# Patient Record
Sex: MALE | Race: BLACK OR AFRICAN AMERICAN | NOT HISPANIC OR LATINO | Employment: OTHER | URBAN - METROPOLITAN AREA
[De-identification: names, ages, dates, MRNs, and addresses within clinical notes are randomized per-mention and may not be internally consistent; named-entity substitution may affect disease eponyms.]

---

## 2017-05-30 ENCOUNTER — OFFICE VISIT (OUTPATIENT)
Dept: FAMILY MEDICINE | Facility: CLINIC | Age: 67
End: 2017-05-30
Payer: MEDICARE

## 2017-05-30 ENCOUNTER — HOSPITAL ENCOUNTER (OUTPATIENT)
Dept: RADIOLOGY | Facility: HOSPITAL | Age: 67
Discharge: HOME | End: 2017-05-30
Attending: INTERNAL MEDICINE
Payer: MEDICARE

## 2017-05-30 ENCOUNTER — TELEPHONE (OUTPATIENT)
Dept: FAMILY MEDICINE | Facility: CLINIC | Age: 67
End: 2017-05-30

## 2017-05-30 VITALS
HEART RATE: 79 BPM | DIASTOLIC BLOOD PRESSURE: 90 MMHG | HEIGHT: 69 IN | OXYGEN SATURATION: 97 % | WEIGHT: 201.25 LBS | SYSTOLIC BLOOD PRESSURE: 154 MMHG | RESPIRATION RATE: 18 BRPM | TEMPERATURE: 98 F | BODY MASS INDEX: 29.81 KG/M2

## 2017-05-30 DIAGNOSIS — G89.29 CHRONIC LOW BACK PAIN WITHOUT SCIATICA, UNSPECIFIED BACK PAIN LATERALITY: ICD-10-CM

## 2017-05-30 DIAGNOSIS — Z12.5 ENCOUNTER FOR PROSTATE CANCER SCREENING: ICD-10-CM

## 2017-05-30 DIAGNOSIS — R29.898 WEAKNESS OF BOTH LOWER EXTREMITIES: ICD-10-CM

## 2017-05-30 DIAGNOSIS — E55.9 VITAMIN D DEFICIENCY: ICD-10-CM

## 2017-05-30 DIAGNOSIS — N40.0 BENIGN NON-NODULAR PROSTATIC HYPERPLASIA WITHOUT LOWER URINARY TRACT SYMPTOMS: ICD-10-CM

## 2017-05-30 DIAGNOSIS — E78.5 HYPERLIPIDEMIA, UNSPECIFIED HYPERLIPIDEMIA TYPE: ICD-10-CM

## 2017-05-30 DIAGNOSIS — H91.93 BILATERAL HEARING LOSS, UNSPECIFIED HEARING LOSS TYPE: ICD-10-CM

## 2017-05-30 DIAGNOSIS — M54.2 NECK PAIN: ICD-10-CM

## 2017-05-30 DIAGNOSIS — I10 ESSENTIAL HYPERTENSION: ICD-10-CM

## 2017-05-30 DIAGNOSIS — I10 ESSENTIAL HYPERTENSION: Primary | ICD-10-CM

## 2017-05-30 DIAGNOSIS — J18.9 PNEUMONIA DUE TO INFECTIOUS ORGANISM, UNSPECIFIED LATERALITY, UNSPECIFIED PART OF LUNG: Primary | ICD-10-CM

## 2017-05-30 DIAGNOSIS — M54.50 CHRONIC LOW BACK PAIN WITHOUT SCIATICA, UNSPECIFIED BACK PAIN LATERALITY: ICD-10-CM

## 2017-05-30 PROCEDURE — 71020 XR CHEST PA AND LATERAL: CPT | Mod: TC,PO

## 2017-05-30 PROCEDURE — 71020 XR CHEST PA AND LATERAL: CPT | Mod: 26,,, | Performed by: RADIOLOGY

## 2017-05-30 PROCEDURE — 99215 OFFICE O/P EST HI 40 MIN: CPT | Mod: S$GLB,,, | Performed by: INTERNAL MEDICINE

## 2017-05-30 PROCEDURE — 99499 UNLISTED E&M SERVICE: CPT | Mod: S$GLB,,, | Performed by: INTERNAL MEDICINE

## 2017-05-30 PROCEDURE — 99999 PR PBB SHADOW E&M-EST. PATIENT-LVL V: CPT | Mod: PBBFAC,,, | Performed by: INTERNAL MEDICINE

## 2017-05-30 RX ORDER — TAMSULOSIN HYDROCHLORIDE 0.4 MG/1
1 CAPSULE ORAL DAILY
Qty: 30 CAPSULE | Refills: 5 | Status: SHIPPED | OUTPATIENT
Start: 2017-05-30 | End: 2018-01-11 | Stop reason: SDUPTHER

## 2017-05-30 RX ORDER — AMLODIPINE AND BENAZEPRIL HYDROCHLORIDE 5; 10 MG/1; MG/1
1 CAPSULE ORAL DAILY
Qty: 30 CAPSULE | Refills: 3 | Status: SHIPPED | OUTPATIENT
Start: 2017-05-30 | End: 2017-10-05 | Stop reason: SDUPTHER

## 2017-05-30 RX ORDER — LEVOFLOXACIN 750 MG/1
750 TABLET ORAL DAILY
Qty: 10 TABLET | Refills: 0 | Status: SHIPPED | OUTPATIENT
Start: 2017-05-30 | End: 2017-05-31

## 2017-05-30 NOTE — PROGRESS NOTES
Subjective:       Patient ID: Jesse Chow Sr. is a 66 y.o. male.    Chief Complaint: Extremity Weakness; Neck Pain; Hypertension; Hyperlipidemia; and Benign Prostatic Hypertrophy  -3-4 months of leg weakness---bilat--feel heavy with walking---------and neck pain and head feels heavy with movementExtremity Weakness    This is a recurrent problem. Pertinent negatives include no fever or numbness.   Neck Pain    This is a recurrent problem. Associated symptoms include weakness. Pertinent negatives include no chest pain, fever, headaches, numbness, photophobia or trouble swallowing.   Hypertension   Associated symptoms include neck pain. Pertinent negatives include no chest pain, headaches, palpitations or shortness of breath. Compliance problems: --------has not taken his lotrel for about 1 year----------------------------------    Hyperlipidemia   Pertinent negatives include no chest pain, myalgias or shortness of breath. Current antihyperlipidemic treatment includes diet change and exercise.   Benign Prostatic Hypertrophy   Irritative symptoms do not include frequency or urgency. Pertinent negatives include no chills, dysuria, hematuria, nausea or vomiting. Past treatments include tamsulosin.     Review of Systems   Constitutional: Positive for activity change and fatigue. Negative for appetite change, chills, diaphoresis, fever and unexpected weight change.   HENT: Negative for drooling, ear discharge, ear pain, facial swelling, hearing loss, mouth sores, nosebleeds, postnasal drip, rhinorrhea, sinus pressure, sneezing, sore throat, tinnitus, trouble swallowing and voice change.    Eyes: Negative for photophobia, redness and visual disturbance.   Respiratory: Negative for apnea, cough, choking, chest tightness, shortness of breath and wheezing.    Cardiovascular: Negative for chest pain, palpitations and leg swelling.   Gastrointestinal: Negative for abdominal distention, abdominal pain, anal bleeding, blood in  stool, constipation, diarrhea, nausea and vomiting.   Endocrine: Negative for cold intolerance, heat intolerance, polydipsia, polyphagia and polyuria.   Genitourinary: Negative for difficulty urinating, dysuria, enuresis, flank pain, frequency, genital sores, hematuria and urgency.   Musculoskeletal: Positive for extremity weakness and neck pain. Negative for arthralgias, back pain, joint swelling, myalgias and neck stiffness.   Skin: Negative for color change, pallor, rash and wound.   Allergic/Immunologic: Negative for food allergies and immunocompromised state.   Neurological: Positive for weakness. Negative for dizziness, tremors, seizures, syncope, facial asymmetry, speech difficulty, light-headedness, numbness and headaches.   Hematological: Negative for adenopathy. Does not bruise/bleed easily.   Psychiatric/Behavioral: Negative for agitation, behavioral problems, confusion, decreased concentration, dysphoric mood, hallucinations, self-injury, sleep disturbance and suicidal ideas. The patient is not nervous/anxious and is not hyperactive.        Objective:      Physical Exam   Constitutional: He is oriented to person, place, and time. He appears well-developed and well-nourished. No distress.   HENT:   Head: Normocephalic and atraumatic.   Eyes: Pupils are equal, round, and reactive to light.   Neck: Normal range of motion. Neck supple. No JVD present. Carotid bruit is not present. No tracheal deviation present. No thyromegaly present.   Cardiovascular: Normal rate, regular rhythm, normal heart sounds and intact distal pulses.    Pulmonary/Chest: Effort normal and breath sounds normal. No respiratory distress. He has no wheezes. He has no rales. He exhibits no tenderness.   Abdominal: Soft. Bowel sounds are normal. He exhibits no distension. There is no tenderness. There is no rebound and no guarding.   Musculoskeletal: Normal range of motion. He exhibits no edema or tenderness.   Lymphadenopathy:     He has  no cervical adenopathy.   Neurological: He is alert and oriented to person, place, and time.   Skin: Skin is warm and dry. No rash noted. He is not diaphoretic. No erythema. No pallor.   Psychiatric: He has a normal mood and affect. His behavior is normal. Judgment and thought content normal.       Assessment:       1. Essential hypertension    2. Hyperlipidemia, unspecified hyperlipidemia type    3. Chronic low back pain without sciatica, unspecified back pain laterality    4. Benign non-nodular prostatic hyperplasia without lower urinary tract symptoms    5. Bilateral hearing loss, unspecified hearing loss type    6. Neck pain    7. Weakness of both lower extremities    8. Encounter for prostate cancer screening    9. Vitamin D deficiency        Plan:         resume lotrel 5/10 mg a day for bp, flomax q day for bph,    Watch diet,exercise.              Notes/labs reviewed.             Check cbc,cmp,esr,lipids,vitd,tsh,psa,cxr.           --------neuro consult for leg weakness-----------         F/u 2 weeks.-colon.

## 2017-05-30 NOTE — TELEPHONE ENCOUNTER
cxr shows ? Pneumonia------take levaquin 750 mg a day x 10 days and do a f/u cxr in 2 weeks-to ensure clearing----------

## 2017-05-31 ENCOUNTER — OFFICE VISIT (OUTPATIENT)
Dept: NEUROLOGY | Facility: CLINIC | Age: 67
End: 2017-05-31
Payer: MEDICARE

## 2017-05-31 VITALS
WEIGHT: 202.38 LBS | BODY MASS INDEX: 29.98 KG/M2 | HEIGHT: 69 IN | DIASTOLIC BLOOD PRESSURE: 80 MMHG | HEART RATE: 72 BPM | SYSTOLIC BLOOD PRESSURE: 144 MMHG

## 2017-05-31 DIAGNOSIS — R29.818 NEUROGENIC CLAUDICATION: ICD-10-CM

## 2017-05-31 DIAGNOSIS — R29.898 WEAKNESS OF BOTH LOWER EXTREMITIES: Primary | ICD-10-CM

## 2017-05-31 PROCEDURE — 99205 OFFICE O/P NEW HI 60 MIN: CPT | Mod: S$GLB,,, | Performed by: PSYCHIATRY & NEUROLOGY

## 2017-05-31 PROCEDURE — 99999 PR PBB SHADOW E&M-EST. PATIENT-LVL III: CPT | Mod: PBBFAC,,, | Performed by: PSYCHIATRY & NEUROLOGY

## 2017-05-31 NOTE — LETTER
May 31, 2017      Sunday Zhu MD  [address] [place] [address]           [facility]  [address] [place] [address]  Phone: [phone]          Patient: Jesse Chow Sr.   MR Number: [id number]   YOB: 1950   Date of Visit: 5/31/2017       Dear Dr. Sunday Zhu:    Thank you for referring Jesse Chow to me for evaluation. Attached you will find relevant portions of my assessment and plan of care.    If you have questions, please do not hesitate to call me. I look forward to following Jesse Chow along with you.    Sincerely,    Rodo Ramirez MD    Enclosure  CC:  No Recipients    If you would like to receive this communication electronically, please contact [email or web address] or [phone] to request more information on [facility] [employer] access.    For providers and/or their staff who would like to refer a patient to [facility], please contact us through our one-stop-shop provider referral line, [facility], at [phone].    If you feel you have received this communication in error or would no longer like to receive these types of communications, please e-mail [email or web address]

## 2017-05-31 NOTE — PROGRESS NOTES
Consult Requested By: No att. providers found  Reason for Consult:   Leg weakness     SUBJECTIVE:       HPI:   This is a 66-year-old gentleman, presented today in the clinic for evaluation of   leg pains.  He is right-handed.  He said that he had a back injury in 1983 or   1984 from falling from the stairs.  He was told that he had some injury in the   back, but did not have any operations.  For the last two to three months, he has   noticed that his legs are heavy.  On walking, it gets heavier.  He did not   fall, but he could not walk longer.  No numbness, tingling except at night.  In   the morning, sometimes he feels that his left hand is numb.  He does not do any   drugs.  Occasional alcohol use, but he smokes.  He said that he might have   stroke six years ago, which affected his memory.  He bowel function is the same,   no change.  Bladder incontinence, frequency and he has been diagnosed with   prostatic hypertrophy.    Past Medical History:   Diagnosis Date    Borderline high blood pressure     Chronic low back pain     H/O: Bell's palsy     H/O: Bell's palsy     Hyperlipidemia     Hypertension     Major depression     Sciatica     Tobacco abuse     Trouble in sleeping      Past Surgical History:   Procedure Laterality Date    HEMORRHOID SURGERY       Family History   Problem Relation Age of Onset    Hypertension Mother     Heart disease Mother     Stroke Mother     Diabetes Sister     Diabetes Brother     Diabetes Brother     Heart disease Brother      Social History   Substance Use Topics    Smoking status: Current Every Day Smoker     Packs/day: 1.00     Years: 42.00     Types: Cigarettes    Smokeless tobacco: Not on file      Comment: half a pack in 3 days      Alcohol use 0.6 oz/week     1 Glasses of wine per week      Comment: 1 glass per week     Review of patient's allergies indicates:   Allergen Reactions    Prostate [animal organ concentrates]        Review of Systems    Constitutional: Negative for fever and weight loss.   HENT: Positive for hearing loss. Negative for ear pain and tinnitus.    Eyes: Negative for blurred vision, double vision, photophobia, pain, discharge and redness.   Respiratory: Negative for cough and shortness of breath.    Cardiovascular: Negative for chest pain, palpitations, claudication and leg swelling.   Gastrointestinal: Negative for abdominal pain, heartburn, nausea and vomiting.   Genitourinary: Negative for dysuria, flank pain, frequency and urgency.   Musculoskeletal: Negative for back pain, falls, joint pain, myalgias and neck pain.   Skin: Negative for itching and rash.   Neurological: Positive for tingling and focal weakness. Negative for dizziness, tremors, sensory change, speech change, seizures, loss of consciousness, weakness and headaches.   Endo/Heme/Allergies: Does not bruise/bleed easily.   Psychiatric/Behavioral: Negative for depression, hallucinations, memory loss and suicidal ideas. The patient is not nervous/anxious and does not have insomnia.        OBJECTIVE:     Vital Signs (Most Recent)  Pulse: 72 (05/31/17 1409)  BP: (!) 144/80 (05/31/17 1409)    Physical Exam   Constitutional: He is oriented to person, place, and time. He appears well-developed and well-nourished. No distress.   HENT:   Head: Normocephalic.   Right Ear: External ear normal.   Left Ear: External ear normal.   Mouth/Throat: Oropharynx is clear and moist.   Eyes: Conjunctivae and EOM are normal. Pupils are equal, round, and reactive to light.   Neck: Normal range of motion. Neck supple. No tracheal deviation present. No thyromegaly present.   Cardiovascular: Regular rhythm, normal heart sounds and intact distal pulses.    Pulmonary/Chest: Effort normal and breath sounds normal. No respiratory distress.   Abdominal: Soft. Bowel sounds are normal. There is no tenderness.   Musculoskeletal: He exhibits no edema or tenderness.   Lymphadenopathy:     He has no cervical  adenopathy.   Neurological: He is alert and oriented to person, place, and time. He displays no tremor and normal reflexes. A sensory deficit is present. No cranial nerve deficit. He exhibits normal muscle tone. Coordination and gait abnormal. He displays no Babinski's sign on the right side. He displays no Babinski's sign on the left side.   Reflex Scores:       Tricep reflexes are 3+ on the right side and 3+ on the left side.       Bicep reflexes are 3+ on the right side and 3+ on the left side.       Brachioradialis reflexes are 3+ on the right side and 3+ on the left side.       Patellar reflexes are 3+ on the right side and 3+ on the left side.       Achilles reflexes are 4+ on the right side and 4+ on the left side.  Decreased sensation in the right leg compared to the left.    Skin: Skin is warm and dry. No rash noted. He is not diaphoretic. No erythema. No pallor.   Psychiatric: He has a normal mood and affect. His behavior is normal. Judgment and thought content normal.         Strength  Deltoids Triceps Biceps Wrist Extension Wrist Flexion Hand    Upper: R 5/5 5/5 5/5 5/5 5/5 5/5    L 5/5 5/5 5/5 5/5 5/5 5/5     Iliopsoas Quadriceps Knee  Flexion Tibialis  anterior Gastro- cnemius EHL   Lower: R 4/5 5/5 5/5 5/5 5/5 5/5    L 4/5 5/5 5/5 5/5 5/5 5/5     Laboratory:  Lab Results   Component Value Date    WBC 9.96 05/30/2017    HGB 13.2 (L) 05/30/2017    HCT 40.9 05/30/2017     05/30/2017    CHOL 196 05/30/2017    TRIG 83 05/30/2017    HDL 35 (L) 05/30/2017    ALT 14 05/30/2017    AST 17 05/30/2017     05/30/2017    K 4.6 05/30/2017     05/30/2017    CREATININE 0.8 05/30/2017    BUN 8 05/30/2017    CO2 27 05/30/2017    TSH 1.347 05/30/2017    PSA 2.2 05/30/2017             ASSESSMENT/PLAN:     Primary Diagnoses:  1. Lower extremity weakness over 2-3 months rule out lumbar radiculopathy or neurogenic claudication and cervical or lumbar levels.     Patient Active Problem List   Diagnosis     Hyperlipidemia    Chronic low back pain    Sciatica    Major depression    Tobacco abuse    Essential hypertension    Decreased vision    BPH (benign prostatic hyperplasia)    Hearing impaired    Vitamin D deficiency    Neck pain    Weakness of both lower extremities        Plan:  MRI of the l-spine and c-spine.  Time spent: 50 minutes in face to face discussion concerning diagnosis, prognosis, review of lab and test results, benefits of treatment as well as management of disease, counseling of patient and coordination of care between various health care providers . Greater than half the time spent was used for coordination of care and counseling of patient.

## 2017-06-02 RX ORDER — LEVOFLOXACIN 750 MG/1
750 TABLET ORAL DAILY
Qty: 10 TABLET | Refills: 0 | Status: SHIPPED | OUTPATIENT
Start: 2017-06-02 | End: 2017-06-13

## 2017-06-02 RX ORDER — LEVOFLOXACIN 750 MG/1
750 TABLET ORAL DAILY
Qty: 10 TABLET | Refills: 0 | Status: SHIPPED | OUTPATIENT
Start: 2017-06-02 | End: 2017-06-02 | Stop reason: SDUPTHER

## 2017-06-02 NOTE — TELEPHONE ENCOUNTER
----- Message from Francisco Javier Wooten sent at 6/2/2017 11:20 AM CDT -----  Contact: Elsie Chow (Spouse)  Elsie Chow (Spouse) is requesting a call from nurse to f/u on pt medication for his pneumonia. Elsie Chow (Spouse) states she went to the pharmacy and the medication wasn't there.        Please call Elsie Chow (Spouse) back at [phone]

## 2017-06-02 NOTE — TELEPHONE ENCOUNTER
Have attempted to contact pt with every number available, multiple attempts, with no success. [phone], someone answered and hung up and never answered again.

## 2017-06-02 NOTE — TELEPHONE ENCOUNTER
----- Message from Ana Sanchez sent at 6/2/2017 10:34 AM CDT -----  Contact: pt  States he's returning a call. Please call pt at [phone]. Thank you

## 2017-06-05 ENCOUNTER — TELEPHONE (OUTPATIENT)
Dept: RADIOLOGY | Facility: HOSPITAL | Age: 67
End: 2017-06-05

## 2017-06-05 NOTE — PROGRESS NOTES
Subjective:       Patient ID: Jesse Chow Sr. is a 66 y.o. male.    Chief Complaint: No chief complaint on file.    HPI  Review of Systems    Objective:      Physical Exam    Assessment:       No diagnosis found.    Plan:

## 2017-06-06 ENCOUNTER — HOSPITAL ENCOUNTER (OUTPATIENT)
Dept: RADIOLOGY | Facility: HOSPITAL | Age: 67
Discharge: HOME | End: 2017-06-06
Attending: PSYCHIATRY & NEUROLOGY
Payer: MEDICARE

## 2017-06-06 DIAGNOSIS — R29.818 NEUROGENIC CLAUDICATION: ICD-10-CM

## 2017-06-06 DIAGNOSIS — R29.898 WEAKNESS OF BOTH LOWER EXTREMITIES: ICD-10-CM

## 2017-06-06 PROCEDURE — 72141 MRI NECK SPINE W/O DYE: CPT | Mod: TC,PO

## 2017-06-06 PROCEDURE — 72141 MRI NECK SPINE W/O DYE: CPT | Mod: 26,,, | Performed by: RADIOLOGY

## 2017-06-06 PROCEDURE — 72148 MRI LUMBAR SPINE W/O DYE: CPT | Mod: TC,PO

## 2017-06-06 PROCEDURE — 72148 MRI LUMBAR SPINE W/O DYE: CPT | Mod: 26,,, | Performed by: RADIOLOGY

## 2017-06-08 ENCOUNTER — TELEPHONE (OUTPATIENT)
Dept: NEUROLOGY | Facility: CLINIC | Age: 67
End: 2017-06-08

## 2017-06-08 NOTE — TELEPHONE ENCOUNTER
----- Message from Rodo Ramirez MD sent at 6/7/2017  7:51 AM CDT -----  Your test result is abnormal but not serious. Will discuss on your return visit.

## 2017-06-09 ENCOUNTER — TELEPHONE (OUTPATIENT)
Dept: NEUROLOGY | Facility: CLINIC | Age: 67
End: 2017-06-09

## 2017-06-13 ENCOUNTER — TELEPHONE (OUTPATIENT)
Dept: FAMILY MEDICINE | Facility: CLINIC | Age: 67
End: 2017-06-13

## 2017-06-13 ENCOUNTER — OFFICE VISIT (OUTPATIENT)
Dept: FAMILY MEDICINE | Facility: CLINIC | Age: 67
End: 2017-06-13
Payer: MEDICARE

## 2017-06-13 ENCOUNTER — HOSPITAL ENCOUNTER (OUTPATIENT)
Dept: RADIOLOGY | Facility: HOSPITAL | Age: 67
Discharge: HOME | End: 2017-06-13
Attending: INTERNAL MEDICINE
Payer: MEDICARE

## 2017-06-13 VITALS
RESPIRATION RATE: 18 BRPM | BODY MASS INDEX: 29.59 KG/M2 | HEART RATE: 82 BPM | SYSTOLIC BLOOD PRESSURE: 120 MMHG | OXYGEN SATURATION: 99 % | HEIGHT: 69 IN | DIASTOLIC BLOOD PRESSURE: 70 MMHG | WEIGHT: 199.75 LBS | TEMPERATURE: 97 F

## 2017-06-13 DIAGNOSIS — M51.36 DDD (DEGENERATIVE DISC DISEASE), LUMBAR: ICD-10-CM

## 2017-06-13 DIAGNOSIS — R93.89 ABNORMAL CXR: ICD-10-CM

## 2017-06-13 DIAGNOSIS — M50.30 DDD (DEGENERATIVE DISC DISEASE), CERVICAL: ICD-10-CM

## 2017-06-13 DIAGNOSIS — G89.29 CHRONIC LOW BACK PAIN WITHOUT SCIATICA, UNSPECIFIED BACK PAIN LATERALITY: ICD-10-CM

## 2017-06-13 DIAGNOSIS — E78.5 HYPERLIPIDEMIA, UNSPECIFIED HYPERLIPIDEMIA TYPE: ICD-10-CM

## 2017-06-13 DIAGNOSIS — E53.8 B12 DEFICIENCY: ICD-10-CM

## 2017-06-13 DIAGNOSIS — M79.605 PAIN IN BOTH LOWER EXTREMITIES: ICD-10-CM

## 2017-06-13 DIAGNOSIS — M54.50 CHRONIC LOW BACK PAIN WITHOUT SCIATICA, UNSPECIFIED BACK PAIN LATERALITY: ICD-10-CM

## 2017-06-13 DIAGNOSIS — E55.9 VITAMIN D DEFICIENCY: ICD-10-CM

## 2017-06-13 DIAGNOSIS — I10 ESSENTIAL HYPERTENSION: ICD-10-CM

## 2017-06-13 DIAGNOSIS — N40.0 BENIGN NON-NODULAR PROSTATIC HYPERPLASIA WITHOUT LOWER URINARY TRACT SYMPTOMS: ICD-10-CM

## 2017-06-13 DIAGNOSIS — R29.818 NEUROGENIC CLAUDICATION: ICD-10-CM

## 2017-06-13 DIAGNOSIS — R29.898 WEAKNESS OF BOTH LOWER EXTREMITIES: Primary | ICD-10-CM

## 2017-06-13 DIAGNOSIS — M79.604 PAIN IN BOTH LOWER EXTREMITIES: ICD-10-CM

## 2017-06-13 DIAGNOSIS — J18.9 PNEUMONIA DUE TO INFECTIOUS ORGANISM, UNSPECIFIED LATERALITY, UNSPECIFIED PART OF LUNG: ICD-10-CM

## 2017-06-13 DIAGNOSIS — R91.1 LUNG NODULE: Primary | ICD-10-CM

## 2017-06-13 PROCEDURE — 99499 UNLISTED E&M SERVICE: CPT | Mod: S$GLB,,, | Performed by: INTERNAL MEDICINE

## 2017-06-13 PROCEDURE — 71020 XR CHEST PA AND LATERAL: CPT | Mod: 26,,, | Performed by: RADIOLOGY

## 2017-06-13 PROCEDURE — 99215 OFFICE O/P EST HI 40 MIN: CPT | Mod: S$GLB,,, | Performed by: INTERNAL MEDICINE

## 2017-06-13 PROCEDURE — 99999 PR PBB SHADOW E&M-EST. PATIENT-LVL V: CPT | Mod: PBBFAC,,, | Performed by: INTERNAL MEDICINE

## 2017-06-13 PROCEDURE — 71020 XR CHEST PA AND LATERAL: CPT | Mod: TC,PO

## 2017-06-13 NOTE — PROGRESS NOTES
Subjective:       Patient ID: Jesse Chow Sr. is a 66 y.o. male.    Chief Complaint: Follow-up; Results; Hypertension; Hyperlipidemia; Benign Prostatic Hypertrophy; Leg Pain; and Back Pain    Hypertension   Pertinent negatives include no chest pain, headaches, neck pain, palpitations or shortness of breath. Past treatments include calcium channel blockers and ACE inhibitors. The current treatment provides significant improvement.   Hyperlipidemia   Associated symptoms include leg pain. Pertinent negatives include no chest pain, myalgias or shortness of breath.   Benign Prostatic Hypertrophy   Irritative symptoms do not include frequency or urgency. Pertinent negatives include no chills, dysuria, hematuria, nausea or vomiting. Past treatments include tamsulosin.   Leg Pain    Pertinent negatives include no numbness.   Back Pain   This is a chronic problem. Associated symptoms include leg pain. Pertinent negatives include no abdominal pain, chest pain, dysuria, fever, headaches, numbness or weakness.     Review of Systems   Constitutional: Negative for activity change, appetite change, chills, diaphoresis, fatigue, fever and unexpected weight change.   HENT: Negative for drooling, ear discharge, ear pain, facial swelling, hearing loss, mouth sores, nosebleeds, postnasal drip, rhinorrhea, sinus pressure, sneezing, sore throat, tinnitus, trouble swallowing and voice change.    Eyes: Negative for photophobia, redness and visual disturbance.   Respiratory: Positive for cough. Negative for apnea, choking, chest tightness, shortness of breath and wheezing.    Cardiovascular: Negative for chest pain, palpitations and leg swelling.   Gastrointestinal: Negative for abdominal distention, abdominal pain, anal bleeding, blood in stool, constipation, diarrhea, nausea, rectal pain and vomiting.   Endocrine: Negative for cold intolerance, heat intolerance, polydipsia, polyphagia and polyuria.   Genitourinary: Negative for  difficulty urinating, dysuria, enuresis, flank pain, frequency, genital sores, hematuria and urgency.   Musculoskeletal: Positive for back pain and gait problem. Negative for arthralgias, joint swelling, myalgias, neck pain and neck stiffness.   Skin: Negative for color change, pallor, rash and wound.   Allergic/Immunologic: Negative for food allergies and immunocompromised state.   Neurological: Negative for dizziness, tremors, seizures, syncope, facial asymmetry, speech difficulty, weakness, light-headedness, numbness and headaches.   Hematological: Negative for adenopathy. Does not bruise/bleed easily.   Psychiatric/Behavioral: Negative for agitation, behavioral problems, confusion, decreased concentration, dysphoric mood, hallucinations, self-injury, sleep disturbance and suicidal ideas. The patient is not nervous/anxious and is not hyperactive.        Objective:      Physical Exam   Constitutional: He is oriented to person, place, and time. He appears well-developed and well-nourished. No distress.   HENT:   Head: Normocephalic and atraumatic.   Eyes: Pupils are equal, round, and reactive to light.   Neck: Normal range of motion. Neck supple. No JVD present. Carotid bruit is not present. No tracheal deviation present. No thyromegaly present.   Cardiovascular: Normal rate, regular rhythm, normal heart sounds and intact distal pulses.    Pulmonary/Chest: Effort normal and breath sounds normal. No respiratory distress. He has no wheezes. He has no rales. He exhibits no tenderness.   Abdominal: Soft. Bowel sounds are normal. He exhibits no distension. There is no tenderness. There is no rebound and no guarding.   Musculoskeletal: Normal range of motion. He exhibits no edema or tenderness.   Lymphadenopathy:     He has no cervical adenopathy.   Neurological: He is alert and oriented to person, place, and time.   Skin: Skin is warm and dry. No rash noted. He is not diaphoretic. No erythema. No pallor.   Psychiatric:  He has a normal mood and affect. His behavior is normal. Judgment and thought content normal.   Nursing note and vitals reviewed.      Assessment:       1. Weakness of both lower extremities    2. Hyperlipidemia, unspecified hyperlipidemia type    3. Essential hypertension    4. Chronic low back pain without sciatica, unspecified back pain laterality    5. Benign non-nodular prostatic hyperplasia without lower urinary tract symptoms    6. Vitamin D deficiency    7. Neurogenic claudication    8. Pain in both lower extremities    9. Abnormal CXR    10. B12 deficiency    11. DDD (degenerative disc disease), lumbar    12. DDD (degenerative disc disease), cervical        Plan:         continue meds.                   Notes/labs reviewed.                  Check bmp,cbc,b12,cxr,.marva's                         F/u neuro.                  ---------physiatry consult for neurogenic claudication------                       F/u 1 month/.

## 2017-06-26 ENCOUNTER — PATIENT OUTREACH (OUTPATIENT)
Dept: ADMINISTRATIVE | Facility: HOSPITAL | Age: 67
End: 2017-06-26

## 2017-06-26 NOTE — LETTER
June 26, 2017    Jesse Chow Sr.  [address] [facility] [address]             [facility]  [address]  [facility] [address]  Phone: [phone] Dear Mr. Chow:      [facility] is committed to your overall health.  To help you get the most out of each of your visits, we will review your information to make sure you are up to date on all of your recommended tests and/or procedures.      Sunday Zhu MD has found that you may be due for   Health Maintenance Due   Topic    TETANUS VACCINE     Colonoscopy     Zoster Vaccine     Pneumococcal Vaccine     Abdominal Aortic Aneurysm Screening       If you have had any of the above done at another facility, please bring the records or information with you so that your record at [facility] will be complete.    If you are currently taking medication, please bring it with you to your appointment for review.    We will be happy to assist you with scheduling any necessary appointments or you may contact the [facility] appointment desk at [phone] to schedule at your convenience.     Thank you for choosing [facility] for your healthcare needs,      Sheela PITT LPN Care Coordinator  Care Coordination Department  [facility]  [phone]

## 2017-07-05 ENCOUNTER — TELEPHONE (OUTPATIENT)
Dept: CARDIOLOGY | Facility: CLINIC | Age: 67
End: 2017-07-05

## 2017-07-11 ENCOUNTER — TELEPHONE (OUTPATIENT)
Dept: CARDIOLOGY | Facility: CLINIC | Age: 67
End: 2017-07-11

## 2017-07-11 ENCOUNTER — OFFICE VISIT (OUTPATIENT)
Dept: FAMILY MEDICINE | Facility: CLINIC | Age: 67
End: 2017-07-11
Payer: MEDICARE

## 2017-07-11 VITALS
OXYGEN SATURATION: 98 % | RESPIRATION RATE: 18 BRPM | BODY MASS INDEX: 29.29 KG/M2 | DIASTOLIC BLOOD PRESSURE: 74 MMHG | SYSTOLIC BLOOD PRESSURE: 126 MMHG | WEIGHT: 197.75 LBS | HEART RATE: 86 BPM | TEMPERATURE: 98 F | HEIGHT: 69 IN

## 2017-07-11 DIAGNOSIS — M79.605 PAIN IN BOTH LOWER EXTREMITIES: ICD-10-CM

## 2017-07-11 DIAGNOSIS — E55.9 VITAMIN D DEFICIENCY: ICD-10-CM

## 2017-07-11 DIAGNOSIS — H54.7 DECREASED VISION: ICD-10-CM

## 2017-07-11 DIAGNOSIS — R29.898 WEAKNESS OF BOTH LOWER EXTREMITIES: Primary | ICD-10-CM

## 2017-07-11 DIAGNOSIS — M79.604 PAIN IN BOTH LOWER EXTREMITIES: ICD-10-CM

## 2017-07-11 DIAGNOSIS — G89.29 CHRONIC LOW BACK PAIN WITHOUT SCIATICA, UNSPECIFIED BACK PAIN LATERALITY: ICD-10-CM

## 2017-07-11 DIAGNOSIS — E78.5 HYPERLIPIDEMIA, UNSPECIFIED HYPERLIPIDEMIA TYPE: ICD-10-CM

## 2017-07-11 DIAGNOSIS — N40.0 BENIGN PROSTATIC HYPERPLASIA WITHOUT LOWER URINARY TRACT SYMPTOMS: ICD-10-CM

## 2017-07-11 DIAGNOSIS — R29.818 NEUROGENIC CLAUDICATION: ICD-10-CM

## 2017-07-11 DIAGNOSIS — M54.50 CHRONIC LOW BACK PAIN WITHOUT SCIATICA, UNSPECIFIED BACK PAIN LATERALITY: ICD-10-CM

## 2017-07-11 DIAGNOSIS — R91.1 LUNG NODULE: ICD-10-CM

## 2017-07-11 DIAGNOSIS — I10 ESSENTIAL HYPERTENSION: ICD-10-CM

## 2017-07-11 DIAGNOSIS — M54.2 NECK PAIN: ICD-10-CM

## 2017-07-11 PROCEDURE — 99999 PR PBB SHADOW E&M-EST. PATIENT-LVL IV: CPT | Mod: PBBFAC,,, | Performed by: INTERNAL MEDICINE

## 2017-07-11 PROCEDURE — 99214 OFFICE O/P EST MOD 30 MIN: CPT | Mod: S$GLB,,, | Performed by: INTERNAL MEDICINE

## 2017-07-11 PROCEDURE — 99499 UNLISTED E&M SERVICE: CPT | Mod: S$GLB,,, | Performed by: INTERNAL MEDICINE

## 2017-07-11 NOTE — PROGRESS NOTES
Subjective:       Patient ID: Jesse Chow Sr. is a 66 y.o. male.    Chief Complaint: Follow-up; Hypertension; Hyperlipidemia; Benign Prostatic Hypertrophy; and Back Pain  ------------missed several appts-------2nd transportation/cost-------including ct chest.Hypertension   The problem is controlled. Associated symptoms include neck pain. Pertinent negatives include no chest pain, headaches, palpitations or shortness of breath.   Hyperlipidemia   Pertinent negatives include no chest pain, myalgias or shortness of breath. Current antihyperlipidemic treatment includes diet change and exercise.   Benign Prostatic Hypertrophy   Irritative symptoms do not include frequency or urgency. Pertinent negatives include no chills, dysuria, hematuria, nausea or vomiting. Past treatments include tamsulosin.   Back Pain   This is a chronic problem. Pertinent negatives include no abdominal pain, chest pain, dysuria, fever, headaches, numbness or weakness.     Review of Systems   Constitutional: Negative for activity change, appetite change, chills, diaphoresis, fatigue, fever and unexpected weight change.   HENT: Negative for drooling, ear discharge, ear pain, facial swelling, hearing loss, mouth sores, nosebleeds, postnasal drip, rhinorrhea, sinus pressure, sneezing, sore throat, tinnitus, trouble swallowing and voice change.    Eyes: Negative for photophobia, redness and visual disturbance.   Respiratory: Negative for apnea, cough, choking, chest tightness, shortness of breath and wheezing.    Cardiovascular: Negative for chest pain, palpitations and leg swelling.   Gastrointestinal: Negative for abdominal distention, abdominal pain, anal bleeding, blood in stool, constipation, diarrhea, nausea and vomiting.   Endocrine: Negative for cold intolerance, heat intolerance, polydipsia, polyphagia and polyuria.   Genitourinary: Negative for difficulty urinating, dysuria, enuresis, flank pain, frequency, genital sores, hematuria and  urgency.   Musculoskeletal: Positive for back pain and neck pain. Negative for arthralgias, gait problem, joint swelling, myalgias and neck stiffness.   Skin: Negative for color change, pallor, rash and wound.   Allergic/Immunologic: Negative for food allergies and immunocompromised state.   Neurological: Negative for dizziness, tremors, seizures, syncope, facial asymmetry, speech difficulty, weakness, light-headedness, numbness and headaches.   Hematological: Negative for adenopathy. Does not bruise/bleed easily.   Psychiatric/Behavioral: Negative for agitation, behavioral problems, confusion, decreased concentration, dysphoric mood, hallucinations, self-injury, sleep disturbance and suicidal ideas. The patient is not nervous/anxious and is not hyperactive.        Objective:      Physical Exam   Constitutional: He is oriented to person, place, and time. He appears well-developed and well-nourished. No distress.   HENT:   Head: Normocephalic and atraumatic.   Eyes: Pupils are equal, round, and reactive to light.   Neck: Normal range of motion. Neck supple. No JVD present. Carotid bruit is not present. No tracheal deviation present. No thyromegaly present.   Cardiovascular: Normal rate, regular rhythm, normal heart sounds and intact distal pulses.    Pulmonary/Chest: Effort normal and breath sounds normal. No respiratory distress. He has no wheezes. He has no rales. He exhibits no tenderness.   Abdominal: Soft. Bowel sounds are normal. He exhibits no distension. There is no tenderness. There is no rebound and no guarding.   Musculoskeletal: Normal range of motion. He exhibits no edema or tenderness.   Lymphadenopathy:     He has no cervical adenopathy.   Neurological: He is alert and oriented to person, place, and time.   Skin: Skin is warm and dry. No rash noted. He is not diaphoretic. No erythema. No pallor.   Psychiatric: He has a normal mood and affect. His behavior is normal. Judgment and thought content normal.    Nursing note and vitals reviewed.      Assessment:       1. Weakness of both lower extremities    2. Vitamin D deficiency    3. Pain in both lower extremities    4. Neurogenic claudication    5. Neck pain    6. Hyperlipidemia, unspecified hyperlipidemia type    7. Essential hypertension    8. Decreased vision    9. Chronic low back pain without sciatica, unspecified back pain laterality    10. Benign prostatic hyperplasia without lower urinary tract symptoms    11. Lung nodule        Plan:        stable---------continue meds.                Notes/labs reviewed.                  Reschedule ct chest-------                   F/u 1 month-------readress physiatry/colon.

## 2017-07-11 NOTE — TELEPHONE ENCOUNTER
Attempted to contact pt regarding missed appointment.  No voicemail.  Mailed slip with new date and time.

## 2017-07-14 ENCOUNTER — TELEPHONE (OUTPATIENT)
Dept: FAMILY MEDICINE | Facility: CLINIC | Age: 67
End: 2017-07-14

## 2017-07-14 ENCOUNTER — TELEPHONE (OUTPATIENT)
Dept: RADIOLOGY | Facility: HOSPITAL | Age: 67
End: 2017-07-14

## 2017-07-14 DIAGNOSIS — I10 ESSENTIAL HYPERTENSION: Primary | ICD-10-CM

## 2017-07-14 NOTE — TELEPHONE ENCOUNTER
----- Message from Myranda Bowser-Overs sent at 7/14/2017  7:38 AM CDT -----  Contact: Radiology  Hello,    This patient is scheduled for a CT on Monday. We will need a recent creatinine for this patient (within the last 30 days). Please place stat order and schedule patient 1 hr prior to radiology appointment. If you have any questions please contact Radiology at [phone].    Thank you,     Myranda  Radiology Dept

## 2017-07-17 ENCOUNTER — TELEPHONE (OUTPATIENT)
Dept: FAMILY MEDICINE | Facility: CLINIC | Age: 67
End: 2017-07-17

## 2017-07-17 DIAGNOSIS — I10 ESSENTIAL HYPERTENSION: Primary | ICD-10-CM

## 2017-07-17 NOTE — TELEPHONE ENCOUNTER
----- Message from RT Arley sent at 7/17/2017  9:30 AM CDT -----  Regarding: pt needs STAT creatinine and lab appointment booked  Mr. Chow needs STAT creatinine ordered before his CT tomorrow at 9:50pm.  Please book lab appointment and STAT creatinine at least 1 1/2 hours before CT appointment or if patient wants to get them done today. Please make sure they are STAT     Please contact patient to make them aware of the changes. We can not do patient until lab results are received.     Patients 60 years and older must have labs within a 30 day window.         Thanks Cathy Schmitz   Please call with any question [phone]

## 2017-07-18 ENCOUNTER — HOSPITAL ENCOUNTER (OUTPATIENT)
Dept: RADIOLOGY | Facility: HOSPITAL | Age: 67
Discharge: HOME | End: 2017-07-18
Attending: INTERNAL MEDICINE
Payer: MEDICARE

## 2017-07-18 DIAGNOSIS — R91.1 LUNG NODULE: ICD-10-CM

## 2017-07-18 PROCEDURE — 71270 CT THORAX DX C-/C+: CPT | Mod: 26,,, | Performed by: RADIOLOGY

## 2017-07-18 PROCEDURE — 71270 CT THORAX DX C-/C+: CPT | Mod: TC,PO

## 2017-07-18 PROCEDURE — 25500020 PHARM REV CODE 255: Mod: PO | Performed by: INTERNAL MEDICINE

## 2017-07-18 RX ADMIN — IOHEXOL 75 ML: 350 INJECTION, SOLUTION INTRAVENOUS at 11:07

## 2017-07-19 ENCOUNTER — TELEPHONE (OUTPATIENT)
Dept: FAMILY MEDICINE | Facility: CLINIC | Age: 67
End: 2017-07-19

## 2017-07-19 DIAGNOSIS — R93.89 ABNORMAL CT OF THE CHEST: Primary | ICD-10-CM

## 2017-07-31 ENCOUNTER — TELEPHONE (OUTPATIENT)
Dept: FAMILY MEDICINE | Facility: CLINIC | Age: 67
End: 2017-07-31

## 2017-07-31 NOTE — TELEPHONE ENCOUNTER
----- Message from Stephanie Staley sent at 7/31/2017  3:01 PM CDT -----  Contact: evan/wife  Would like to speak to nurse about pt. Please call back at [phone]. thanks

## 2017-08-01 NOTE — TELEPHONE ENCOUNTER
----- Message from Pamela Harris sent at 8/1/2017 10:18 AM CDT -----  Contact: Elsie/wife  Elsie returned call. She can be contacted at [phone] (she is in a meeting; please leave a detailed message).     Thanks,  Pamela

## 2017-08-02 ENCOUNTER — TELEPHONE (OUTPATIENT)
Dept: FAMILY MEDICINE | Facility: CLINIC | Age: 67
End: 2017-08-02

## 2017-08-02 NOTE — TELEPHONE ENCOUNTER
----- Message from Ana Sanchez sent at 8/1/2017  4:11 PM CDT -----  Contact: wife  States she's returning a call regarding his test with the mask for his lungs. Please call Mrs Chow at [phone]. Thank you

## 2017-08-03 ENCOUNTER — TELEPHONE (OUTPATIENT)
Dept: FAMILY MEDICINE | Facility: CLINIC | Age: 67
End: 2017-08-03

## 2017-08-03 NOTE — TELEPHONE ENCOUNTER
Spoke with pt wife, advised her she have a 4pm appt tomorrow and that it's imperative that she take him to see what's going on with him. She advised she will definitely have him there tomorrow and will follow up with us. In addition she states she made need your help speaking to him if surgery is required. She thinks that he is more afraid of the outcome and surgery if required. Advised to just let us know.

## 2017-08-04 ENCOUNTER — OFFICE VISIT (OUTPATIENT)
Dept: PULMONOLOGY | Facility: CLINIC | Age: 67
End: 2017-08-04
Payer: MEDICARE

## 2017-08-04 VITALS
RESPIRATION RATE: 18 BRPM | SYSTOLIC BLOOD PRESSURE: 142 MMHG | OXYGEN SATURATION: 99 % | HEART RATE: 79 BPM | BODY MASS INDEX: 27.32 KG/M2 | WEIGHT: 195.13 LBS | DIASTOLIC BLOOD PRESSURE: 68 MMHG | HEIGHT: 71 IN

## 2017-08-04 DIAGNOSIS — R91.1 LUNG NODULE: Primary | ICD-10-CM

## 2017-08-04 DIAGNOSIS — R91.8 LUNG MASS: ICD-10-CM

## 2017-08-04 DIAGNOSIS — Z72.0 TOBACCO ABUSE: ICD-10-CM

## 2017-08-04 PROCEDURE — G0009 ADMIN PNEUMOCOCCAL VACCINE: HCPCS | Mod: S$GLB,,, | Performed by: INTERNAL MEDICINE

## 2017-08-04 PROCEDURE — 99205 OFFICE O/P NEW HI 60 MIN: CPT | Mod: 25,S$GLB,, | Performed by: INTERNAL MEDICINE

## 2017-08-04 PROCEDURE — 3008F BODY MASS INDEX DOCD: CPT | Mod: S$GLB,,, | Performed by: INTERNAL MEDICINE

## 2017-08-04 PROCEDURE — 90732 PPSV23 VACC 2 YRS+ SUBQ/IM: CPT | Mod: S$GLB,,, | Performed by: INTERNAL MEDICINE

## 2017-08-04 PROCEDURE — 99999 PR PBB SHADOW E&M-EST. PATIENT-LVL IV: CPT | Mod: PBBFAC,,, | Performed by: INTERNAL MEDICINE

## 2017-08-04 RX ORDER — LIDOCAINE HYDROCHLORIDE 20 MG/ML
2 JELLY TOPICAL ONCE
Status: CANCELLED | OUTPATIENT
Start: 2017-08-04 | End: 2017-08-04

## 2017-08-04 RX ORDER — LIDOCAINE HYDROCHLORIDE 10 MG/ML
20 INJECTION INFILTRATION; PERINEURAL ONCE
Status: CANCELLED | OUTPATIENT
Start: 2017-08-04 | End: 2017-08-04

## 2017-08-04 RX ORDER — SODIUM CHLORIDE, SODIUM LACTATE, POTASSIUM CHLORIDE, CALCIUM CHLORIDE 600; 310; 30; 20 MG/100ML; MG/100ML; MG/100ML; MG/100ML
INJECTION, SOLUTION INTRAVENOUS CONTINUOUS
Status: CANCELLED | OUTPATIENT
Start: 2017-08-04

## 2017-08-04 NOTE — PROGRESS NOTES
History & Physical    SUBJECTIVE:     History of Present Illness:  Patient is a 66 y.o. male presents with  Referred to me by  Dr Cornell Zhu:  Is accompanied by his spouse  Patient does me he has lost about 40 pounds over the last 6 months  I have reviewed all his imaging December 2014, February 2015, May 2017 and June 2017  Patient has had a chronic cough non resolving treated for recurrent right middle lobe pneumonia  He has had a half pack year smoking history for the last 50 years  Retired  Patient doesn't have any cardiac issues not on any blood thinners  I reviewed his CAT scan which shows a large mass in the right hilum.  This mass is very suspicious for neoplasm   I have explained this to the patient  Endobronchial biopsies will be scheduled for 16 August  Consent will be obtained  Sampling of lymph nodes stations 7 station 4R station for 4L and station 10 R  Will get pneumoconcal Vac  Also discussed smoking cessation    Chief Complaint   Patient presents with    Shortness of Breath     abn ct        Review of patient's allergies indicates:  No Known Allergies    Current Outpatient Prescriptions   Medication Sig Dispense Refill    albuterol (PROAIR HFA) 90 mcg/actuation inhaler INHALE 2 PUFFS INTO THE LUNGS EVERY 6 (SIX) HOURS AS NEEDED FOR WHEEZING. 16 g 3    amlodipine-benazepril 5-10 mg (LOTREL) 5-10 mg per capsule Take 1 capsule by mouth once daily. 30 capsule 3    tamsulosin (FLOMAX) 0.4 mg Cp24 Take 1 capsule (0.4 mg total) by mouth once daily. 30 capsule 5     No current facility-administered medications for this visit.        Past Medical History:   Diagnosis Date    Borderline high blood pressure     Chronic low back pain     DDD (degenerative disc disease), lumbar 6/13/2017    H/O: Bell's palsy     H/O: Bell's palsy     Hyperlipidemia     Hypertension     Major depression     Sciatica     Tobacco abuse     Trouble in sleeping      Past Surgical History:   Procedure Laterality  "Date    HEMORRHOID SURGERY       Family History   Problem Relation Age of Onset    Hypertension Mother     Heart disease Mother     Stroke Mother     Diabetes Sister     Diabetes Brother     Diabetes Brother     Heart disease Brother      Social History   Substance Use Topics    Smoking status: Current Every Day Smoker     Packs/day: 1.00     Years: 42.00     Types: Cigarettes    Smokeless tobacco: Never Used      Comment: half a pack in 3 days      Alcohol use 0.6 oz/week     1 Glasses of wine per week      Comment: 1 glass per week        Review of Systems:  Review of Systems   Constitutional: Positive for unexpected weight change.   HENT: Negative.    Eyes: Negative.    Respiratory: Positive for cough.    Cardiovascular: Negative.    Gastrointestinal: Negative.    Endocrine: Negative.    Genitourinary: Negative.    Musculoskeletal: Positive for back pain.   Skin: Negative.    Allergic/Immunologic: Negative.    Neurological: Negative.    Hematological: Negative.    Psychiatric/Behavioral: Positive for sleep disturbance.       OBJECTIVE:     Vital Signs (Most Recent)  Pulse: 79 (08/04/17 1630)  Resp: 18 (08/04/17 1630)  BP: (!) 142/68 (08/04/17 1630)  SpO2: 99 % (08/04/17 1630)  5' 10.5" (1.791 m)  88.5 kg (195 lb 1.7 oz)     Physical Exam:  Physical Exam   Constitutional: He is oriented to person, place, and time. He appears well-developed and well-nourished.   HENT:   Head: Normocephalic and atraumatic.   Nose: Nose normal.   Mouth/Throat: Oropharynx is clear and moist.   Eyes: EOM are normal. Pupils are equal, round, and reactive to light.   Neck: Normal range of motion. Neck supple. No JVD present. No tracheal deviation present.   Cardiovascular: Normal rate, regular rhythm, normal heart sounds and intact distal pulses.    No murmur heard.  Pulmonary/Chest: Effort normal and breath sounds normal. No respiratory distress. He has no wheezes. He has no rales. He exhibits no tenderness.   Abdominal: " Soft. Bowel sounds are normal.   Musculoskeletal: Normal range of motion.   Lymphadenopathy:     He has no cervical adenopathy.   Neurological: He is alert and oriented to person, place, and time. He has normal reflexes.   Skin: Skin is warm and dry.   Psychiatric: He has a normal mood and affect.   Nursing note and vitals reviewed.      Laboratory  CBC: Reviewed  BMP: Reviewed    Diagnostic Results:  X-Ray: Reviewed  CT: Reviewed     1.  RIGHT hilar mass with narrowing of the RIGHT upper lobe bronchus and extension of soft tissue density to the david noted.   See discussion above.    2.  Bilateral subcentimeter scattered pulmonary nodules raising possibility metastatic disease.    3.  Linear infiltrates with reticular nodular type appearance extending to parenchyma adjacent to the mass within the RIGHT lower lobe.  ASSESSMENT/PLAN:     Problem List Items Addressed This Visit     Tobacco abuse     Assistance with smoking cessation was offered, including:  []  Medications  [x]  Counseling  []  Printed Information on Smoking Cessation  []  Referral to a Smoking Cessation Program    Patient was counseled regarding smoking for 3-10 minutes.             Relevant Orders    Ambulatory referral to Smoking Cessation Program    Ambulatory Referral to Cardiology    Lung nodule - Primary    Relevant Orders    Complete PFT without bronchodilator - Clinic    NM PET CT Routine Skull to Mid Thigh    Ambulatory Referral to Cardiology    Case request GI: BRONCHOSCOPY (Completed)    Pneumococcal polysaccharide vaccine 23-valent greater than or equal to 1yo subcutaneous/IM (Completed)    Lung mass    Relevant Orders    Complete PFT without bronchodilator - Clinic    NM PET CT Routine Skull to Mid Thigh    Ambulatory Referral to Cardiology    Case request GI: BRONCHOSCOPY (Completed)    Pneumococcal polysaccharide vaccine 23-valent greater than or equal to 1yo subcutaneous/IM (Completed)      Other Visit Diagnoses    None.          PLAN:Plan      Explained to patient and spouse my concern that this mass represent likely neoplasm but need biopsy to confirm  Consent for EBUS give: Station 4L, 4R, 7, 10R  Cardiology preop clearance    Return in about 2 weeks (around 8/18/2017) for pneumo 23, PFT, PETC T, EBUS consent, referal to Cardiology.    This note was prepared using voice recognition system and is likely to have sound alike errors that may have been overlooked even after proof reading.  Please call me with any questions    Discussed diagnosis, its evaluation, treatment and usual course. All questions answered.    Thank you for the courtesy of participating in the care of this patient: Dr Cornell Peña MD

## 2017-08-04 NOTE — PATIENT INSTRUCTIONS
How to Quit Smoking  Smoking is one of the hardest habits to break. About half of all people who have ever smoked have been able to quit. Most people who still smoke want to quit. Here are some of the best ways to stop smoking.    Keep trying  It takes most smokers about eight tries before they can quit entirely. Its important not to give up.  Go cold turkey  Most former smokers quit cold turkey (all at once). Trying to cut back gradually doesn't seem to work as well, perhaps because it continues the smoking habit. Also, it is possible to inhale more while smoking fewer cigarettes. This results in the same amount of nicotine in your body!  Get support  Support programs can be a big help, especially for heavy smokers. These groups offer lectures, ways to change behavior, and peer support. Here are some ways to find a support program:  · Free national quitline: 800-QUIT-NOW (706-735-8270).  · Hospital quit-smoking programs.  · American Lung Association: (372.671.3876).  · American Cancer Society (060-929-9044).  Support at home is important too. Nonsmokers can offer praise and encouragement. If the smoker in your life finds it hard to quit, encourage them to keep trying!  Over-the-counter medicines  Nicotine replacement therapy may make quitting easier. Certain aids, such as the nicotine patch, gum, and lozenges, are available without a prescription. It is best to use these under a doctors care, though. The skin patch provides a steady supply of nicotine. Nicotine gum and lozenges give temporary bursts of low levels of nicotine. Both methods reduce the craving for cigarettes. Warning: If you have nausea, vomiting, dizziness, weakness, or a fast heartbeat, stop using these products and see your doctor.  Prescription medicines  After reviewing your smoking patterns and prior attempts to quit, your doctor may offer a prescription medicine such as bupropion, varenicline, a nicotine inhaler, or nasal spray. Each has  "advantages and side effects. Your doctor can review these with you.  Health benefits of quitting  The benefits of quitting start right away and keep improving the longer you go without smoking. These benefits occur at any age.  So whether you are 17 or 70, quitting is a good decision. Some of the benefits include:  · 20 minutes: Blood pressure and pulse return to normal.  · 8 hours: Oxygen levels return to normal.  · 2 days: Ability to smell and taste begin to improve as damaged nerves regrow.  · 2 to 3 weeks: Circulation and lung function improve.  · 1 to 9 months: Coughing, congestion, and shortness of breath decrease; tiredness decreases.  · 1 year: Risk of heart attack decreases by half.  · 5 years: Risk of lung cancer decreases by half; risk of stroke becomes the same as a nonsmokers.  For more on how to quit smoking, try these online resources:   · [employer]  · "Clearing the Air" booklet from the National Cancer [place]: [employer].gov/sites/default/files/pdf/clearing-the-air-accessible.pdf  Date Last Reviewed: 4/28/2015 © 2000-2016 [employer]. [address] [place] [address]. All rights reserved. This information is not intended as a substitute for professional medical care. Always follow your healthcare professional's instructions.        Coping with Smoking Withdrawal  For the first few days after you quit smoking, you may feel cranky, restless, depressed, or low on energy. These are symptoms of withdrawal. Your body needs time to recover from smoking. Your symptoms should lessen within a few days.    Coping with the urge to smoke  · Deep-breathe. Inhale through your nose. Count to five. Slowly exhale through your mouth.  · Drink water. Try to drink eight or more 8-ounce glasses of water a day.  · Keep your hands busy. Wash your car. Draw. Do a puzzle. Build a birdhouse.  · Delay. The urge to smoke lasts only 3 to 5 minutes.  Get support  Individual, group, and telephone " counseling can help keep you on track. Ask your healthcare provider for more information about resources available to you.  Control stress  After you quit, you may feel irritable and stressed. Try taking a warm bath or shower. Listen to music. Try yoga or meditate. Call friends or talk with a professional.  Exercise  Exercise helps your body and mind feel better. There are many ways to be more active. Find something you enjoy doing. See if a friend will join you for a walk or a bike ride.  Sleep better  You may feel tired but have trouble falling asleep. Try to relax before bed. Do a few stretching exercises. Read for a while. Also, avoid caffeine for at least a few hours before bedtime.  Get fit, not fat  You may notice an increased appetite. Many people who quit smoking gain a few pounds. To limit weight gain, try to watch what you eat. Cut back on fat in your diet. Snack on low-calorie foods, like fresh fruits and vegetables. Drink low-calorie liquids, especially water. Regular exercise can also help you stay fit. And remember: Your main goal is to be a nonsmoker. Stay focused on that goal.  Quit-smoking products  There are a number of products that can help you quit smoking including medications and nicotine replacement products. They are available over the counter or by prescription. Ask your healthcare provider if any of these could help you quit smoking.        For more information  · smokefree.gov/wdar-cf-dc-expert  · National Cancer [place] Smoking Quitline: 877-44U-QUIT (428-685-2746)      Date Last Reviewed: 11/17/2014 © 2000-2016 [employer]. [address] [place] [address], [place] [address]. All rights reserved. This information is not intended as a substitute for professional medical care. Always follow your healthcare professional's instructions.        Flexible Bronchoscopy  A flexible bronchoscopy is an exam of the airways of your lungs. A thin, flexible instrument called a bronchoscope is  used. It has a light and small camera that allow the healthcare provider to view your airways.  Call your doctor if you have shortness of breath, a temperature above 101.0°F (38.3°C) for more than 24 hours, or bleeding from your nose or throat. If you have chest pain or severe shortness of breath, call right away.   Before your test    · Follow your healthcare provider's instructions carefully. If you dont, the exam may be canceled or need to be repeated.  · If you are taking blood-thinning medicine, ask your health care provider whether you should stop taking the medicine before this test.  · Have no food or drink for 6 to 12 hours before the test. Also, avoid smoking for 24 hours before the test.  · You will need to remove any dentures or bridgework.  · Right before the test, you will be given sedating medications to help you relax. The medication may be given intravenously (IV) into one of your veins. In addition, your nose and throat may be numbed with a special spray to help prevent gagging and coughing.  · If you are having this test as an outpatient, make sure you have an adult friend or family member to drive you home.  During your test  Bronchoscopy takes 45 to 60 minutes and includes the following steps:  · You may receive anesthesia so that you are unconscious or asleep during the procedure.  · The healthcare provider inserts the tube into your nose or mouth.  · If you have not received anesthesia, you might feel a gagging sensation. To help relieve this feeling, you will be told to swallow or take deep breaths. Your airway will remain open even with the tube in place. But you wont be able to talk.  · The provider examines your breathing passages. He or she may also remove tiny tissue samples for biopsy.  After your test  · You may have a mild sore throat. Your voice may also be hoarse. And, you may have a cough.  · Do not eat or drink until the anesthesia wears off.  · If you had a biopsy, avoid  coughing hard and clearing your throat.  · Call your healthcare provider if you have:  ¨ Shortness of breath  ¨ Chest pain  ¨ Bleeding from your nose or throat  ¨ A fever  Date Last Reviewed: 7/24/2014  © 8629-7596 [employer]. [address] [place] [address], [place] [address]. All rights reserved. This information is not intended as a substitute for professional medical care. Always follow your healthcare professional's instructions.

## 2017-08-04 NOTE — LETTER
August 4, 2017      Sunday Zhu MD  [address] [place] [id number]           [facility]  [address] [place] [address]  Phone: [phone]  Fax: [phone]          Patient: Jesse Chow Sr.   MR Number: [id number]   YOB: 1950   Date of Visit: 8/4/2017       Dear Dr. Sunday Zhu:    Thank you for referring Jesse Chow to me for evaluation. Attached you will find relevant portions of my assessment and plan of care.    If you have questions, please do not hesitate to call me. I look forward to following Jesse Chow along with you.    Sincerely,    Zackery Peña MD    Enclosure  CC:  No Recipients    If you would like to receive this communication electronically, please contact [email or web address] or [phone] to request more information on [employer] Link access.    For providers and/or their staff who would like to refer a patient to [facility], please contact us through our one-stop-shop provider referral line, [facility], at [phone].    If you feel you have received this communication in error or would no longer like to receive these types of communications, please e-mail [email or web address]

## 2017-08-04 NOTE — ASSESSMENT & PLAN NOTE
Assistance with smoking cessation was offered, including:  []  Medications  [x]  Counseling  []  Printed Information on Smoking Cessation  []  Referral to a Smoking Cessation Program    Patient was counseled regarding smoking for 3-10 minutes.

## 2017-08-07 ENCOUNTER — TELEPHONE (OUTPATIENT)
Dept: FAMILY MEDICINE | Facility: CLINIC | Age: 67
End: 2017-08-07

## 2017-08-07 NOTE — TELEPHONE ENCOUNTER
----- Message from Jean Marei sent at 8/7/2017  8:54 AM CDT -----  Contact: Pt  Pt wife was returning the nurse call. Please give pt wife a call at ..[phone] (home) [phone] (work)

## 2017-08-07 NOTE — TELEPHONE ENCOUNTER
Wife called to inform you that, he's to see a Cardiologist the day before his Biopsy on the 16th to rule out whether it's cancer. Also she rescheduled his appt with you until they have completed everything necessary for the biopsy.

## 2017-08-08 ENCOUNTER — TELEPHONE (OUTPATIENT)
Dept: RADIOLOGY | Facility: HOSPITAL | Age: 67
End: 2017-08-08

## 2017-08-09 ENCOUNTER — CLINICAL SUPPORT (OUTPATIENT)
Dept: CARDIOLOGY | Facility: CLINIC | Age: 67
End: 2017-08-09
Payer: MEDICARE

## 2017-08-09 ENCOUNTER — OFFICE VISIT (OUTPATIENT)
Dept: CARDIOLOGY | Facility: CLINIC | Age: 67
End: 2017-08-09
Payer: MEDICARE

## 2017-08-09 ENCOUNTER — HOSPITAL ENCOUNTER (OUTPATIENT)
Dept: RADIOLOGY | Facility: HOSPITAL | Age: 67
Discharge: HOME | End: 2017-08-09
Attending: INTERNAL MEDICINE
Payer: MEDICARE

## 2017-08-09 VITALS
HEART RATE: 72 BPM | BODY MASS INDEX: 27.02 KG/M2 | SYSTOLIC BLOOD PRESSURE: 132 MMHG | WEIGHT: 193 LBS | HEIGHT: 71 IN | DIASTOLIC BLOOD PRESSURE: 68 MMHG

## 2017-08-09 DIAGNOSIS — R29.818 NEUROGENIC CLAUDICATION: ICD-10-CM

## 2017-08-09 DIAGNOSIS — I10 ESSENTIAL HYPERTENSION: Primary | ICD-10-CM

## 2017-08-09 DIAGNOSIS — R94.31 ABNORMAL EKG: ICD-10-CM

## 2017-08-09 DIAGNOSIS — R06.02 SHORTNESS OF BREATH: ICD-10-CM

## 2017-08-09 DIAGNOSIS — E78.5 HYPERLIPIDEMIA, UNSPECIFIED HYPERLIPIDEMIA TYPE: ICD-10-CM

## 2017-08-09 DIAGNOSIS — G89.29 CHRONIC LOW BACK PAIN WITHOUT SCIATICA, UNSPECIFIED BACK PAIN LATERALITY: ICD-10-CM

## 2017-08-09 DIAGNOSIS — Z01.810 PREOP CARDIOVASCULAR EXAM: Primary | ICD-10-CM

## 2017-08-09 DIAGNOSIS — M79.605 PAIN IN BOTH LOWER EXTREMITIES: ICD-10-CM

## 2017-08-09 DIAGNOSIS — R29.898 WEAKNESS OF BOTH LOWER EXTREMITIES: ICD-10-CM

## 2017-08-09 DIAGNOSIS — M54.50 CHRONIC LOW BACK PAIN WITHOUT SCIATICA, UNSPECIFIED BACK PAIN LATERALITY: ICD-10-CM

## 2017-08-09 DIAGNOSIS — M79.604 PAIN IN BOTH LOWER EXTREMITIES: ICD-10-CM

## 2017-08-09 DIAGNOSIS — R91.1 LUNG NODULE: ICD-10-CM

## 2017-08-09 DIAGNOSIS — I10 ESSENTIAL HYPERTENSION: ICD-10-CM

## 2017-08-09 DIAGNOSIS — R91.8 LUNG MASS: ICD-10-CM

## 2017-08-09 DIAGNOSIS — M51.36 DDD (DEGENERATIVE DISC DISEASE), LUMBAR: ICD-10-CM

## 2017-08-09 PROCEDURE — 3078F DIAST BP <80 MM HG: CPT | Mod: S$GLB,,, | Performed by: INTERNAL MEDICINE

## 2017-08-09 PROCEDURE — 3075F SYST BP GE 130 - 139MM HG: CPT | Mod: S$GLB,,, | Performed by: INTERNAL MEDICINE

## 2017-08-09 PROCEDURE — 3008F BODY MASS INDEX DOCD: CPT | Mod: S$GLB,,, | Performed by: INTERNAL MEDICINE

## 2017-08-09 PROCEDURE — 99204 OFFICE O/P NEW MOD 45 MIN: CPT | Mod: S$GLB,,, | Performed by: INTERNAL MEDICINE

## 2017-08-09 PROCEDURE — 99999 PR PBB SHADOW E&M-EST. PATIENT-LVL III: CPT | Mod: PBBFAC,,, | Performed by: INTERNAL MEDICINE

## 2017-08-09 PROCEDURE — 93000 ELECTROCARDIOGRAM COMPLETE: CPT | Mod: S$GLB,,, | Performed by: INTERNAL MEDICINE

## 2017-08-09 PROCEDURE — 78815 PET IMAGE W/CT SKULL-THIGH: CPT | Mod: 26,PI,, | Performed by: RADIOLOGY

## 2017-08-09 PROCEDURE — 99499 UNLISTED E&M SERVICE: CPT | Mod: S$GLB,,, | Performed by: INTERNAL MEDICINE

## 2017-08-09 NOTE — LETTER
August 9, 2017      Zackery Peña MD  [address]  [place] [address]           [facility] Cardiology  [address] [place] [address]  Phone: [phone]  Fax: [phone]          Patient: Jesse Chow Sr.   MR Number: [id number]   YOB: 1950   Date of Visit: 8/9/2017       Dear Dr. Zackery Peña:    Thank you for referring Jesse Chow to me for evaluation. Attached you will find relevant portions of my assessment and plan of care.    If you have questions, please do not hesitate to call me. I look forward to following Jesse Chow along with you.    Sincerely,    Filiberto Nair MD    Enclosure  CC:  No Recipients    If you would like to receive this communication electronically, please contact [email or web address] or [phone] to request more information on [employer] Link access.    For providers and/or their staff who would like to refer a patient to [facility], please contact us through our one-stop-shop provider referral line, [facility], at [phone].    If you feel you have received this communication in error or would no longer like to receive these types of communications, please e-mail [email or web address]

## 2017-08-09 NOTE — PROGRESS NOTES
Subjective:   Patient ID:  Jesse Chow Sr. is a 66 y.o. male who presents for evaluation of No chief complaint on file.      HPI  A 65 yo male with  H/o htn hlp smoker is referred from DR JAVIER FOR PREOP EVAL PRIOR TO ebus  FOR RT LUNG MASS SUGGESTIVE OF MALIGNANCY.NHE HAS DECREASE IN EXERCISE TOLERANCE HE FEELS WEAK HAS LOW ENERGY IN ADDITION TO SHORTNESS OF BREATH THAT is EXERTIONAL AS WELL AS A COUGH THAT IS NOT RESOLVING AND INTERMITETNT WHEEZING HAS NO CHEST PAIN HOWEVER HAS SYMPTOMS OF NEUROGENIC AND REAL ATHEROSCLEROTIC CLAUDICATION SYMPTOMS. THE PATIENT STARTS HAVING HEAVY LEGS THAT ARE GETTING TIRED AT WALKING 2 BLOCKS.  HE  HAS TO STOPP HE ALSO HAS SOME SYMPTOMS WITH STANDING AND LOMGTIME. HE CONTINUES TO SMOKE NO CHEST PAIN. HE AHS AN ABNORMAL EKG SHOWING BIFASCICULAR BLOCK UNCHANGED.  Past Medical History:   Diagnosis Date    Borderline high blood pressure     Chronic low back pain     DDD (degenerative disc disease), lumbar 6/13/2017    H/O: Bell's palsy     H/O: Bell's palsy     Hyperlipidemia     Hypertension     Major depression     Sciatica     Tobacco abuse     Trouble in sleeping        Past Surgical History:   Procedure Laterality Date    HEMORRHOID SURGERY         Social History   Substance Use Topics    Smoking status: Current Every Day Smoker     Packs/day: 1.00     Years: 42.00     Types: Cigarettes    Smokeless tobacco: Never Used      Comment: half a pack in 3 days      Alcohol use 0.6 oz/week     1 Glasses of wine per week      Comment: 1 glass per week       Family History   Problem Relation Age of Onset    Hypertension Mother     Heart disease Mother     Stroke Mother     Heart attack Father 87    Diabetes Sister     Diabetes Brother     Diabetes Brother     Heart disease Brother        Current Outpatient Prescriptions   Medication Sig    amlodipine-benazepril 5-10 mg (LOTREL) 5-10 mg per capsule Take 1 capsule by mouth once daily.    tamsulosin (FLOMAX)  0.4 mg Cp24 Take 1 capsule (0.4 mg total) by mouth once daily.    albuterol (PROAIR HFA) 90 mcg/actuation inhaler INHALE 2 PUFFS INTO THE LUNGS EVERY 6 (SIX) HOURS AS NEEDED FOR WHEEZING.     No current facility-administered medications for this visit.      Current Outpatient Prescriptions on File Prior to Visit   Medication Sig    amlodipine-benazepril 5-10 mg (LOTREL) 5-10 mg per capsule Take 1 capsule by mouth once daily.    tamsulosin (FLOMAX) 0.4 mg Cp24 Take 1 capsule (0.4 mg total) by mouth once daily.    albuterol (PROAIR HFA) 90 mcg/actuation inhaler INHALE 2 PUFFS INTO THE LUNGS EVERY 6 (SIX) HOURS AS NEEDED FOR WHEEZING.     No current facility-administered medications on file prior to visit.        Review of patient's allergies indicates:  No Known Allergies    Review of Systems   Constitution: Positive for weakness, malaise/fatigue and weight loss.   HENT: Negative for headaches.    Eyes: Negative for blurred vision.   Cardiovascular: Positive for claudication and dyspnea on exertion. Negative for chest pain, cyanosis, irregular heartbeat, leg swelling, near-syncope, orthopnea, palpitations and paroxysmal nocturnal dyspnea.   Respiratory: Positive for shortness of breath, snoring and wheezing. Negative for cough and hemoptysis.    Hematologic/Lymphatic: Negative for bleeding problem. Does not bruise/bleed easily.   Skin: Negative for dry skin and itching.   Musculoskeletal: Negative for falls, muscle weakness and myalgias.   Gastrointestinal: Negative for abdominal pain, diarrhea, heartburn, hematemesis, hematochezia and melena.   Genitourinary: Negative for flank pain and hematuria.   Neurological: Negative for dizziness, focal weakness, light-headedness, numbness, paresthesias and seizures.   Psychiatric/Behavioral: Negative for altered mental status and memory loss. The patient is not nervous/anxious.    Allergic/Immunologic: Negative for hives.       Objective:   Physical Exam   Constitutional:  "He is oriented to person, place, and time. He appears well-developed and well-nourished. No distress.   HENT:   Head: Normocephalic and atraumatic.   Eyes: EOM are normal. Pupils are equal, round, and reactive to light. Right eye exhibits no discharge. Left eye exhibits no discharge.   Neck: Neck supple. No JVD present. No thyromegaly present.   Cardiovascular: Normal rate, regular rhythm, normal heart sounds, intact distal pulses and normal pulses.  Exam reveals no gallop and no friction rub.    No murmur heard.  Pulses:       Carotid pulses are 2+ on the right side, and 2+ on the left side.       Radial pulses are 2+ on the right side, and 2+ on the left side.        Femoral pulses are 2+ on the right side with bruit, and 2+ on the left side with bruit.       Popliteal pulses are 2+ on the right side, and 2+ on the left side.        Dorsalis pedis pulses are 2+ on the right side, and 2+ on the left side.        Posterior tibial pulses are 2+ on the right side, and 2+ on the left side.   Pulmonary/Chest: Effort normal. No respiratory distress. He has wheezes. He has no rales. He exhibits no tenderness.   Decrease breath sounds on the right   Abdominal: Soft. Bowel sounds are normal. He exhibits no distension. There is no tenderness.   Musculoskeletal: Normal range of motion. He exhibits no edema.   Neurological: He is alert and oriented to person, place, and time. No cranial nerve deficit.   Skin: Skin is warm and dry. No rash noted. He is not diaphoretic. No erythema.   Psychiatric: He has a normal mood and affect. His behavior is normal.   Nursing note and vitals reviewed.    Vitals:    08/09/17 1659   BP: 132/68   Pulse: 72   Weight: 87.5 kg (193 lb)   Height: 5' 10.5" (1.791 m)     Lab Results   Component Value Date    CHOL 196 05/30/2017    CHOL 198 03/30/2016    CHOL 177 12/18/2009     Lab Results   Component Value Date    HDL 35 (L) 05/30/2017    HDL 34 (L) 03/30/2016    HDL 38 (L) 12/18/2009     Lab Results "   Component Value Date    LDLCALC 144.4 05/30/2017    LDLCALC 146.2 03/30/2016    LDLCALC 119.0 12/18/2009     Lab Results   Component Value Date    TRIG 83 05/30/2017    TRIG 89 03/30/2016    TRIG 100 12/18/2009     Lab Results   Component Value Date    CHOLHDL 17.9 (L) 05/30/2017    CHOLHDL 17.2 (L) 03/30/2016    CHOLHDL 21.5 12/18/2009       Chemistry        Component Value Date/Time     06/13/2017 1607    K 5.0 06/13/2017 1607     06/13/2017 1607    CO2 29 06/13/2017 1607    BUN 9 06/13/2017 1607    CREATININE 0.8 07/18/2017 1010    GLU 83 06/13/2017 1607        Component Value Date/Time    CALCIUM 9.1 06/13/2017 1607    ALKPHOS 80 05/30/2017 1030    AST 17 05/30/2017 1030    ALT 14 05/30/2017 1030    BILITOT 0.5 05/30/2017 1030    ESTGFRAFRICA >60 07/18/2017 1010    EGFRNONAA >60 07/18/2017 1010          Lab Results   Component Value Date    TSH 1.347 05/30/2017     No results found for: INR, PROTIME  Lab Results   Component Value Date    WBC 10.28 06/13/2017    HGB 13.0 (L) 06/13/2017    HCT 40.1 06/13/2017    MCV 79 (L) 06/13/2017     06/13/2017     BNP  @LABRCNTIP(BNP,BNPTRIAGEBLO)@  CrCl cannot be calculated (Patient's most recent sCr result is older than the maximum 7 days allowed.).  Assessment:     1. Preop cardiovascular exam    2. Hyperlipidemia, unspecified hyperlipidemia type    3. Chronic low back pain without sciatica, unspecified back pain laterality    4. Essential hypertension    5. Weakness of both lower extremities    6. Neurogenic claudication    7. Pain in both lower extremities    8. DDD (degenerative disc disease), lumbar    9. Lung nodule    10. Lung mass    11. Shortness of breath    12. Abnormal EKG      Has evidence of pvd on exam and clinically with multiple risk factors for cad and symptoms of decrease exercise tolerance that could be partly ffrom malignancy need to r/o any cad prior to his EBUS  Plan:   ECHO   LEXISCAN  MIYA WITH EXERCISE  SMOKING CESSATION  COUNSELING DONE  PHONE REVIEW

## 2017-08-10 ENCOUNTER — CLINICAL SUPPORT (OUTPATIENT)
Dept: CARDIOLOGY | Facility: CLINIC | Age: 67
End: 2017-08-10
Payer: MEDICARE

## 2017-08-10 ENCOUNTER — HOSPITAL ENCOUNTER (OUTPATIENT)
Dept: RADIOLOGY | Facility: HOSPITAL | Age: 67
Discharge: HOME | End: 2017-08-10
Attending: INTERNAL MEDICINE
Payer: MEDICARE

## 2017-08-10 DIAGNOSIS — R29.818 NEUROGENIC CLAUDICATION: ICD-10-CM

## 2017-08-10 DIAGNOSIS — R94.31 ABNORMAL EKG: ICD-10-CM

## 2017-08-10 DIAGNOSIS — Z01.810 PREOP CARDIOVASCULAR EXAM: ICD-10-CM

## 2017-08-10 DIAGNOSIS — R06.02 SHORTNESS OF BREATH: ICD-10-CM

## 2017-08-10 LAB
DIASTOLIC DYSFUNCTION: NO
VASCULAR ANKLE BRACHIAL INDEX (ABI) LEFT: 1.14 (ref 0.9–1.2)

## 2017-08-10 PROCEDURE — 78452 HT MUSCLE IMAGE SPECT MULT: CPT | Mod: 26,,, | Performed by: INTERNAL MEDICINE

## 2017-08-10 PROCEDURE — 93924 LWR XTR VASC STDY BILAT: CPT | Mod: S$GLB,,, | Performed by: INTERNAL MEDICINE

## 2017-08-10 PROCEDURE — 93015 CV STRESS TEST SUPVJ I&R: CPT | Mod: S$GLB,,, | Performed by: INTERNAL MEDICINE

## 2017-08-10 PROCEDURE — 93306 TTE W/DOPPLER COMPLETE: CPT | Mod: S$GLB,,, | Performed by: INTERNAL MEDICINE

## 2017-08-11 ENCOUNTER — PROCEDURE VISIT (OUTPATIENT)
Dept: PULMONOLOGY | Facility: CLINIC | Age: 67
End: 2017-08-11
Payer: MEDICARE

## 2017-08-11 DIAGNOSIS — R91.1 LUNG NODULE: ICD-10-CM

## 2017-08-11 DIAGNOSIS — R91.8 LUNG MASS: ICD-10-CM

## 2017-08-11 LAB
DIASTOLIC DYSFUNCTION: NO
ESTIMATED PA SYSTOLIC PRESSURE: 13.76
RETIRED EF AND QEF - SEE NOTES: 60 (ref 55–65)

## 2017-08-11 PROCEDURE — 94060 EVALUATION OF WHEEZING: CPT | Mod: S$GLB,,, | Performed by: INTERNAL MEDICINE

## 2017-08-11 PROCEDURE — 94726 PLETHYSMOGRAPHY LUNG VOLUMES: CPT | Mod: S$GLB,,, | Performed by: INTERNAL MEDICINE

## 2017-08-11 PROCEDURE — 94729 DIFFUSING CAPACITY: CPT | Mod: S$GLB,,, | Performed by: INTERNAL MEDICINE

## 2017-08-15 ENCOUNTER — TELEPHONE (OUTPATIENT)
Dept: PULMONOLOGY | Facility: CLINIC | Age: 67
End: 2017-08-15

## 2017-08-15 NOTE — TELEPHONE ENCOUNTER
----- Message from Aida Salter sent at 8/15/2017 11:25 AM CDT -----  Contact: pt's wife  She's calling with questions about pt's surgery tomorrow, please advise [phone] (home)

## 2017-08-16 ENCOUNTER — ANESTHESIA EVENT (OUTPATIENT)
Dept: ENDOSCOPY | Facility: HOSPITAL | Age: 67
End: 2017-08-16
Payer: MEDICARE

## 2017-08-16 ENCOUNTER — SURGERY (OUTPATIENT)
Age: 67
End: 2017-08-16

## 2017-08-16 ENCOUNTER — HOSPITAL ENCOUNTER (OUTPATIENT)
Facility: HOSPITAL | Age: 67
Discharge: HOME | End: 2017-08-16
Attending: INTERNAL MEDICINE | Admitting: INTERNAL MEDICINE
Payer: MEDICARE

## 2017-08-16 ENCOUNTER — ANESTHESIA (OUTPATIENT)
Dept: ENDOSCOPY | Facility: HOSPITAL | Age: 67
End: 2017-08-16
Payer: MEDICARE

## 2017-08-16 VITALS
OXYGEN SATURATION: 96 % | HEART RATE: 90 BPM | WEIGHT: 191 LBS | TEMPERATURE: 99 F | BODY MASS INDEX: 26.74 KG/M2 | DIASTOLIC BLOOD PRESSURE: 64 MMHG | RESPIRATION RATE: 21 BRPM | SYSTOLIC BLOOD PRESSURE: 121 MMHG | HEIGHT: 71 IN

## 2017-08-16 DIAGNOSIS — R91.8 LUNG MASS: ICD-10-CM

## 2017-08-16 PROCEDURE — 31654 BRONCH EBUS IVNTJ PERPH LES: CPT | Performed by: INTERNAL MEDICINE

## 2017-08-16 PROCEDURE — 31625 BRONCHOSCOPY W/BIOPSY(S): CPT | Performed by: INTERNAL MEDICINE

## 2017-08-16 PROCEDURE — 88172 CYTP DX EVAL FNA 1ST EA SITE: CPT | Mod: 26,59,, | Performed by: PATHOLOGY

## 2017-08-16 PROCEDURE — 25000003 PHARM REV CODE 250: Performed by: INTERNAL MEDICINE

## 2017-08-16 PROCEDURE — 31623 DX BRONCHOSCOPE/BRUSH: CPT | Mod: 51,RT | Performed by: INTERNAL MEDICINE

## 2017-08-16 PROCEDURE — 88104 CYTOPATH FL NONGYN SMEARS: CPT | Mod: 26,,, | Performed by: PATHOLOGY

## 2017-08-16 PROCEDURE — 31623 DX BRONCHOSCOPE/BRUSH: CPT | Mod: 51,RT,, | Performed by: INTERNAL MEDICINE

## 2017-08-16 PROCEDURE — 25000003 PHARM REV CODE 250: Performed by: NURSE ANESTHETIST, CERTIFIED REGISTERED

## 2017-08-16 PROCEDURE — 37000008 HC ANESTHESIA 1ST 15 MINUTES: Performed by: INTERNAL MEDICINE

## 2017-08-16 PROCEDURE — 88305 TISSUE EXAM BY PATHOLOGIST: CPT | Mod: 26,59,, | Performed by: PATHOLOGY

## 2017-08-16 PROCEDURE — 31653 BRONCH EBUS SAMPLNG 3/> NODE: CPT | Performed by: INTERNAL MEDICINE

## 2017-08-16 PROCEDURE — 88104 CYTOPATH FL NONGYN SMEARS: CPT | Performed by: PATHOLOGY

## 2017-08-16 PROCEDURE — 88305 TISSUE EXAM BY PATHOLOGIST: CPT | Performed by: PATHOLOGY

## 2017-08-16 PROCEDURE — 27200944 HC BRONCH FORCEPS DISPOSABLE: Performed by: INTERNAL MEDICINE

## 2017-08-16 PROCEDURE — 88305 TISSUE EXAM BY PATHOLOGIST: CPT | Mod: 26,,, | Performed by: PATHOLOGY

## 2017-08-16 PROCEDURE — 31625 BRONCHOSCOPY W/BIOPSY(S): CPT | Mod: 59,RT,, | Performed by: INTERNAL MEDICINE

## 2017-08-16 PROCEDURE — 63600175 PHARM REV CODE 636 W HCPCS: Performed by: NURSE ANESTHETIST, CERTIFIED REGISTERED

## 2017-08-16 PROCEDURE — 37000009 HC ANESTHESIA EA ADD 15 MINS: Performed by: INTERNAL MEDICINE

## 2017-08-16 PROCEDURE — 88173 CYTOPATH EVAL FNA REPORT: CPT | Mod: 26,59,, | Performed by: PATHOLOGY

## 2017-08-16 PROCEDURE — 88172 CYTP DX EVAL FNA 1ST EA SITE: CPT | Performed by: PATHOLOGY

## 2017-08-16 PROCEDURE — 31653 BRONCH EBUS SAMPLNG 3/> NODE: CPT | Mod: ,,, | Performed by: INTERNAL MEDICINE

## 2017-08-16 RX ORDER — MIDAZOLAM HYDROCHLORIDE 1 MG/ML
INJECTION, SOLUTION INTRAMUSCULAR; INTRAVENOUS
Status: DISCONTINUED | OUTPATIENT
Start: 2017-08-16 | End: 2017-08-16

## 2017-08-16 RX ORDER — PROPOFOL 10 MG/ML
VIAL (ML) INTRAVENOUS
Status: DISCONTINUED | OUTPATIENT
Start: 2017-08-16 | End: 2017-08-16

## 2017-08-16 RX ORDER — ROCURONIUM BROMIDE 10 MG/ML
INJECTION, SOLUTION INTRAVENOUS
Status: DISCONTINUED | OUTPATIENT
Start: 2017-08-16 | End: 2017-08-16

## 2017-08-16 RX ORDER — SUCCINYLCHOLINE CHLORIDE 20 MG/ML
INJECTION INTRAMUSCULAR; INTRAVENOUS
Status: DISCONTINUED | OUTPATIENT
Start: 2017-08-16 | End: 2017-08-16

## 2017-08-16 RX ORDER — SODIUM CHLORIDE, SODIUM LACTATE, POTASSIUM CHLORIDE, CALCIUM CHLORIDE 600; 310; 30; 20 MG/100ML; MG/100ML; MG/100ML; MG/100ML
INJECTION, SOLUTION INTRAVENOUS CONTINUOUS
Status: DISCONTINUED | OUTPATIENT
Start: 2017-08-16 | End: 2017-08-16 | Stop reason: HOSPADM

## 2017-08-16 RX ORDER — LIDOCAINE HCL/PF 100 MG/5ML
SYRINGE (ML) INTRAVENOUS
Status: DISCONTINUED | OUTPATIENT
Start: 2017-08-16 | End: 2017-08-16

## 2017-08-16 RX ADMIN — MIDAZOLAM HYDROCHLORIDE 2 MG: 1 INJECTION, SOLUTION INTRAMUSCULAR; INTRAVENOUS at 09:08

## 2017-08-16 RX ADMIN — LIDOCAINE HYDROCHLORIDE 80 MG: 20 INJECTION, SOLUTION INTRAVENOUS at 09:08

## 2017-08-16 RX ADMIN — SODIUM CHLORIDE, SODIUM LACTATE, POTASSIUM CHLORIDE, AND CALCIUM CHLORIDE: .6; .31; .03; .02 INJECTION, SOLUTION INTRAVENOUS at 08:08

## 2017-08-16 RX ADMIN — ROCURONIUM BROMIDE 5 MG: 10 INJECTION, SOLUTION INTRAVENOUS at 09:08

## 2017-08-16 RX ADMIN — SUCCINYLCHOLINE CHLORIDE 140 MG: 20 INJECTION, SOLUTION INTRAMUSCULAR; INTRAVENOUS at 09:08

## 2017-08-16 RX ADMIN — PROPOFOL 150 MG: 10 INJECTION, EMULSION INTRAVENOUS at 09:08

## 2017-08-16 NOTE — PLAN OF CARE
Pt sitting up in bed coughing frequently. Family updated upon pt arrival to recovery room. Pt reports no pain at this time.

## 2017-08-16 NOTE — TRANSFER OF CARE
"Anesthesia Transfer of Care Note    Patient: Jesse Chow    Procedure(s) Performed: Procedure(s) (LRB):  BRONCHOSCOPY (Bilateral)    Patient location: PACU    Anesthesia Type: general    Transport from OR: Transported from OR on room air with adequate spontaneous ventilation    Post pain: adequate analgesia    Post assessment: no apparent anesthetic complications and tolerated procedure well    Post vital signs: stable    Level of consciousness: awake    Nausea/Vomiting: no nausea/vomiting    Complications: none    Transfer of care protocol was followed      Last vitals:   Visit Vitals  BP (!) 169/77   Pulse 109   Temp 36.7 °C (98.1 °F) (Temporal)   Resp 18   Ht 5' 10.5" (1.791 m)   Wt 86.6 kg (191 lb)   SpO2 97%   BMI 27.02 kg/m²     "

## 2017-08-16 NOTE — BRIEF OP NOTE
Bronchoscopy performed  Right main stem mass visualized  EBUS performed  4L: 11.4 mm, 3 passes: prelim non diagnostic  7: 25.1 mm: Diagnostic: 5 passes's  4R : 7.7 mm: 3 passes's    Wash Right main stem for cytology  Cytology brush Right main stem  X 2  Endobronchial biopsy right main stem x 4 passess

## 2017-08-16 NOTE — DISCHARGE INSTRUCTIONS
General Information:  1.  Do not drink alcoholic beverages including beer for 24 hours or as long as you are on pain medication..  2.  Do not drive a motor vehicle, operate machinery or power tools, or signs legal papers for 24 hours or as long as you are on pain medication.   3.  You may experience light-headedness, dizziness, and sleepiness following surgery. Please do not stay alone. A responsible adult should be with you for this 24 hour period.  4.  Go home and rest.  5. Progress slowly to a normal diet unless instructed.  Otherwise, begin with liquids such as soft drinks, then soup and crackers working up to solid foods. Drink plenty of nonalcoholic fluids.  6.  Certain anesthetics and pain medications produce nausea and vomiting in certain       individuals. If nausea becomes a problem at home, call you doctor.  7. A nurse will be calling you sometime after surgery. Do not be alarmed. This is our way of finding out how you are doing.  8. Several times every hour while you are awake, take 2-3 deep breaths and cough. If you had stomach surgery hold a pillow or rolled towel firmly against your stomach before you cough. This will help with any pain the cough might cause.  9. Several times every hour while you are awake, pump and flex your feet 5-6 times and do foot circles. This will help prevent blood clots.  10.Call your doctor for severe pain, bleeding, fever, or signs or symptoms of infection (pain, swelling, redness, foul odor, drainage).  11.You can contact your doctor anytime by [phone] for the [facility] (at [facility]) or [phone] for the [facility] on [facility].   [email or web address] is another way to contact your doctor if you are an active participant online with [facility].

## 2017-08-16 NOTE — ANESTHESIA POSTPROCEDURE EVALUATION
"Anesthesia Post Evaluation    Patient: Jesse Chow    Procedure(s) Performed: Procedure(s) (LRB):  BRONCHOSCOPY (Bilateral)    Final Anesthesia Type: MAC  Patient location during evaluation: GI PACU  Patient participation: Yes- Able to Participate  Level of consciousness: awake and alert  Post-procedure vital signs: reviewed and stable  Pain management: adequate  Airway patency: patent  PONV status at discharge: No PONV  Anesthetic complications: no      Cardiovascular status: blood pressure returned to baseline  Respiratory status: unassisted  Hydration status: euvolemic  Follow-up not needed.        Visit Vitals  /64   Pulse 90   Temp 37 °C (98.6 °F) (Temporal)   Resp (!) 21   Ht 5' 10.5" (1.791 m)   Wt 86.6 kg (191 lb)   SpO2 96%   BMI 27.02 kg/m²       Pain/Rahul Score: Pain Assessment Performed: Yes (8/16/2017 11:00 AM)  Presence of Pain: denies (8/16/2017 12:00 PM)  Rahul Score: 9 (8/16/2017 12:00 PM)      "

## 2017-08-16 NOTE — DISCHARGE SUMMARY
"[facility]  Discharge Summary     Patient ID:  Jesse Chow  [id number]  66 y.o.  1950    Admit date: 8/16/2017    Discharge Date and Time:  08/16/2017 10:51 AM    Admitting Physician: Zackery Peña MD     Discharge Provider: Zackery Peña    Reason for Admission: Lung mass [R91.8]  Lung nodule [R91.1]    Admission Condition: good    Procedures Performed: Procedure(s) (LRB):  BRONCHOSCOPY (Bilateral)    Hospital Course   Bronchoscopy performed  Right main stem mass visualized  EBUS performed  4L: 11.4 mm, 3 passes: prelim non diagnostic  7: 25.1 mm: Diagnostic: 5 passes's  4R : 7.7 mm: 3 passes's    Wash Right main stem for cytology  Cytology brush Right main stem  X 2  Endobronchial biopsy right main stem x 4 passess       Consults: None    Significant Diagnostic Studies: Chest CT and PET reviewed    Final Diagnoses:    Principal Problem: Lung mass   Secondary Diagnoses:   Active Hospital Problems    Diagnosis  POA    *Lung mass [R91.8]  Yes    Endobronchial mass [R22.2]  Unknown      Resolved Hospital Problems    Diagnosis Date Resolved POA   No resolved problems to display.       Discharged Condition: good    Discharge Exam:  /67 (BP Location: Left arm, Patient Position: Lying)   Pulse (!) 59   Temp 98.6 °F (37 °C) (Oral)   Resp 20   Ht 5' 10.5" (1.791 m)   Wt 86.6 kg (191 lb)   SpO2 99%   BMI 27.02 kg/m²   General appearance: alert, appears stated age and cooperative    Disposition: Home or Self Care    Follow Up/Patient Instructions:     Medications:  Reconciled Home Medications:   Current Discharge Medication List      CONTINUE these medications which have NOT CHANGED    Details   amlodipine-benazepril 5-10 mg (LOTREL) 5-10 mg per capsule Take 1 capsule by mouth once daily.  Qty: 30 capsule, Refills: 3    Associated Diagnoses: Essential hypertension      tamsulosin (FLOMAX) 0.4 mg Cp24 Take 1 capsule (0.4 mg total) by mouth once daily.  Qty: 30 capsule, Refills: " 5    Associated Diagnoses: Benign non-nodular prostatic hyperplasia without lower urinary tract symptoms      albuterol (PROAIR HFA) 90 mcg/actuation inhaler INHALE 2 PUFFS INTO THE LUNGS EVERY 6 (SIX) HOURS AS NEEDED FOR WHEEZING.  Qty: 16 g, Refills: 3           No discharge procedures on file.    Activity: activity as tolerated  Diet: regular diet  Wound Care: as directed and none needed    Follow-up with office in 2 weeks.    Signed:  Zackery Peña  8/16/2017  10:50 AM

## 2017-08-16 NOTE — ANESTHESIA PREPROCEDURE EVALUATION
08/16/2017  Jesse Chow is a 66 y.o., male.    Anesthesia Evaluation    I have reviewed the Patient Summary Reports.    I have reviewed the Nursing Notes.   I have reviewed the Medications.     Review of Systems  Anesthesia Hx:  No problems with previous Anesthesia  Denies Family Hx of Anesthesia complications.   Denies Personal Hx of Anesthesia complications.   Social:  Former Smoker, Social Alcohol Use    Hematology/Oncology:     Oncology Normal    -- Anemia:   Cardiovascular:   Hypertension Denies MI.   Denies CABG/stent.      hyperlipidemia ECG has been reviewed. ekg 8/2017:  Normal sinus rhythm 72  Right bundle branch block  Left anterior fascicular block   Bifascicular block   Minimal voltage criteria for LVH, may be normal variant  Abnormal ECG  No previous ECGs available    Echo 8/2017:   1 - Concentric hypertrophy.     2 - No wall motion abnormalities.     3 - Normal left ventricular systolic function (EF 60-65%).     4 - Normal left ventricular diastolic function.     5 - Normal right ventricular systolic function .     6 - The estimated PA systolic pressure is 14 mmHg   Pulmonary:   Denies COPD.  Denies Asthma.  Denies Sleep Apnea. Lung mass   Renal/:   BPH    Hepatic/GI:   Denies GERD. Denies Liver Disease.  Denies Hepatitis.    Musculoskeletal:   DDD   Neurological:   Denies CVA. Denies Seizures. Chronic low back pain  Sciatica  H/O: Bell's palsy  Weakness of both lower extremities   Endocrine:  Endocrine Normal    Psych:   depression          Physical Exam  General:  Well nourished    Airway/Jaw/Neck:  Airway Findings: Mouth Opening: Normal Tongue: Normal  General Airway Assessment: Adult  Mallampati: II      Dental:  Dental Findings: Upper Dentures   Chest/Lungs:  Chest/Lungs Findings: Expiratory Wheezes, Mild, Normal Respiratory Rate     Heart/Vascular:  Heart Findings: Rate: Normal   Rhythm: Regular Rhythm  Sounds: Normal             Anesthesia Plan  Type of Anesthesia, risks & benefits discussed:  Anesthesia Type:  general  Patient's Preference:   Intra-op Monitoring Plan: standard ASA monitors  Intra-op Monitoring Plan Comments:   Post Op Pain Control Plan:   Post Op Pain Control Plan Comments:   Induction:   IV  Beta Blocker:  Patient is not currently on a Beta-Blocker (No further documentation required).       Informed Consent: Patient understands risks and agrees with Anesthesia plan.  Questions answered. Anesthesia consent signed with patient.  ASA Score: 3     Day of Surgery Review of History & Physical: I have interviewed and examined the patient. I have reviewed the patient's H&P dated:  There are no significant changes.  H&P update referred to the surgeon.         Ready For Surgery From Anesthesia Perspective.

## 2017-08-18 ENCOUNTER — TELEPHONE (OUTPATIENT)
Dept: CARDIOLOGY | Facility: CLINIC | Age: 67
End: 2017-08-18

## 2017-08-22 ENCOUNTER — OFFICE VISIT (OUTPATIENT)
Dept: PULMONOLOGY | Facility: CLINIC | Age: 67
End: 2017-08-22
Payer: MEDICARE

## 2017-08-22 VITALS
BODY MASS INDEX: 27.51 KG/M2 | SYSTOLIC BLOOD PRESSURE: 120 MMHG | RESPIRATION RATE: 18 BRPM | DIASTOLIC BLOOD PRESSURE: 62 MMHG | HEIGHT: 70 IN | OXYGEN SATURATION: 97 % | WEIGHT: 192.13 LBS | HEART RATE: 63 BPM

## 2017-08-22 DIAGNOSIS — R91.8 ENDOBRONCHIAL MASS: ICD-10-CM

## 2017-08-22 DIAGNOSIS — Z72.0 TOBACCO ABUSE: ICD-10-CM

## 2017-08-22 DIAGNOSIS — C34.91 SQUAMOUS CELL CARCINOMA OF RIGHT LUNG: Primary | Chronic | ICD-10-CM

## 2017-08-22 PROBLEM — Z01.810 PREOP CARDIOVASCULAR EXAM: Status: RESOLVED | Noted: 2017-08-09 | Resolved: 2017-08-22

## 2017-08-22 PROBLEM — R91.1 LUNG NODULE: Status: RESOLVED | Noted: 2017-07-11 | Resolved: 2017-08-22

## 2017-08-22 PROCEDURE — 99999 PR PBB SHADOW E&M-EST. PATIENT-LVL III: CPT | Mod: PBBFAC,,, | Performed by: INTERNAL MEDICINE

## 2017-08-22 PROCEDURE — 3008F BODY MASS INDEX DOCD: CPT | Mod: S$GLB,,, | Performed by: INTERNAL MEDICINE

## 2017-08-22 PROCEDURE — 3078F DIAST BP <80 MM HG: CPT | Mod: S$GLB,,, | Performed by: INTERNAL MEDICINE

## 2017-08-22 PROCEDURE — 3074F SYST BP LT 130 MM HG: CPT | Mod: S$GLB,,, | Performed by: INTERNAL MEDICINE

## 2017-08-22 PROCEDURE — 99214 OFFICE O/P EST MOD 30 MIN: CPT | Mod: S$GLB,,, | Performed by: INTERNAL MEDICINE

## 2017-08-22 PROCEDURE — 99499 UNLISTED E&M SERVICE: CPT | Mod: S$GLB,,, | Performed by: INTERNAL MEDICINE

## 2017-08-22 NOTE — PATIENT INSTRUCTIONS
What Is Lung Cancer?    Lung cancer causes some cells in the lungs to grow out of control. These cells are called cancer cells. Cancer cells grow at a different rate than normal cells. Their size and shape are also not normal. Cancer cells can spread to other areas in the lungs. Or, they can travel to other parts of the body.  Non-small cell lung cancer  Most cases of lung cancer are called non-small cell. There are a few different types of non-small cell lung cancer. These include squamous cell carcinoma, adenocarcinoma, and large cell carcinoma. When cancer cells grow together in one area, they form a tumor.  Small cell lung cancer  Small cell lung cancer is less common than non-small cell lung cancer. Small cell cancer is sometimes called oat cell. (The cells are shaped like oats when viewed under a microscope.) This type of lung cancer may grow and spread faster than non-small cell cancer.  How cancer spreads  Cancer cells dont always stay in one place. They may enter the lymph system (a network of nodes and vessels that help your body fight disease). Cancer can also enter the bloodstream and spread to other parts of the body. When cancer spreads, the process is called metastasis. Lung cancer that spreads (metastasizes) often goes to the other lung or the liver, brain, or bones.   Date Last Reviewed: 1/3/2016  © 5904-2651 [employer]. [address] [place] [address], [place] [address]. All rights reserved. This information is not intended as a substitute for professional medical care. Always follow your healthcare professional's instructions.        Diagnosing and Staging Lung Cancer     A bronchoscope provides a direct view of the windpipe and bronchial tubes.     If your healthcare provider thinks you may have lung cancer, he or she will most likely order a number of tests. These tests can diagnose lung cancer and reveal the type of cancer, where its located, and if, or how much, it has spread. Test  results may also help your healthcare provider plan treatment.  What do the tests show?  Each test result for lung cancer offers a new piece of information. Taken as a whole, the results reveal precise details about your cancer and how advanced it is. For instance, do you have non-small-cell or small-cell lung cancer? How large is the tumor? Where is the tumor located? Are the lymph nodes involved? Has the cancer spread? With these details, you and your healthcare provider can start to plan treatment.  Biopsy  In a biopsy, a small sample of tissue is removed. It can be taken from a tumor in the lung or from other parts of your body. That sample is then studied under a microscope to learn more about your cancer. The following tests can be done to obtain a biopsy.  · During bronchoscopy, a thin, lighted, flexible tube (bronchoscope) goes into the nose and down the windpipe. The healthcare provider then has a direct view of the windpipe and bronchial tubes. Tissue samples may be taken. The walls of the windpipe and bronchial tubes may also be brushed and rinsed. This loosens cells. The cells can then be removed and studied in a lab.  · For an endobronchial ultrasound (EBUS), a bronchoscope is used with ultrasound (image made from sound waves) to look at the lymph nodes and other structures between the lungs. In a similar test known as endoscopic esophageal ultrasound, a scope is passed down the esophagus to look at these structures.   · In a fine-needle aspiration (FNA), a very thin needle is used to remove cells from a tumor. This test is done under local anesthesia to help prevent pain. During the FNA, you may have a CT scan. This helps the healthcare provider position the needle exactly where it needs to be.  Imaging tests  Pictures from different imaging tests can provide details about your lungs and any tumors they may have. These pictures can also show a tumors location and size. A chest X-ray is one of the most  common imaging tests.  Other imaging tests will likely be done. A CT scan is a computer-enhanced X-ray image. A PET scan (positron emission tomography) uses a slightly radioactive liquid (tracer) to find areas where cancer cells are in the body. A PET scan is often done along with a CT scan. It can detect a tumor that may not appear on a chest X-ray. Less often, an MRI may be done. This test uses strong magnets and computers to help form a highly detailed image. You may also have a bone scan or other imaging tests to learn whether the cancer has spread. Your healthcare provider will tell you how to prepare for any tests.  Staging of lung cancer  Staging is a process to measure how advanced the cancer is. Stage 1 is the least advanced. Stage 4 is the most advanced. The following three factors are considered:  · The tumor. How large is it? Has it reached other nearby structures?  · The nearby lymph nodes. Have they been affected? If so, which lymph nodes--the lymph nodes near the tumor, or the lymph nodes in the center of the chest (mediastinal lymph nodes)? If the mediastinal lymph nodes are affected, are both sides affected, or are they only affected on the same side as the tumor?   · Metastasis. Has the cancer spread to other parts of the body?  When planning treatment for small cell lung cancer, healthcare providers are usually more concerned about whether radiation therapy can be used to treat the cancer. These cancers are typically just divided into limited stage disease (which can be treated with radiation) and extensive stage disease (which has spread too far to be treated with radiation).    Date Last Reviewed: 1/3/2016  © 0084-5591 [employer]. [address] [place] [address], [place] [address]. All rights reserved. This information is not intended as a substitute for professional medical care. Always follow your healthcare professional's instructions.        Chronic Lung Disease: Tips for Quitting  Smoking  Cigarette smoke damages lung tissue and irritates airways, which makes breathing harder. Smoking also damages cilia (tiny hairs) in the airways, so the cilia cant do their job of clearing mucus, dirt, and germs from the lungs. Its never too late to quit smoking. Your health will start to improve on the same day you stub out your last cigarette.    You dont have to quit alone  You may be more likely to quit for good if you seek support from others.  · Talk with your healthcare provider about your plans to quit. Ask about medicines that can help. Some contain nicotine and some do not. Some are available by prescription. You can buy others over-the-counter. These medicines help control the desire to use tobacco and the uncomfortable symptoms people have when they try to quit. Others gradually lessen the level of nicotine in the body. Your healthcare provider can tell you about all the choices available including:  ¨ Oral medicines such as bupropion or varenicline  ¨ Nicotine replacement therapy such as gum, lozenge, a patch, inhaler, or nasal spray  · Join a support group or get advice from an ex-smoker.  · Ask other smokers in your household to quit with you.   Tips for quitting smoking  There isnt one right way to stop smoking. Everyone quits in his or her own way. Some of these tips may help:  · Make a list of reasons you want to quit. Keep this list and read it often.  · Pick a date to quit smoking. Then stick to it.  · List the things that make you want to smoke. Think of ways to avoid these triggers.  · Set goals for yourself, such as going for a week without smoking. Reward yourself when you meet your goals.  · If you dont quit the first time, keep trying! Many people have to try more than once before they stop smoking for good.     For more information  · smokefree.gov/jwpb-nd-no-expert  · National [employer] [place] Smoking Quitline: 877-44U-QUIT (787-835-1833)   Date Last Reviewed: 5/1/2016  ©  7548-6762 [employer]. [address] [place] [address], [place] [address]. All rights reserved. This information is not intended as a substitute for professional medical care. Always follow your healthcare professional's instructions.

## 2017-08-22 NOTE — PROGRESS NOTES
Subjective:       Jesse Chow is a 66 y.o. male self-referred for follow-up of lung cancer.   Current symptoms include  constant cough.   He denies hemoptysis  .   Associated symptoms include NONE.  Weight has been stable.    Appetite has been unaffected.   SP EBUS  Level 7 +ve  Biopsy from endobronchial mass Right main stem pending    Previous Lung Cancer History:  He was first diagnosed with lung cancer today   ago. Cancer is of SQUAMOUS type.   Symptoms at diagnosis included constant cough. Work up thus far has included Chest X-ray, Chest CT, Bronchoscopy, PET scan.   Treatment thus far has included NONE.      The following portions of the patient's history were reviewed and updated as appropriate:   He  has a past medical history of Borderline high blood pressure; Chronic low back pain; DDD (degenerative disc disease), lumbar (6/13/2017); H/O: Bell's palsy; H/O: Bell's palsy; Hyperlipidemia; Hypertension; Major depression; Sciatica; Tobacco abuse; and Trouble in sleeping.  He  does not have any pertinent problems on file.  He  has a past surgical history that includes Hemorrhoid surgery and Neck mass excision (Right).  His family history includes Diabetes in his brother, brother, and sister; Heart attack (age of onset: 87) in his father; Heart disease in his brother and mother; Hypertension in his mother; Stroke in his mother.  He  reports that he quit smoking 9 days ago. His smoking use included Cigarettes. He has a 42.00 pack-year smoking history. He has never used smokeless tobacco. He reports that he drinks about 0.6 oz of alcohol per week . He reports that he does not use drugs.  He has a current medication list which includes the following prescription(s): albuterol, amlodipine-benazepril 5-10 mg, and tamsulosin.  Current Outpatient Prescriptions on File Prior to Visit   Medication Sig Dispense Refill    albuterol (PROAIR HFA) 90 mcg/actuation inhaler INHALE 2 PUFFS INTO THE LUNGS EVERY 6 (SIX) HOURS  "AS NEEDED FOR WHEEZING. 16 g 3    amlodipine-benazepril 5-10 mg (LOTREL) 5-10 mg per capsule Take 1 capsule by mouth once daily. 30 capsule 3    tamsulosin (FLOMAX) 0.4 mg Cp24 Take 1 capsule (0.4 mg total) by mouth once daily. 30 capsule 5     No current facility-administered medications on file prior to visit.      He has No Known Allergies..    Review of Systems  A comprehensive review of systems was negative.      Objective:     Vitals:    08/22/17 1603   BP: 120/62   Pulse: 63   Resp: 18   SpO2: 97%   Weight: 87.1 kg (192 lb 2.1 oz)   Height: 5' 10" (1.778 m)     Physical Exam   Constitutional: He appears well-developed and well-nourished.   HENT:   Head: Normocephalic and atraumatic.   Eyes: EOM are normal. Pupils are equal, round, and reactive to light.   Pulmonary/Chest: Effort normal.   Musculoskeletal: Normal range of motion.   Neurological: He is alert.   Skin: Skin is warm.   Nursing note and vitals reviewed.        Diagnostic Review  PET CT     Head/neck: There is physiologic FDG uptake within the visualized brain parenchyma.    Bilateral hypermetabolic intraparotid nodules or lymph nodes are identified measuring 1.5 cm with SUV max of 8.9 on the right and 1.1 cm with SUV max of 2.9 on the left.    Chest: There is an intensely hypermetabolic right perihilar lung mass extending into the superior segment of the right upper lobe.    Mass measures 5.6 cm with SUV max of 21.7.  There is contiguous tumor or lymphadenopathy in the right hilum with central bronchovascular encasement.  SUV max is 16.6.    FDG avid subcarinal lymphadenopathy is noted with SUV max of 5.2.    Additional small mildly FDG avid nodes present in the right paratracheal space with SUV max of 2.3.    There are normal size and borderline enlarged right axillary and subpectoral lymph nodes with SUV max of 3.6.    An ill-defined 1.4 cm nodular opacity is identified in mid right lung abutting the minor fissure which exhibits no " hypermetabolism.    Similar non-FDG avid 1.3 cm nodular opacity in the posterior left upper lobe abutting the major fissure.  No pleural effusion.    Abdomen/pelvis: The there is physiologic FDG uptake within the bowel and urinary tract.    No hypermetabolic lesions identified in the liver, spleen, pancreas, or adrenal glands.    There is no hypermetabolic retroperitoneal or mesenteric lymphadenopathy       Path   2. FNA STA 7 Lymph Node 25.1mm (EUS):  Positive for malignant cells suggestive of squamous cell carcinoma; see note.  Note: This sampling contains hyperchromatic, pleomorphic squamous epithelial cells in a sparse background of  lymphocytes consistent with squamous cell carcinoma.  3. FNA 4R Lymph Node 7.7mm (EUS):  Blood and lymphocytes;  Negative for malignant cells.  Assessment:      Problem List Items Addressed This Visit     Squamous cell carcinoma of right lung - Primary (Chronic)     +ve station 7 LN         Tobacco abuse    Endobronchial mass      Other Visit Diagnoses    None.        Plan:       Return if symptoms worsen or fail to improve.    This note was prepared using voice recognition system and is likely to have sound alike errors that may have been overlooked even after proof reading.  Please call me with any questions     TIME SPENT WITH PATIENT:     Time spent: 20 minutes in face to face  discussion concerning diagnosis, prognosis, review of lab and test results, benefits of treatment as well as management of disease, counseling of patient and coordination of care between various health  care providers . Greater than half the time spent was used for coordination of care and counseling of patient.     Zackery Peña    Pulmonary/Critical care/Sleepmedicine

## 2017-08-23 ENCOUNTER — INITIAL CONSULT (OUTPATIENT)
Dept: RADIATION ONCOLOGY | Facility: CLINIC | Age: 67
End: 2017-08-23
Payer: MEDICARE

## 2017-08-23 ENCOUNTER — HOSPITAL ENCOUNTER (OUTPATIENT)
Dept: RADIATION THERAPY | Facility: HOSPITAL | Age: 67
Discharge: HOME | End: 2017-08-23
Attending: INTERNAL MEDICINE
Payer: MEDICARE

## 2017-08-23 ENCOUNTER — HOSPITAL ENCOUNTER (OUTPATIENT)
Dept: RADIOLOGY | Facility: HOSPITAL | Age: 67
Discharge: HOME | End: 2017-08-23
Attending: INTERNAL MEDICINE
Payer: MEDICARE

## 2017-08-23 ENCOUNTER — INITIAL CONSULT (OUTPATIENT)
Dept: HEMATOLOGY/ONCOLOGY | Facility: CLINIC | Age: 67
End: 2017-08-23
Payer: MEDICARE

## 2017-08-23 VITALS
WEIGHT: 193.31 LBS | TEMPERATURE: 97 F | DIASTOLIC BLOOD PRESSURE: 62 MMHG | HEART RATE: 63 BPM | RESPIRATION RATE: 18 BRPM | HEIGHT: 70 IN | DIASTOLIC BLOOD PRESSURE: 62 MMHG | HEART RATE: 63 BPM | SYSTOLIC BLOOD PRESSURE: 135 MMHG | BODY MASS INDEX: 27.67 KG/M2 | SYSTOLIC BLOOD PRESSURE: 135 MMHG | OXYGEN SATURATION: 99 % | HEIGHT: 70 IN | BODY MASS INDEX: 27.67 KG/M2 | WEIGHT: 193.31 LBS | TEMPERATURE: 97 F

## 2017-08-23 DIAGNOSIS — C34.91 SQUAMOUS CELL CARCINOMA OF RIGHT LUNG: Primary | Chronic | ICD-10-CM

## 2017-08-23 DIAGNOSIS — R53.0 NEOPLASTIC MALIGNANT RELATED FATIGUE: ICD-10-CM

## 2017-08-23 DIAGNOSIS — Z11.4 ENCOUNTER FOR SCREENING FOR HIV: ICD-10-CM

## 2017-08-23 DIAGNOSIS — C77.1 CANCER OF INTRATHORACIC LYMPH NODES, SECONDARY: ICD-10-CM

## 2017-08-23 PROCEDURE — 3075F SYST BP GE 130 - 139MM HG: CPT | Mod: S$GLB,,, | Performed by: RADIOLOGY

## 2017-08-23 PROCEDURE — 99999 PR PBB SHADOW E&M-EST. PATIENT-LVL III: CPT | Mod: PBBFAC,,, | Performed by: RADIOLOGY

## 2017-08-23 PROCEDURE — 77290 THER RAD SIMULAJ FIELD CPLX: CPT | Mod: 26,,, | Performed by: RADIOLOGY

## 2017-08-23 PROCEDURE — 77263 THER RADIOLOGY TX PLNG CPLX: CPT | Mod: ,,, | Performed by: RADIOLOGY

## 2017-08-23 PROCEDURE — 99499 UNLISTED E&M SERVICE: CPT | Mod: S$GLB,,, | Performed by: INTERNAL MEDICINE

## 2017-08-23 PROCEDURE — 3075F SYST BP GE 130 - 139MM HG: CPT | Mod: S$GLB,,, | Performed by: INTERNAL MEDICINE

## 2017-08-23 PROCEDURE — 3008F BODY MASS INDEX DOCD: CPT | Mod: S$GLB,,, | Performed by: INTERNAL MEDICINE

## 2017-08-23 PROCEDURE — 99499 UNLISTED E&M SERVICE: CPT | Mod: S$GLB,,, | Performed by: RADIOLOGY

## 2017-08-23 PROCEDURE — 3008F BODY MASS INDEX DOCD: CPT | Mod: S$GLB,,, | Performed by: RADIOLOGY

## 2017-08-23 PROCEDURE — 3078F DIAST BP <80 MM HG: CPT | Mod: S$GLB,,, | Performed by: RADIOLOGY

## 2017-08-23 PROCEDURE — 77334 RADIATION TREATMENT AID(S): CPT | Mod: TC | Performed by: RADIOLOGY

## 2017-08-23 PROCEDURE — 3078F DIAST BP <80 MM HG: CPT | Mod: S$GLB,,, | Performed by: INTERNAL MEDICINE

## 2017-08-23 PROCEDURE — 99204 OFFICE O/P NEW MOD 45 MIN: CPT | Mod: S$GLB,,, | Performed by: INTERNAL MEDICINE

## 2017-08-23 PROCEDURE — 77014 HC CT GUIDANCE RADIATION THERAPY FLDS PLACEMENT: CPT | Mod: TC | Performed by: RADIOLOGY

## 2017-08-23 PROCEDURE — 77290 THER RAD SIMULAJ FIELD CPLX: CPT | Mod: TC | Performed by: RADIOLOGY

## 2017-08-23 PROCEDURE — 77334 RADIATION TREATMENT AID(S): CPT | Mod: 26,,, | Performed by: RADIOLOGY

## 2017-08-23 PROCEDURE — 99999 PR PBB SHADOW E&M-EST. PATIENT-LVL III: CPT | Mod: PBBFAC,,, | Performed by: INTERNAL MEDICINE

## 2017-08-23 PROCEDURE — 99204 OFFICE O/P NEW MOD 45 MIN: CPT | Mod: S$GLB,,, | Performed by: RADIOLOGY

## 2017-08-23 NOTE — LETTER
August 23, 2017      Zackery Peña MD  [address]  [facility] [address]           [place] - Hematology Oncology  [address] [place] [id number]  Phone: [phone]  Fax: [phone]          Patient: Jesse Chow   MR Number: [id number]   YOB: 1950   Date of Visit: 8/23/2017       Dear Dr. Zackery Peña:    Thank you for referring Jesse Chow to me for evaluation. Attached you will find relevant portions of my assessment and plan of care.    If you have questions, please do not hesitate to call me. I look forward to following Jesse Chow along with you.    Sincerely,    Burt De Luna MD    Enclosure  CC:  No Recipients    If you would like to receive this communication electronically, please contact [email or web address] or [phone] to request more information on [employer] Link access.    For providers and/or their staff who would like to refer a patient to [facility], please contact us through our one-stop-shop provider referral line, [facility] , at [phone].    If you feel you have received this communication in error or would no longer like to receive these types of communications, please e-mail [email or web address]

## 2017-08-23 NOTE — PROGRESS NOTES
Provider requesting consultation: Dr. Zackery Peña with pulmonary medicine  Reason for Consult: Squamous cell carcinoma of the right lung  Date of Diagnosis: August 22, 2017    HPI:   The patient is a 66-year-old -American male who presents to the hematology oncology clinic today in consultation to discuss further evaluation and management recommendations for squamous cell carcinoma of the right lung.  The patient has been referred to me by Dr. Zackery Peña with pulmonary medicine.  I have reviewed all of the patient's clinical history available in the medical record and have utilized this in my evaluation and management recommendations today.  Today the patient reports that he has a chronic cough with clear sputum.  He has had this for about 3-4 months.  He denies any fevers, chills or night sweats.  He denies any loss of appetite or unintentional weight loss.  He denies any melena, hemoptysis, hematemesis, hematuria or hematochezia.  He denies any bowel or urinary complaints.  He denies any chest pain.  He does report dyspnea with exertion.  He reports occasional headaches.    PAST MEDICAL HISTORY:   1.  Hypertension  2.  Dyslipidemia  3.  Bell's palsy  4.  Osteoarthritis/degenerative disc disease  5.  BPH  6.  Hearing impaired  7.  Vitamin D deficiency  8.  Depression    SURGICAL HISTORY:   1.  Hemorrhoidectomy  2.  Right neck mass excision which was benign    FAMILY HISTORY: He denies any immediate family members with cancer or bleeding/clotting disorders.    SOCIAL HISTORY: He reports of 40-pack-year smoking history and quit in August 2017.  He reports taking alcohol socially.  He has never used any recreational drugs.  He is medically disabled.  He is .    ALLERGIES: [NKDA]    MEDICATIONS: [Medcard has been reviewed and/or reconciled.]    REVIEW OF SYSTEMS:   GENERAL: [No fevers, chills or sweats. No fatigue, weight loss or loss of appetite.]  HEENT: [No blurred vision, tinnitus,  nasal discharge, sorethroat or dysphagia.]  HEART: [No chest pain, palpitations or shortness of breath.]    LUNGS: [Reports cough. Denies hemoptysis or breathing problems.]  ABDOMEN: [No abdominal pain, nausea, vomiting, diarrhea, constipation or melena.]  GENITOURINARY: [No dysuria, bleeding or malodorous discharge.]  NEURO: [No headache, dizziness or vertigo.]  HEMATOLOGY: [No easy bruising, spontaneous bleeding or blood clots in the past].  MUSCULOSKELETAL: [No arthralgias, myalgias or bone pains.]  SKIN: [No rashes or skin lesions.]  PSYCHIATRY: [No depression or anxiety.]    PHYSICAL EXAMINATION:   VS: Reviewed on nurse's notes.  APPEARANCE: The patient is a well-developed, well-nourished and well-groomed -American male who appears in no acute distress.  He was accompanied to this clinic visit by his wife.  HEENT: No scleral icterus. Both external auditory canals clear. No oral ulcers, lesions. Throat clear  HEAD: No sinus tenderness.  NECK: Supple. No palpable lymphadenopathy. Thyroid non-tender, no palpable masses  CHEST: Scattered rales.  Scattered wheezes.  No rhonchi. Unlabored respirations.  CARDIOVASCULAR: Normal S1, S2. Normal rate. Regular rhythm.  ABDOMEN: Bowel sounds normal. No tenderness. No abdominal distention. No hepatomegaly. No splenomegaly.  LYMPHATIC: No palpable supraclavicular, axillary nodes  EXTREMITIES: No clubbing, cyanosis, edema  SKIN: No lesions. No petechiae. No ecchymoses. No induration or nodules  NEUROLOGIC: No focal findings. Alert & Oriented x 3. Mood appropriate to affect    LABS:   Reviewed    IMAGING:  Reviewed    IMPRESSION:  1.  Squamous cell carcinoma of the right lung    PLAN:  1.  I had a detailed discussion with the patient today with regard to the diagnosis, prognosis and treatment for his newly diagnosed squamous cell carcinoma of the right lung.  2.  I will complete staging evaluation with MRI of the brain.  I will check lab work today as well.  3.   Radiation oncology consultation to discuss initiation of XRT to symptomatic RIGHT hilar mass with narrowing of the RIGHT upper lobe bronchus and extension of soft tissue density to the david.  4.  I will plan on initiation of weekly carboplatin/Taxol with XRT provided MRI of the brain does not show any evidence of metastatic disease.  5.  General surgery consult to discuss Mediport placement for initiation of chemotherapy.    Follow-up after above to discuss further management.  He knows to call for any additional questions or new problems.    Burt De Luna MD

## 2017-08-23 NOTE — LETTER
August 23, 2017      Burt De Luna MD  [address]  [facility] [address]           [place] - Radiation Oncology  [address] [place] [address]  Phone: [phone]  Fax: [phone]          Patient: Jesse Chow   MR Number: [id number]   YOB: 1950   Date of Visit: 8/23/2017       Dear Dr. Burt De Luna:    Thank you for referring Jesse Chow to me for evaluation. Attached you will find relevant portions of my assessment and plan of care.    If you have questions, please do not hesitate to call me. I look forward to following Jesse Chow along with you.    Sincerely,    Petr Nath MD    Enclosure  CC:  No Recipients    If you would like to receive this communication electronically, please contact [email or web address] or [phone] to request more information on [employer] Link access.    For providers and/or their staff who would like to refer a patient to [facility], please contact us through our one-stop-shop provider referral line, [facility] , at [phone].    If you feel you have received this communication in error or would no longer like to receive these types of communications, please e-mail [email or web address]

## 2017-08-23 NOTE — PROGRESS NOTES
REFERRING PHYSICIAN: Burt De Luna MD    PROBLEM: Mr Chow is a 66 years old man presenting with a stage T3 N2 M0  (IIIa) squamous cell carcinoma of the right lower lobe of the lung.    OTHER MEDICAL HISTIORY: Patient quit smoking 8/13/17. He has had hemrrhoidectomy and excision of a cyst from the neck. He has had Bell 's palsy and is treated or followed for hypertension and hyperlipidemia. PS is ECOG 0.Psychosocial Distress screening score of Distress Score: 6 noted and reviewed. No intervention indicated.    PRESENT ILLNESS: A chest xray obtained on 5/30/17 because of cough and congestion shows a density in the right lower lobe. Follow up imaging with CT of the thorax on 7/18/17 and PET scan on 9/9/17 show a 5 cm hypermetabolic right lower lobe mass that extends into the right hilum and subcarinal area. There is active right paratracheal adenopathy and a hypermetabolic node in the right neck posterior to the right parotid at the level of C1.  There are several sub centimeter nodules in the lung that are not hypermetabolic and a right axillary node that is faintly hypermetabolicl.  There are faintly metabolic nodules in both parotid glands.     On 8/16/17 patient underwent EUS bronchoscopy. Biopsy of the subcarinal mass gives a diagnosis of squamous cell carcinoma. Lymph nodes at 4R and 4L were negative.     PHYSICAL EXAM: Patient is an alert man who responds appropriately with normal speech and voice. The respirations are normal. There is no cervical or supraclavicular lymphadenopathy. The extremities are without edema. He says he sees double sometimes but the extra ocular motions are full. There are no evident neurologic deficits.     RADIOLOGIC STUDIES: In addition to that noted above, MRI of the neck and lumbar spine on 6/6/17 shows only degenerative disease.     LABORATORY STUDIES: On 8/13/17 the Hb is 13.0 with a wbc of 10,280 and a platelet count of 249,000. The MCV is slightly decreased to 79.  Basic metabolic panel is normal.     IMPRESSION: The parenchymal lung nodules, the right axillary node and the intraglandular parotid nodules have SUV less than about 3 and their significance is doubtful.  The lymph node posterior to the right parotid has SUV of 8 and its significance is uncertain. In any event, treatment with radiation and concurrent chemotherapy is offered for its palliative value and because of the small but significant chance of curing the cancer.     PLAN: Patient underwent treatment planning simulation today. A course of radiation to a dose of about 60 Gy in fractions of 2 Gy each with concurrent chemotherapy is expected to begin in the week of 8/28. (45 minutes with patient at least half of which was  discussion)

## 2017-08-24 ENCOUNTER — TELEPHONE (OUTPATIENT)
Dept: HEMATOLOGY/ONCOLOGY | Facility: CLINIC | Age: 67
End: 2017-08-24

## 2017-08-24 NOTE — TELEPHONE ENCOUNTER
----- Message from Burt De Luna MD sent at 8/23/2017  5:40 PM CDT -----  Please schedule patient for surgery consult to discuss Mediport placement for chemotherapy.  Thank you.

## 2017-08-25 ENCOUNTER — HOSPITAL ENCOUNTER (OUTPATIENT)
Dept: RADIOLOGY | Facility: HOSPITAL | Age: 67
Discharge: HOME | End: 2017-08-25
Attending: INTERNAL MEDICINE
Payer: MEDICARE

## 2017-08-25 DIAGNOSIS — C34.91 SQUAMOUS CELL CARCINOMA OF RIGHT LUNG: Chronic | ICD-10-CM

## 2017-08-25 PROCEDURE — A9585 GADOBUTROL INJECTION: HCPCS | Performed by: INTERNAL MEDICINE

## 2017-08-25 PROCEDURE — 70553 MRI BRAIN STEM W/O & W/DYE: CPT | Mod: TC

## 2017-08-25 PROCEDURE — 25500020 PHARM REV CODE 255: Performed by: INTERNAL MEDICINE

## 2017-08-25 RX ORDER — GADOBUTROL 604.72 MG/ML
8 INJECTION INTRAVENOUS
Status: COMPLETED | OUTPATIENT
Start: 2017-08-25 | End: 2017-08-25

## 2017-08-25 RX ADMIN — GADOBUTROL 8 ML: 604.72 INJECTION INTRAVENOUS at 03:08

## 2017-08-28 ENCOUNTER — TELEPHONE (OUTPATIENT)
Dept: RADIATION ONCOLOGY | Facility: CLINIC | Age: 67
End: 2017-08-28

## 2017-08-28 ENCOUNTER — TELEPHONE (OUTPATIENT)
Dept: HEMATOLOGY/ONCOLOGY | Facility: CLINIC | Age: 67
End: 2017-08-28

## 2017-08-28 PROCEDURE — 77295 3-D RADIOTHERAPY PLAN: CPT | Mod: 26,,, | Performed by: RADIOLOGY

## 2017-08-28 PROCEDURE — 77334 RADIATION TREATMENT AID(S): CPT | Mod: 26,,, | Performed by: RADIOLOGY

## 2017-08-28 PROCEDURE — 77295 3-D RADIOTHERAPY PLAN: CPT | Mod: TC | Performed by: RADIOLOGY

## 2017-08-28 PROCEDURE — 77300 RADIATION THERAPY DOSE PLAN: CPT | Mod: 26,,, | Performed by: RADIOLOGY

## 2017-08-28 PROCEDURE — 77334 RADIATION TREATMENT AID(S): CPT | Mod: TC | Performed by: RADIOLOGY

## 2017-08-28 PROCEDURE — 77300 RADIATION THERAPY DOSE PLAN: CPT | Mod: TC | Performed by: RADIOLOGY

## 2017-08-28 NOTE — TELEPHONE ENCOUNTER
Called to remind pt of Radiation treatment tomorrow.  S/w wife who stated that the patient has decided not to go forward with treatment.  Told her would relay message on to providers and to call us with any questions/concerns in meantime.  Appt with Dr. De Luna scheduled tomorrow.

## 2017-08-28 NOTE — TELEPHONE ENCOUNTER
----- Message from Burt De Luna MD sent at 8/28/2017  8:08 AM CDT -----  Please let the patient know that MRI of the brain does not show any evidence of cancer in the brain which is good news.  Thank you.  I will discuss in detail at follow up tomorrow.  Thank you.

## 2017-08-31 ENCOUNTER — OFFICE VISIT (OUTPATIENT)
Dept: HEMATOLOGY/ONCOLOGY | Facility: CLINIC | Age: 67
End: 2017-08-31
Payer: MEDICARE

## 2017-08-31 VITALS
HEART RATE: 76 BPM | TEMPERATURE: 99 F | HEIGHT: 70 IN | DIASTOLIC BLOOD PRESSURE: 65 MMHG | WEIGHT: 190.69 LBS | SYSTOLIC BLOOD PRESSURE: 135 MMHG | BODY MASS INDEX: 27.3 KG/M2

## 2017-08-31 DIAGNOSIS — R91.8 ENDOBRONCHIAL MASS: ICD-10-CM

## 2017-08-31 DIAGNOSIS — C34.91 SQUAMOUS CELL CARCINOMA OF RIGHT LUNG: Primary | Chronic | ICD-10-CM

## 2017-08-31 PROCEDURE — 1159F MED LIST DOCD IN RCRD: CPT | Mod: S$GLB,,, | Performed by: INTERNAL MEDICINE

## 2017-08-31 PROCEDURE — 3078F DIAST BP <80 MM HG: CPT | Mod: S$GLB,,, | Performed by: INTERNAL MEDICINE

## 2017-08-31 PROCEDURE — 3008F BODY MASS INDEX DOCD: CPT | Mod: S$GLB,,, | Performed by: INTERNAL MEDICINE

## 2017-08-31 PROCEDURE — 99215 OFFICE O/P EST HI 40 MIN: CPT | Mod: S$GLB,,, | Performed by: INTERNAL MEDICINE

## 2017-08-31 PROCEDURE — 3075F SYST BP GE 130 - 139MM HG: CPT | Mod: S$GLB,,, | Performed by: INTERNAL MEDICINE

## 2017-08-31 PROCEDURE — 99999 PR PBB SHADOW E&M-EST. PATIENT-LVL III: CPT | Mod: PBBFAC,,, | Performed by: INTERNAL MEDICINE

## 2017-08-31 PROCEDURE — 99499 UNLISTED E&M SERVICE: CPT | Mod: S$GLB,,, | Performed by: INTERNAL MEDICINE

## 2017-08-31 PROCEDURE — 1126F AMNT PAIN NOTED NONE PRSNT: CPT | Mod: S$GLB,,, | Performed by: INTERNAL MEDICINE

## 2017-08-31 NOTE — PROGRESS NOTES
Reason for visit: Squamous cell carcinoma of the right lung  Date of Diagnosis: August 22, 2017    HPI:   The patient is a 67-year-old -American male who presents to the hematology oncology clinic today to discuss further evaluation and management recommendations for squamous cell carcinoma of the right lung.  The patient has been referred to me by Dr. Zackery Peña with pulmonary medicine.  I have reviewed all of the patient's clinical history available in the medical record and have utilized this in my evaluation and management recommendations today.  Today the patient reports that he has a chronic cough with clear sputum.  He has had this for about 3-4 months.  He denies any fevers, chills or night sweats.  He denies any loss of appetite or unintentional weight loss.  He denies any melena, hemoptysis, hematemesis, hematuria or hematochezia.  He denies any bowel or urinary complaints.  He denies any chest pain.  He does report dyspnea with exertion.  He reports occasional headaches.    PAST MEDICAL HISTORY:   1.  Hypertension  2.  Dyslipidemia  3.  Bell's palsy  4.  Osteoarthritis/degenerative disc disease  5.  BPH  6.  Hearing impaired  7.  Vitamin D deficiency  8.  Depression    SURGICAL HISTORY:   1.  Hemorrhoidectomy  2.  Right neck mass excision which was benign    FAMILY HISTORY: He denies any immediate family members with cancer or bleeding/clotting disorders.    SOCIAL HISTORY: He reports of 40-pack-year smoking history and quit in August 2017.  He reports taking alcohol socially.  He has never used any recreational drugs.  He is medically disabled.  He is .    ALLERGIES: [NKDA]    MEDICATIONS: [Medcard has been reviewed and/or reconciled.]    REVIEW OF SYSTEMS:   GENERAL: [No fevers, chills or sweats. No fatigue, weight loss or loss of appetite.]  HEENT: [No blurred vision, tinnitus, nasal discharge, sorethroat or dysphagia.]  HEART: [No chest pain, palpitations or shortness of breath.]     LUNGS: [Reports cough. Denies hemoptysis or breathing problems.]  ABDOMEN: [No abdominal pain, nausea, vomiting, diarrhea, constipation or melena.]  GENITOURINARY: [No dysuria, bleeding or malodorous discharge.]  NEURO: [No headache, dizziness or vertigo.]  HEMATOLOGY: [No easy bruising, spontaneous bleeding or blood clots in the past].  MUSCULOSKELETAL: [No arthralgias, myalgias or bone pains.]  SKIN: [No rashes or skin lesions.]  PSYCHIATRY: [No depression. Reports anxiety.]    PHYSICAL EXAMINATION:   VS: Reviewed on nurse's notes.  APPEARANCE: The patient is a well-developed, well-nourished and well-groomed -American male who appears in no acute distress.  He was accompanied to this clinic visit by his wife.  The remainder of the patient's physical exam was deferred today.    LABS:   Reviewed    IMAGING:  Reviewed    IMPRESSION:  1.  Squamous cell carcinoma of the right lung    PLAN:  1.  I had a detailed discussion with the patient today with regard to the diagnosis, prognosis and treatment for his newly diagnosed squamous cell carcinoma of the right lung.  2.  MRI of the brain done on August 25, 2017 does not show any evidence of metastatic disease .  I reviewed the results of his recent lab work with him in detail as well.  He expressed understanding.  3.  Radiation oncology consultation to discuss initiation of XRT to symptomatic RIGHT hilar mass with narrowing of the RIGHT upper lobe bronchus and extension of soft tissue density to the david.  He was scheduled to start XRT earlier this week but canceled this.  4. My plan was initiation of weekly carboplatin/Taxol with XRT provided MRI of the brain does not show any evidence of metastatic disease.  5.  I had an extensive discussion with the patient and his wife today with regard to why he canceled his appointments including with radiation oncology and Gen. surgery.  He stated that he feels overwhelmed with all of his recent clinical events  including the diagnosis of his malignancy and recommended treatment options.  We spent a lot of time today reviewing his diagnosis and treatment options in detail today including the option of no treatment with risks/benefits of all of these options.  I have again given the patient an opportunity today to ask all questions that he had and have answered them to his satisfaction.  At this time he appears to be leaning towards starting treatment.  He will think some more about all of this and stated that he will call me tomorrow with his answer.  I will await is phone call and schedule all appointments as necessary.    Follow-up after above to discuss further management.  He knows to call for any additional questions or new problems.    I spent with him and 40 minutes face-to-face with the patient discussing and reviewing all of the above in detail with greater than 50% of this time spent in counseling.    Burt De Luna MD

## 2017-09-01 ENCOUNTER — TELEPHONE (OUTPATIENT)
Dept: HEMATOLOGY/ONCOLOGY | Facility: CLINIC | Age: 67
End: 2017-09-01

## 2017-09-01 NOTE — TELEPHONE ENCOUNTER
"Patient's wife states "my  would like to start chemotherapy".  Advised patients wife that Dr. De Luan's office will contact them with a follow up appointment.  "

## 2017-09-01 NOTE — TELEPHONE ENCOUNTER
----- Message from Meche Amaral sent at 9/1/2017  3:54 PM CDT -----  Contact: wife Elsie  Calling concerning patient starting treatment. Please call wife ASAP @ [phone]. Thanks, musa

## 2017-09-05 ENCOUNTER — OFFICE VISIT (OUTPATIENT)
Dept: SURGERY | Facility: CLINIC | Age: 67
End: 2017-09-05
Payer: MEDICARE

## 2017-09-05 ENCOUNTER — OFFICE VISIT (OUTPATIENT)
Dept: HEMATOLOGY/ONCOLOGY | Facility: CLINIC | Age: 67
End: 2017-09-05
Payer: MEDICARE

## 2017-09-05 VITALS
SYSTOLIC BLOOD PRESSURE: 128 MMHG | DIASTOLIC BLOOD PRESSURE: 76 MMHG | WEIGHT: 191.56 LBS | TEMPERATURE: 99 F | HEART RATE: 79 BPM | BODY MASS INDEX: 27.42 KG/M2 | HEIGHT: 70 IN

## 2017-09-05 VITALS
WEIGHT: 191.56 LBS | SYSTOLIC BLOOD PRESSURE: 127 MMHG | DIASTOLIC BLOOD PRESSURE: 76 MMHG | BODY MASS INDEX: 27.49 KG/M2 | TEMPERATURE: 98 F | HEART RATE: 67 BPM

## 2017-09-05 DIAGNOSIS — R91.8 ENDOBRONCHIAL MASS: ICD-10-CM

## 2017-09-05 DIAGNOSIS — C34.91 SQUAMOUS CELL CARCINOMA OF RIGHT LUNG: Primary | Chronic | ICD-10-CM

## 2017-09-05 DIAGNOSIS — C34.91 SQUAMOUS CELL CARCINOMA LUNG, RIGHT: Primary | ICD-10-CM

## 2017-09-05 PROCEDURE — 99999 PR PBB SHADOW E&M-EST. PATIENT-LVL III: CPT | Mod: PBBFAC,,, | Performed by: INTERNAL MEDICINE

## 2017-09-05 PROCEDURE — 99499 UNLISTED E&M SERVICE: CPT | Mod: S$GLB,,, | Performed by: SURGERY

## 2017-09-05 PROCEDURE — 99999 PR PBB SHADOW E&M-EST. PATIENT-LVL IV: CPT | Mod: PBBFAC,,, | Performed by: SURGERY

## 2017-09-05 PROCEDURE — 1159F MED LIST DOCD IN RCRD: CPT | Mod: S$GLB,,, | Performed by: INTERNAL MEDICINE

## 2017-09-05 PROCEDURE — 99215 OFFICE O/P EST HI 40 MIN: CPT | Mod: S$GLB,,, | Performed by: INTERNAL MEDICINE

## 2017-09-05 PROCEDURE — 1126F AMNT PAIN NOTED NONE PRSNT: CPT | Mod: S$GLB,,, | Performed by: SURGERY

## 2017-09-05 PROCEDURE — 3074F SYST BP LT 130 MM HG: CPT | Mod: S$GLB,,, | Performed by: SURGERY

## 2017-09-05 PROCEDURE — 99499 UNLISTED E&M SERVICE: CPT | Mod: S$GLB,,, | Performed by: INTERNAL MEDICINE

## 2017-09-05 PROCEDURE — 3008F BODY MASS INDEX DOCD: CPT | Mod: S$GLB,,, | Performed by: SURGERY

## 2017-09-05 PROCEDURE — 3008F BODY MASS INDEX DOCD: CPT | Mod: S$GLB,,, | Performed by: INTERNAL MEDICINE

## 2017-09-05 PROCEDURE — 99204 OFFICE O/P NEW MOD 45 MIN: CPT | Mod: S$GLB,,, | Performed by: SURGERY

## 2017-09-05 PROCEDURE — 3078F DIAST BP <80 MM HG: CPT | Mod: S$GLB,,, | Performed by: SURGERY

## 2017-09-05 PROCEDURE — 1159F MED LIST DOCD IN RCRD: CPT | Mod: S$GLB,,, | Performed by: SURGERY

## 2017-09-05 PROCEDURE — 3078F DIAST BP <80 MM HG: CPT | Mod: S$GLB,,, | Performed by: INTERNAL MEDICINE

## 2017-09-05 PROCEDURE — 3074F SYST BP LT 130 MM HG: CPT | Mod: S$GLB,,, | Performed by: INTERNAL MEDICINE

## 2017-09-05 PROCEDURE — 1126F AMNT PAIN NOTED NONE PRSNT: CPT | Mod: S$GLB,,, | Performed by: INTERNAL MEDICINE

## 2017-09-05 RX ORDER — PROCHLORPERAZINE MALEATE 5 MG
5 TABLET ORAL EVERY 6 HOURS PRN
Qty: 30 TABLET | Refills: 1 | Status: SHIPPED | OUTPATIENT
Start: 2017-09-05 | End: 2018-01-11

## 2017-09-05 RX ORDER — SODIUM CHLORIDE 9 MG/ML
INJECTION, SOLUTION INTRAVENOUS CONTINUOUS
Status: CANCELLED | OUTPATIENT
Start: 2017-09-05

## 2017-09-05 RX ORDER — DEXAMETHASONE 4 MG/1
TABLET ORAL
Qty: 20 TABLET | Refills: 0 | Status: SHIPPED | OUTPATIENT
Start: 2017-09-05 | End: 2017-10-06 | Stop reason: ALTCHOICE

## 2017-09-05 RX ORDER — ONDANSETRON 8 MG/1
8 TABLET, ORALLY DISINTEGRATING ORAL EVERY 12 HOURS PRN
Qty: 30 TABLET | Refills: 1 | Status: SHIPPED | OUTPATIENT
Start: 2017-09-05 | End: 2018-01-11

## 2017-09-05 NOTE — LETTER
September 5, 2017      Burt De Luna MD  [address] [place] [address]           [facility]  [address] [place] [address]  Phone: [phone]  Fax: [phone]          Patient: Jesse Chow   MR Number: [id number]   YOB: 1950   Date of Visit: 9/5/2017       Dear Dr. Burt De Luna:    Thank you for referring Jesse Chow to me for evaluation. Attached you will find relevant portions of my assessment and plan of care.    If you have questions, please do not hesitate to call me. I look forward to following Jesse Chow along with you.    Sincerely,    Farooq Gray MD    Enclosure  CC:  No Recipients    If you would like to receive this communication electronically, please contact [email or web address] or [phone] to request more information on [facility] [employer] [facility] access.    For providers and/or their staff who would like to refer a patient to [facility], please contact us through our one-stop-shop provider referral line, [facility], at [phone].    If you feel you have received this communication in error or would no longer like to receive these types of communications, please e-mail [email or web address]

## 2017-09-11 ENCOUNTER — OFFICE VISIT (OUTPATIENT)
Dept: SURGERY | Facility: CLINIC | Age: 67
End: 2017-09-11
Payer: MEDICARE

## 2017-09-11 DIAGNOSIS — Z55.9 EDUCATIONAL CIRCUMSTANCE: ICD-10-CM

## 2017-09-11 DIAGNOSIS — Z71.89 ENCOUNTER FOR MEDICATION COUNSELING: ICD-10-CM

## 2017-09-11 DIAGNOSIS — C34.91 SQUAMOUS CELL CARCINOMA OF RIGHT LUNG: Primary | Chronic | ICD-10-CM

## 2017-09-11 DIAGNOSIS — Z79.899 HIGH RISK MEDICATION USE: ICD-10-CM

## 2017-09-11 PROCEDURE — 3008F BODY MASS INDEX DOCD: CPT | Mod: S$GLB,,, | Performed by: NURSE PRACTITIONER

## 2017-09-11 PROCEDURE — 1159F MED LIST DOCD IN RCRD: CPT | Mod: S$GLB,,, | Performed by: NURSE PRACTITIONER

## 2017-09-11 PROCEDURE — 99215 OFFICE O/P EST HI 40 MIN: CPT | Mod: S$GLB,,, | Performed by: NURSE PRACTITIONER

## 2017-09-11 PROCEDURE — 99499 UNLISTED E&M SERVICE: CPT | Mod: S$GLB,,, | Performed by: NURSE PRACTITIONER

## 2017-09-11 PROCEDURE — 99999 PR PBB SHADOW E&M-EST. PATIENT-LVL II: CPT | Mod: PBBFAC,,, | Performed by: NURSE PRACTITIONER

## 2017-09-11 SDOH — SOCIAL DETERMINANTS OF HEALTH (SDOH): PROBLEMS RELATED TO EDUCATION AND LITERACY, UNSPECIFIED: Z55.9

## 2017-09-11 NOTE — PROGRESS NOTES
HEMATOLOGY/ONCOLOGY    ONCOLOGIST: HERVE De Luna MD    CC: Chemotherapy Teaching    DIAGNOSIS: Lung cancer    CHEMOTHERAPY:  Carboplatin/TAxol weekly with radiation    68 y/o male accompanied by his wife for chemotherapy teaching. Pt given the Navigation Notebook. Explained how to use notebook. Discussed with pt and family rationale for chemotherapy, how works, the process of treatment, potential side effects and symptoms to report.     Reviewed specific medications and gave them a handout describing the side effects of each medication. Stressed the importance of taking his dexamethasone 12 hours and 6 hours prior to Taxol treatment. Handout with date and times to take given to pt.  Side effects of dexamethasone discussed.     Pt oriented to the unit. Instructed can bring food and entertainment. Discussed role of family members and when they can visit.     Discussed potential side effects such as:  Nausea and vomiting  Myelosuppression  Fatigue  Anorexia  Alopecia  Stomatitis  Diarrhea  Cystitis  Gastritis  Fever > 100.0  Antiemetics instructions  Skin care  Constipation  Rash  Hyperpigmentation  Rash  Photosensitivity  Sunscreen  Small freq meals  Increased protein  Increased calories  Vitamin support   Taste alterations  Neuropathys  Hydration  Renal toxicity  Port management  Community resources  Thrombocytopenia precautions  Hand and foot syndrome- symptoms and self care tips  Importance of monitoring blood sugars if diabetic and reporting elevations or lows    Pt verbalized understanding of the information given.    Verbalizes understanding of contact information during and after clinic hours. Pt listened and asked appropriate questions.     Community and social resources brought to his attention. Time spent with pt and family was 60 minutes face to face with 100% of time spent educating and counseling. Please see further documentation in the pt education flow sheet.     Tanisha Moran, RN, MSN, NP-C

## 2017-09-11 NOTE — PROGRESS NOTES
Reason for visit: Squamous cell carcinoma of the right lung  Date of Diagnosis: August 22, 2017    HPI:   The patient is a 67-year-old -American male who presents to the hematology oncology clinic today to discuss further evaluation and management recommendations for squamous cell carcinoma of the right lung.  The patient has been referred to me by Dr. Zackery Peña with pulmonary medicine.  I have reviewed all of the patient's clinical history available in the medical record and have utilized this in my evaluation and management recommendations today.  Today the patient reports that he has a chronic cough with clear sputum.  He has had this for about 3-4 months.  He denies any fevers, chills or night sweats.  He denies any loss of appetite or unintentional weight loss.  He denies any melena, hemoptysis, hematemesis, hematuria or hematochezia.  He denies any bowel or urinary complaints.  He denies any chest pain.  He does report dyspnea with exertion.  He reports occasional headaches.    PAST MEDICAL HISTORY:   1.  Hypertension  2.  Dyslipidemia  3.  Bell's palsy  4.  Osteoarthritis/degenerative disc disease  5.  BPH  6.  Hearing impaired  7.  Vitamin D deficiency  8.  Depression    SURGICAL HISTORY:   1.  Hemorrhoidectomy  2.  Right neck mass excision which was benign    FAMILY HISTORY: He denies any immediate family members with cancer or bleeding/clotting disorders.    SOCIAL HISTORY: He reports of 40-pack-year smoking history and quit in August 2017.  He reports taking alcohol socially.  He has never used any recreational drugs.  He is medically disabled.  He is .    ALLERGIES: [NKDA]    MEDICATIONS: [Medcard has been reviewed and/or reconciled.]    REVIEW OF SYSTEMS:   GENERAL: [No fevers, chills or sweats. No fatigue, weight loss or loss of appetite.]  HEENT: [No blurred vision, tinnitus, nasal discharge, sorethroat or dysphagia.]  HEART: [No chest pain, palpitations or shortness of breath.]     LUNGS: [Reports cough. Denies hemoptysis or breathing problems.]  ABDOMEN: [No abdominal pain, nausea, vomiting, diarrhea, constipation or melena.]  GENITOURINARY: [No dysuria, bleeding or malodorous discharge.]  NEURO: [No headache, dizziness or vertigo.]  HEMATOLOGY: [No easy bruising, spontaneous bleeding or blood clots in the past].  MUSCULOSKELETAL: [No arthralgias, myalgias or bone pains.]  SKIN: [No rashes or skin lesions.]  PSYCHIATRY: [No depression. Reports anxiety.]    PHYSICAL EXAMINATION:   VS: Reviewed on nurse's notes.  APPEARANCE: The patient is a well-developed, well-nourished and well-groomed -American male who appears in no acute distress.  He was accompanied to this clinic visit by his wife.  HEENT: No scleral icterus. Both external auditory canals clear. No oral ulcers, lesions. Throat clear  HEAD: No sinus tenderness.  NECK: Supple. No palpable lymphadenopathy. Thyroid non-tender, no palpable masses  CHEST: Scattered rales.  Scattered wheezes.  No rhonchi. Unlabored respirations.  CARDIOVASCULAR: Normal S1, S2. Normal rate. Regular rhythm.  ABDOMEN: Bowel sounds normal. No tenderness. No abdominal distention. No hepatomegaly. No splenomegaly.  LYMPHATIC: No palpable supraclavicular, axillary nodes  EXTREMITIES: No clubbing, cyanosis, edema  SKIN: No lesions. No petechiae. No ecchymoses. No induration or nodules  NEUROLOGIC: No focal findings. Alert & Oriented x 3. Mood appropriate to affect    LABS:   Reviewed    IMAGING:  Reviewed    IMPRESSION:  1.  Squamous cell carcinoma of the right lung    PLAN:  1.  I had a detailed discussion with the patient today with regard to the diagnosis, prognosis and treatment for his newly diagnosed squamous cell carcinoma of the right lung.  2.  MRI of the brain done on August 25, 2017 does not show any evidence of metastatic disease .  I reviewed the results of his recent lab work with him in detail as well.  He expressed understanding.  3.   Radiation oncology consultation to discuss initiation of XRT to symptomatic RIGHT hilar mass with narrowing of the RIGHT upper lobe bronchus and extension of soft tissue density to the david.  He is now agreeable to proceed with XRT and this appointment will be rescheduled.   4. My plan was initiation of weekly carboplatin/Taxol with XRT provided MRI of the brain does not show any evidence of metastatic disease.  5.  The patient stated today that he would like to proceed with weekly carboplatin/Taxol with XRT.  I will reschedule surgery consultation for MediPort placement for administration of chemotherapy.  Printed information about carboplatin/Taxol was given to the patient today.  All necessary prescriptions were sent to the patient's pharmacy today.  6.  He will be scheduled for chemotherapy teaching with Ms. Moran.    Follow-up as above with labs for cycle 1 week 1 of carboplatin/Taxol plus XRT.  He knows to call sooner if any new problems or questions.      I spent greater than 40 minutes face-to-face with the patient and his wife discussing and reviewing all of the above in detail and in care coordination with greater than 50% of this time spent in counseling.    Burt De Luna MD

## 2017-09-14 NOTE — PRE-PROCEDURE INSTRUCTIONS
Pre op instructions reviewed with patient per phone:    To confirm, Your surgeon has instructed you:  Surgery is scheduled 9/18/17 at 1130.      Please report to [facility] 1st floor main lobby by 1000 Pre admit office will call afternoon prior to surgery for final arrival time.      INSTRUCTIONS IMPORTANT!!!  ¨ No smoking after 12 midnight, the night before surgery.  ¨ No solid food after 12 midnight, but you may have clear liquids up until 3 hours prior to surgery.  This includes: grape, cranberry, and apple juice (not orange, and no coffee.)   ¨ OK to brush teeth, but no gum, candy or mints!    ¨ Take only these medicines with a small swallow of water-morning of surgery.  Lotrel    ____  Do not wear makeup, including mascara.  ____  No powder, lotions or creams to surgical area.  ____  Please remove all jewelry, including piercings and leave at home.  ____  No money or valuables needed. Please leave at home.  ____  Please bring identification and insurance information to hospital.  ____  If going home the same day, arrange for a ride home. You will not be able to   drive if Anesthesia was used.  ____  Children, under 12 years old, must remain in the waiting room with an adult.  They are not allowed in patient areas.  ____  Wear loose fitting clothing. Allow for dressings, bandages.  ____  Stop Aspirin, Ibuprofen, Motrin and Aleve at least 5-7 days before surgery, unless otherwise instructed by your doctor, or the nurse.   You MAY use Tylenol/acetaminophen until day of surgery.  ____  If you take diabetic medication, do not take am of surgery unless instructed by   Doctor.  ____ Stop taking any Fish Oil supplement or any Vitamins that contain Vitamin E at least 5 days prior to surgery.          Bathing Instructions-- The night before surgery and the morning prior to coming to the hospital:   -Do not shave the surgical area.   -Shower and wash your hair and body as usual with your regular  soap and shampoo.   -Rinse your hair and body completely.   -Use one packet of hibiclens to wash the surgical site (using your hand) gently for 5 minutes.  Do not scrub you skin too hard.   -Do not use hibiclens on your head, face, or genitals.   -Do not wash with regular soap after you use the hibiclens.   -Rinse your body thoroughly.   -Dry with clean, soft towel.  Do not use lotion, cream, deodorant, or powders on   the surgical site.    Use antibacterial soap in place of hibiclens if your surgery is on the head, face or genitals.         Surgical Site Infection    Prevention of surgical site infections:     -Keep incisions clean and dry.   -Do not soak/submerge incisions in water until completely healed.   -Do not apply lotions, powders, creams, or deodorants to site.   -Always make sure hands are cleaned with antibacterial soap/ alcohol-based   prior to touching the surgical site.  (This includes doctors, nurses, staff, and yourself.)    Signs and symptoms:   -Redness and pain around the area where you had surgery   -Drainage of cloudy fluid from your surgical wound   -Fever over 100.4  I have read or had read and explained to me, and understand the above information.

## 2017-09-18 ENCOUNTER — HOSPITAL ENCOUNTER (OUTPATIENT)
Facility: HOSPITAL | Age: 67
Discharge: HOME | End: 2017-09-18
Attending: SURGERY | Admitting: SURGERY
Payer: MEDICARE

## 2017-09-18 ENCOUNTER — ANESTHESIA (OUTPATIENT)
Dept: SURGERY | Facility: HOSPITAL | Age: 67
End: 2017-09-18
Payer: MEDICARE

## 2017-09-18 ENCOUNTER — ANESTHESIA EVENT (OUTPATIENT)
Dept: SURGERY | Facility: HOSPITAL | Age: 67
End: 2017-09-18
Payer: MEDICARE

## 2017-09-18 ENCOUNTER — SURGERY (OUTPATIENT)
Age: 67
End: 2017-09-18

## 2017-09-18 DIAGNOSIS — C34.91 SQUAMOUS CELL CARCINOMA LUNG, RIGHT: ICD-10-CM

## 2017-09-18 DIAGNOSIS — C34.90 SQUAMOUS CELL CARCINOMA LUNG, UNSPECIFIED LATERALITY: Primary | ICD-10-CM

## 2017-09-18 PROCEDURE — 63600175 PHARM REV CODE 636 W HCPCS: Performed by: NURSE ANESTHETIST, CERTIFIED REGISTERED

## 2017-09-18 PROCEDURE — 77001 FLUOROGUIDE FOR VEIN DEVICE: CPT | Mod: 26,,, | Performed by: SURGERY

## 2017-09-18 PROCEDURE — 37000009 HC ANESTHESIA EA ADD 15 MINS: Performed by: SURGERY

## 2017-09-18 PROCEDURE — 71000033 HC RECOVERY, INTIAL HOUR: Performed by: SURGERY

## 2017-09-18 PROCEDURE — 71000015 HC POSTOP RECOV 1ST HR: Performed by: SURGERY

## 2017-09-18 PROCEDURE — 25000003 PHARM REV CODE 250: Performed by: NURSE ANESTHETIST, CERTIFIED REGISTERED

## 2017-09-18 PROCEDURE — 63600175 PHARM REV CODE 636 W HCPCS: Performed by: SURGERY

## 2017-09-18 PROCEDURE — 25000003 PHARM REV CODE 250: Performed by: SURGERY

## 2017-09-18 PROCEDURE — 36561 INSERT TUNNELED CV CATH: CPT | Mod: ,,, | Performed by: SURGERY

## 2017-09-18 PROCEDURE — 36000706: Performed by: SURGERY

## 2017-09-18 PROCEDURE — 36000707: Performed by: SURGERY

## 2017-09-18 PROCEDURE — 37000008 HC ANESTHESIA 1ST 15 MINUTES: Performed by: SURGERY

## 2017-09-18 PROCEDURE — C1788 PORT, INDWELLING, IMP: HCPCS | Performed by: SURGERY

## 2017-09-18 DEVICE — PORT POWER CLEAR VIEW: Type: IMPLANTABLE DEVICE | Site: SUBCLAVIAN | Status: FUNCTIONAL

## 2017-09-18 RX ORDER — HYDROCODONE BITARTRATE AND ACETAMINOPHEN 5; 325 MG/1; MG/1
1 TABLET ORAL EVERY 4 HOURS PRN
Status: DISCONTINUED | OUTPATIENT
Start: 2017-09-18 | End: 2017-09-18 | Stop reason: HOSPADM

## 2017-09-18 RX ORDER — DIPHENHYDRAMINE HYDROCHLORIDE 50 MG/ML
25 INJECTION INTRAMUSCULAR; INTRAVENOUS EVERY 6 HOURS PRN
Status: DISCONTINUED | OUTPATIENT
Start: 2017-09-18 | End: 2017-09-18 | Stop reason: HOSPADM

## 2017-09-18 RX ORDER — OXYCODONE HYDROCHLORIDE 5 MG/1
5 TABLET ORAL
Status: DISCONTINUED | OUTPATIENT
Start: 2017-09-18 | End: 2017-09-18 | Stop reason: HOSPADM

## 2017-09-18 RX ORDER — HEPARIN 100 UNIT/ML
SYRINGE INTRAVENOUS
Status: DISCONTINUED | OUTPATIENT
Start: 2017-09-18 | End: 2017-09-18 | Stop reason: HOSPADM

## 2017-09-18 RX ORDER — FENTANYL CITRATE 50 UG/ML
INJECTION, SOLUTION INTRAMUSCULAR; INTRAVENOUS
Status: DISCONTINUED | OUTPATIENT
Start: 2017-09-18 | End: 2017-09-18

## 2017-09-18 RX ORDER — SODIUM CHLORIDE 9 MG/ML
INJECTION, SOLUTION INTRAVENOUS CONTINUOUS
Status: DISCONTINUED | OUTPATIENT
Start: 2017-09-18 | End: 2017-09-18 | Stop reason: HOSPADM

## 2017-09-18 RX ORDER — LIDOCAINE HCL/PF 100 MG/5ML
SYRINGE (ML) INTRAVENOUS
Status: DISCONTINUED | OUTPATIENT
Start: 2017-09-18 | End: 2017-09-18

## 2017-09-18 RX ORDER — BUPIVACAINE HYDROCHLORIDE 2.5 MG/ML
INJECTION, SOLUTION EPIDURAL; INFILTRATION; INTRACAUDAL
Status: DISCONTINUED | OUTPATIENT
Start: 2017-09-18 | End: 2017-09-18 | Stop reason: HOSPADM

## 2017-09-18 RX ORDER — MEPERIDINE HYDROCHLORIDE 50 MG/ML
12.5 INJECTION INTRAMUSCULAR; INTRAVENOUS; SUBCUTANEOUS ONCE AS NEEDED
Status: DISCONTINUED | OUTPATIENT
Start: 2017-09-18 | End: 2017-09-18 | Stop reason: HOSPADM

## 2017-09-18 RX ORDER — SODIUM CHLORIDE, SODIUM LACTATE, POTASSIUM CHLORIDE, CALCIUM CHLORIDE 600; 310; 30; 20 MG/100ML; MG/100ML; MG/100ML; MG/100ML
INJECTION, SOLUTION INTRAVENOUS CONTINUOUS PRN
Status: DISCONTINUED | OUTPATIENT
Start: 2017-09-18 | End: 2017-09-18

## 2017-09-18 RX ORDER — ONDANSETRON 2 MG/ML
INJECTION INTRAMUSCULAR; INTRAVENOUS
Status: DISCONTINUED | OUTPATIENT
Start: 2017-09-18 | End: 2017-09-18

## 2017-09-18 RX ORDER — MIDAZOLAM HYDROCHLORIDE 1 MG/ML
INJECTION, SOLUTION INTRAMUSCULAR; INTRAVENOUS
Status: DISCONTINUED | OUTPATIENT
Start: 2017-09-18 | End: 2017-09-18

## 2017-09-18 RX ORDER — CEFAZOLIN SODIUM 2 G/50ML
2 SOLUTION INTRAVENOUS
Status: COMPLETED | OUTPATIENT
Start: 2017-09-18 | End: 2017-09-18

## 2017-09-18 RX ORDER — PROPOFOL 10 MG/ML
VIAL (ML) INTRAVENOUS
Status: DISCONTINUED | OUTPATIENT
Start: 2017-09-18 | End: 2017-09-18

## 2017-09-18 RX ORDER — HYDROMORPHONE HYDROCHLORIDE 2 MG/ML
0.2 INJECTION, SOLUTION INTRAMUSCULAR; INTRAVENOUS; SUBCUTANEOUS EVERY 5 MIN PRN
Status: DISCONTINUED | OUTPATIENT
Start: 2017-09-18 | End: 2017-09-18 | Stop reason: HOSPADM

## 2017-09-18 RX ORDER — ONDANSETRON 2 MG/ML
4 INJECTION INTRAMUSCULAR; INTRAVENOUS DAILY PRN
Status: DISCONTINUED | OUTPATIENT
Start: 2017-09-18 | End: 2017-09-18 | Stop reason: HOSPADM

## 2017-09-18 RX ORDER — MORPHINE SULFATE 10 MG/ML
2 INJECTION INTRAMUSCULAR; INTRAVENOUS; SUBCUTANEOUS EVERY 5 MIN PRN
Status: DISCONTINUED | OUTPATIENT
Start: 2017-09-18 | End: 2017-09-18 | Stop reason: HOSPADM

## 2017-09-18 RX ADMIN — SODIUM CHLORIDE, SODIUM LACTATE, POTASSIUM CHLORIDE, AND CALCIUM CHLORIDE: 600; 310; 30; 20 INJECTION, SOLUTION INTRAVENOUS at 11:09

## 2017-09-18 RX ADMIN — FENTANYL CITRATE 100 MCG: 50 INJECTION, SOLUTION INTRAMUSCULAR; INTRAVENOUS at 11:09

## 2017-09-18 RX ADMIN — SODIUM CHLORIDE, SODIUM LACTATE, POTASSIUM CHLORIDE, AND CALCIUM CHLORIDE: 600; 310; 30; 20 INJECTION, SOLUTION INTRAVENOUS at 12:09

## 2017-09-18 RX ADMIN — PROPOFOL 100 MG: 10 INJECTION, EMULSION INTRAVENOUS at 11:09

## 2017-09-18 RX ADMIN — MIDAZOLAM HYDROCHLORIDE 2 MG: 1 INJECTION, SOLUTION INTRAMUSCULAR; INTRAVENOUS at 11:09

## 2017-09-18 RX ADMIN — BUPIVACAINE HYDROCHLORIDE 30 ML: 2.5 INJECTION, SOLUTION EPIDURAL; INFILTRATION; INTRACAUDAL; PERINEURAL at 12:09

## 2017-09-18 RX ADMIN — LIDOCAINE HYDROCHLORIDE 80 MG: 20 INJECTION, SOLUTION INTRAVENOUS at 11:09

## 2017-09-18 RX ADMIN — ONDANSETRON 4 MG: 2 INJECTION, SOLUTION INTRAMUSCULAR; INTRAVENOUS at 12:09

## 2017-09-18 RX ADMIN — CEFAZOLIN SODIUM 2 G: 2 SOLUTION INTRAVENOUS at 11:09

## 2017-09-18 RX ADMIN — HEPARIN 15 ML: 100 SYRINGE at 12:09

## 2017-09-18 NOTE — DISCHARGE INSTRUCTIONS
Vascular Access Port Implantation   Port implantation is surgery to place (implant) a port under the skin. For vascular access, it is placed into a vein. The port allows medicines or nutrition to be sent right into your bloodstream. Blood can also be taken or given through the port. During the procedure, a long, thin tube called a catheter is threaded into one of your large veins. The tube is then attached to the port. This usually sits under the skin of your chest and causes a small bump. To use the port, a special needle is passed through your skin and into the port. The needle can stay in your skin for up to 7 days, if needed. A port can stay in place for weeks or months or longer.    Why is a vascular access port needed?  A vascular access port may allow healthcare providers to give you:  · Chemotherapy or other cancer-fighting drugs  · IV treatments, such as antibiotics or nutrition  · Hemodialysis (for kidney failure)  The port may also be used to draw blood.  Before the procedure  Follow any instructions you are given on how to prepare.  Tell your provider about any medicines you are taking. This includes:  · All prescription medicines  · Over-the-counter medicines such as aspirin or ibuprofen  · Herbs, vitamins, and other supplements  Also be sure your provider knows:  · If you are pregnant or think you may be pregnant  · If you are allergic to any medicines or substances, especially local anesthetics or iodine  · Your full medical history, including why you will need the port  · If you plan on doing any contact sports  During the procedure  · Before the procedure, an IV may be put into a vein in your arm or hand. This gives you fluids and medicines. You may be given medicine through the IV to help you relax during the procedure. This is called sedation. But some surgeons place ports using general anesthesia.  · The chest is used most often for the port. In some cases, your belly (abdomen) or arm will be  used instead.  · The skin over the insertion area is numbed with local anesthetic.  · Ultrasound or X-rays are used to help the healthcare provider guide the catheter into the proper location during the procedure.  · A cut (incision) is made in the skin where the port will be placed. A small pocket for the port is formed under the skin.  · A second small incision is made in the skin near the first incision. A tunnel under the skin is created. The catheter is put through the tunnel and into the blood vessel.  · The skin is closed over the port. It is held shut with stitches (sutures) or surgical glue or tape. The second small incision is also closed.  · A chest X-ray may be done to make sure the port is placed properly.  After the procedure  You may be taken to a recovery room where youll recover from the sedation. Nurses will check on you as you rest. If you have pain, nurses can give you medicine. If you are not staying in the hospital overnight, you will be sent home a few hours after the procedure is done. A healthcare provider will tell you when you can go home. An adult family member or friend will need to drive you home.  Recovering at home  · Take pain medicine as directed by your healthcare provider.  · Take it easy for 24 hours after the procedure. Avoid physical activity and heavy lifting until your healthcare provider says its OK.  · Keep the port clean and dry. Ask when you can shower again. You will need to keep the port dry by covering it when you shower.  · Care for the insertion site as you are directed.  · Dont swim, bathe, or do other activities that cause water to cover the insertion site.  · To keep the port from getting blocked with blood clots, flush it as often as directed. You should be shown the proper way to flush the port before you go home. It is important to follow these directions.     Risks and possible complications of implantation  · Bleeding  · Infection of the insertion  site  · Damage to a blood vessel  · Nerve injury or irritation  · Collapsed lung (for chest port placements)  · Skin breakdown over the port  Risks and possible complications of having a port  · Blocked  port or catheter  · Leakage or breakage of the port or catheter  · The port moves out of position  · Blood clot  · Skin or bloodstream infection  · Skin breakdown over the port      When to seek medical care  Call your healthcare provider right away if you have any of the following:  · A fever of 100.4°F (38.0°C) or higher  · You can't access or use the port properly  · You can't flush the port or get a blood return  · The skin near the port is red, warm, swollen, or broken  · You have shoulder pain on the side where the port is located  · You feel a heart flutter or racing heart   · Swollen arm, if the port is placed in your arm   Date Last Reviewed: 7/1/2016  © 8252-2438 [employer]. [address] [place] [address]. All rights reserved. This information is not intended as a substitute for professional medical care. Always follow your healthcare professional's instructions.        General Information:  1.  Do not drink alcoholic beverages including beer for 24 hours or as long as you are on pain medication..  2.  Do not drive a motor vehicle, operate machinery or power tools, or signs legal papers for 24 hours or as long as you are on pain medication.   3.  You may experience light-headedness, dizziness, and sleepiness following surgery. Please do not stay alone. A responsible adult should be with you for this 24 hour period.  4.  Go home and rest.  5. Progress slowly to a normal diet unless instructed.  Otherwise, begin with liquids such as soft drinks, then soup and crackers working up to solid foods. Drink plenty of nonalcoholic fluids.  6.  Certain anesthetics and pain medications produce nausea and vomiting in certain       individuals. If nausea becomes a problem at home, call you  doctor.  7. A nurse will be calling you sometime after surgery. Do not be alarmed. This is our way of finding out how you are doing.  8. Several times every hour while you are awake, take 2-3 deep breaths and cough. If you had stomach surgery hold a pillow or rolled towel firmly against your stomach before you cough. This will help with any pain the cough might cause.  9. Several times every hour while you are awake, pump and flex your feet 5-6 times and do foot circles. This will help prevent blood clots.  10.Call your doctor for severe pain, bleeding, fever, or signs or symptoms of infection (pain, swelling, redness, foul odor, drainage).  11.You can contact your doctor anytime by [phone] for the [facility] (at [facility]) or [phone] for the [facility] on [facility].   [email or web address] is another way to contact your doctor if you are an active participant online with [facility].        Acetaminophen; Oxycodone tablets  What is this medicine?  ACETAMINOPHEN; OXYCODONE (a set a DEBORAH nirav fen; ox i KOE done) is a pain reliever. It is used to treat moderate to severe pain.  How should I use this medicine?  Take this medicine by mouth with a full glass of water. Follow the directions on the prescription label. You can take it with or without food. If it upsets your stomach, take it with food. Take your medicine at regular intervals. Do not take it more often than directed.  A special MedGuide will be given to you by the pharmacist with each prescription and refill. Be sure to read this information carefully each time.  Talk to your pediatrician regarding the use of this medicine in children. Special care may be needed.  What side effects may I notice from receiving this medicine?  Side effects that you should report to your doctor or health care professional as soon as possible:  · allergic reactions like skin rash, itching or hives, swelling of the face, lips, or tongue  · breathing  problems  · confusion  · redness, blistering, peeling or loosening of the skin, including inside the mouth  · signs and symptoms of liver injury like dark yellow or brown urine; general ill feeling or flu-like symptoms; light-colored stools; loss of appetite; nausea; right upper belly pain; unusually weak or tired; yellowing of the eyes or skin  · signs and symptoms of low blood pressure like dizziness; feeling faint or lightheaded, falls; unusually weak or tired  · trouble passing urine or change in the amount of urine  Side effects that usually do not require medical attention (report to your doctor or health care professional if they continue or are bothersome):  · constipation  · dry mouth  · nausea, vomiting  · tiredness  What may interact with this medicine?  This medicine may interact with the following medications:  · alcohol  · antihistamines for allergy, cough and cold  · antiviral medicines for HIV or AIDS  · atropine  · certain antibiotics like clarithromycin, erythromycin, linezolid, rifampin  · certain medicines for anxiety or sleep  · certain medicines for bladder problems like oxybutynin, tolterodine  · certain medicines for depression like amitriptyline, fluoxetine, sertraline  · certain medicines for fungal infections like ketoconazole, itraconazole, voriconazole  · certain medicines for migraine headache like almotriptan, eletriptan, frovatriptan, naratriptan, rizatriptan, sumatriptan, zolmitriptan  · certain medicines for nausea or vomiting like dolasetron, ondansetron, palonosetron  · certain medicines for Parkinson's disease like benztropine, trihexyphenidyl  · certain medicines for seizures like phenobarbital, phenytoin, primidone  · certain medicines for stomach problems like dicyclomine, hyoscyamine  · certain medicines for travel sickness like scopolamine  · diuretics  · general anesthetics like halothane, isoflurane, methoxyflurane, propofol  · ipratropium  · local anesthetics like  lidocaine, pramoxine, tetracaine  · MAOIs like Carbex, Eldepryl, Marplan, Nardil, and Parnate  · medicines that relax muscles for surgery  · methylene blue  · nilotinib  · other medicines with acetaminophen  · other narcotic medicines for pain or cough  · phenothiazines like chlorpromazine, mesoridazine, prochlorperazine, thioridazine  What if I miss a dose?  If you miss a dose, take it as soon as you can. If it is almost time for your next dose, take only that dose. Do not take double or extra doses.  Where should I keep my medicine?  Keep out of the reach of children. This medicine can be abused. Keep your medicine in a safe place to protect it from theft. Do not share this medicine with anyone. Selling or giving away this medicine is dangerous and against the law.  This medicine may cause accidental overdose and death if it taken by other adults, children, or pets. Mix any unused medicine with a substance like cat litter or coffee grounds. Then throw the medicine away in a sealed container like a sealed bag or a coffee can with a lid. Do not use the medicine after the expiration date.  Store at room temperature between 20 and 25 degrees C (68 and 77 degrees F).  What should I tell my health care provider before I take this medicine?  They need to know if you have any of these conditions:  · brain tumor  · Crohn's disease, inflammatory bowel disease, or ulcerative colitis  · drug abuse or addiction  · head injury  · heart or circulation problems  · if you often drink alcohol  · kidney disease or problems going to the bathroom  · liver disease  · lung disease, asthma, or breathing problems  · an unusual or allergic reaction to acetaminophen, oxycodone, other opioid analgesics, other medicines, foods, dyes, or preservatives  · pregnant or trying to get pregnant  · breast-feeding  What should I watch for while using this medicine?  Tell your doctor or health care professional if your pain does not go away, if it gets  worse, or if you have new or a different type of pain. You may develop tolerance to the medicine. Tolerance means that you will need a higher dose of the medication for pain relief. Tolerance is normal and is expected if you take this medicine for a long time.  Do not suddenly stop taking your medicine because you may develop a severe reaction. Your body becomes used to the medicine. This does NOT mean you are addicted. Addiction is a behavior related to getting and using a drug for a non-medical reason. If you have pain, you have a medical reason to take pain medicine. Your doctor will tell you how much medicine to take. If your doctor wants you to stop the medicine, the dose will be slowly lowered over time to avoid any side effects.  There are different types of narcotic medicines (opiates). If you take more than one type at the same time or if you are taking another medicine that also causes drowsiness, you may have more side effects. Give your health care provider a list of all medicines you use. Your doctor will tell you how much medicine to take. Do not take more medicine than directed. Call emergency for help if you have problems breathing or unusual sleepiness.  Do not take other medicines that contain acetaminophen with this medicine. Always read labels carefully. If you have questions, ask your doctor or pharmacist.  If you take too much acetaminophen get medical help right away. Too much acetaminophen can be very dangerous and cause liver damage. Even if you do not have symptoms, it is important to get help right away.  You may get drowsy or dizzy. Do not drive, use machinery, or do anything that needs mental alertness until you know how this medicine affects you. Do not stand or sit up quickly, especially if you are an older patient. This reduces the risk of dizzy or fainting spells. Alcohol may interfere with the effect of this medicine. Avoid alcoholic drinks.  The medicine will cause constipation.  Try to have a bowel movement at least every 2 to 3 days. If you do not have a bowel movement for 3 days, call your doctor or health care professional.  Your mouth may get dry. Chewing sugarless gum or sucking hard candy, and drinking plenty or water may help. Contact your doctor if the problem does not go away or is severe.  NOTE:This sheet is a summary. It may not cover all possible information. If you have questions about this medicine, talk to your doctor, pharmacist, or health care provider. Copyright© 2017 [employer]

## 2017-09-18 NOTE — ANESTHESIA POSTPROCEDURE EVALUATION
"Anesthesia Post Evaluation    Patient: Jesse Chow    Procedure(s) Performed: Procedure(s) (LRB):  [id number]-CATH (N/A)    Final Anesthesia Type: general  Patient location during evaluation: PACU  Patient participation: Yes- Able to Participate  Level of consciousness: awake  Post-procedure vital signs: reviewed and stable  Pain management: adequate  Airway patency: patent  PONV status at discharge: No PONV  Anesthetic complications: no      Cardiovascular status: blood pressure returned to baseline  Respiratory status: unassisted, spontaneous ventilation and room air  Hydration status: euvolemic  Follow-up not needed.        Visit Vitals  /74   Pulse 78   Temp 37 °C (98.6 °F) (Temporal)   Resp 20   Ht 5' 9" (1.753 m)   Wt 85.2 kg (187 lb 13.3 oz)   SpO2 98%   BMI 27.74 kg/m²       Pain/Rahul Score: Pain Assessment Performed: Yes (9/18/2017  1:35 PM)  Presence of Pain: denies (9/18/2017  1:35 PM)  Rahul Score: 10 (9/18/2017  1:30 PM)      "

## 2017-09-18 NOTE — TRANSFER OF CARE
"Anesthesia Transfer of Care Note    Patient: Jesse Chow    Procedure(s) Performed: Procedure(s) (LRB):  [id number] (N/A)    Patient location: PACU    Anesthesia Type: general    Transport from OR: Transported from OR on room air with adequate spontaneous ventilation    Post pain: adequate analgesia    Post assessment: no apparent anesthetic complications    Post vital signs: stable    Level of consciousness: awake    Nausea/Vomiting: no nausea/vomiting    Complications: none    Transfer of care protocol was followed      Last vitals:   Visit Vitals  BP (!) 152/67 (BP Location: Right arm, Patient Position: Sitting)   Pulse 75   Temp 37.1 °C (98.8 °F) (Skin)   Resp 18   Ht 5' 9" (1.753 m)   Wt 85.2 kg (187 lb 13.3 oz)   SpO2 100%   BMI 27.74 kg/m²     "

## 2017-09-18 NOTE — PLAN OF CARE
Ambulated without difficulty to preop. Left right hearing aid at home. States speaks louder to left ear.

## 2017-09-18 NOTE — OP NOTE
Operative Note       SURGERY DATE:  09/18/2017    PRE-OP DIAGNOSIS:  Squamous cell carcinoma lung, right [C34.91]    POST-OP DIAGNOSIS:  Squamous cell carcinoma lung, right [C34.91]   Active Hospital Problems    Diagnosis  POA    Squamous cell carcinoma lung [C34.90]  Yes      Resolved Hospital Problems    Diagnosis Date Resolved POA   No resolved problems to display.       Procedure(s) (LRB):  [id number] (N/A)    Surgeon(s) and Role:     * Farooq Gray MD - Primary    ASSISTANTS: None  ANESTHESIA: General    FINDINGS: Unremarkable.    ESTIMATED BLOOD LOSS: 5 mL              COMPLICATIONS:  None    SPECIMEN:  None    Implants: Mediport    INDICATION:  Lung cancer    DESCRIPTION OF PROCEDURE:    The patient was placed supine. SCDs were placed for DVT prophylaxis and antibiotics were given 30 minutes before the incision. The bed was placed in slight Trendelenburg position and the chest and neck were prepped and draped with sterile technique. The central catheter was flushed with heparin to ensure function. Landmarks were identified and a skin entry site was chosen 1-2 cm inferior to the distal third of the clavicle. The skin and subcutaneous tissue were anesthetized with lidocaine. Local anesthesia was carried down to the periosteum of the clavicle.     As the ipsilateral arm was retracted caudad, the right subclavian vein was then located with the needle and a 10-mL syringe. The needle was inserted at the chosen site with the bevel down and directed toward the sternal notch. With continuous negative pressure in the syringe, the needle was advanced in a stepwise fashion down the clavicle. The vein was located as the needle passed beneath the bone. As soon as the syringe began to fill with venous blood, the needle was rotated 90 degrees so the bevel was pointed at the foot of the bed. The needle position was secured and the syringe removed. The hub was occluded to prevent venous air embolus. The guide wire  passed easily, and its position in the superior vena cava was confirmed by fluoroscopy. The needle was removed while the wire was held in place. A small incision was then made at the point of wire entry. A 3-cm incision was made on the anterior chest wall, and electrocautery was used to create a subcutaneous pocket for the port.     The catheter was then tunneled under the skin from the chest wall incision to the guide wire entry site. The port was placed in the subcutaneous pocket, and the catheter was cut at a suitable length to ensure correct placement of the tip in the superior vena cava. The dilator/introducer was placed over the wire and the tract gently dilated. The dilator and wire were then removed, and the catheter inserted into the introducer, which was split and removed. The tip of the catheter was confirmed in the superior vena cava by fluoroscopy.    The port was secured in place in the subcutaneous pocket with 0-Vicryl sutures. The port was aspirated to ensure adequate blood flow and then flushed with heparinized saline. Both skin incisions were then closed with 4-0 Vicryl sutures.     The patient tolerated the procedure well and was taken to the postanesthesia care unit in stable condition.  An upright chest x-ray was ordered to document location of the catheter tip and check for pneumothorax.            CONDITION: Good    DISPOSITION: PACU - hemodynamically stable.     Farooq Gray

## 2017-09-18 NOTE — INTERVAL H&P NOTE
The patient has been examined and the H&P has been reviewed:    I concur with the findings and no changes have occurred since H&P was written.    Anesthesia/Surgery risks, benefits and alternative options discussed and understood by patient/family.          Active Hospital Problems    Diagnosis  POA    Squamous cell carcinoma lung [C34.90]  Yes      Resolved Hospital Problems    Diagnosis Date Resolved POA   No resolved problems to display.

## 2017-09-18 NOTE — DISCHARGE SUMMARY
[facility]  Short Stay  Discharge Summary    Admit Date: 9/18/2017    Discharge Date and Time: 9/18/2017      Discharge Attending Physician: Farooq Gray MD     Reason for Admission: lung cancer with need for mediport.    Hospital Course (synopsis of major diagnoses, care, treatment, and services provided during the course of the hospital stay): Uneventful and full recovery    Final Diagnoses:    Principal Problem: <principal problem not specified>   Secondary Diagnoses: <principal problem not specified>    Procedures Performed: Procedure(s) (LRB):  [id number] (N/A)      Discharged Condition: good    Disposition: Home or Self Care    Discharge Condition: Stable    Follow up/Patient Instructions:  Follow-up Information     Jagjit Carroll MD.    Specialty:  Hematology and Oncology  Why:  As needed  Contact information:  [address]  [place] [address] [phone]                   Medications:      Medication List      CONTINUE taking these medications    albuterol 90 mcg/actuation inhaler  Commonly known as:  PROAIR HFA  INHALE 2 PUFFS INTO THE LUNGS EVERY 6 (SIX) HOURS AS NEEDED FOR WHEEZING.     amlodipine-benazepril 5-10 mg 5-10 mg per capsule  Commonly known as:  LOTREL  Take 1 capsule by mouth once daily.     dexamethasone 4 MG Tab  Commonly known as:  DECADRON  Take 5 tab 12 hrs before and 6 hrs before first two weekly taxol treatments     ondansetron 8 MG Tbdl  Commonly known as:  ZOFRAN-ODT  Take 1 tablet (8 mg total) by mouth every 12 (twelve) hours as needed.     prochlorperazine 5 MG tablet  Commonly known as:  COMPAZINE  Take 1 tablet (5 mg total) by mouth every 6 (six) hours as needed for Nausea.     tamsulosin 0.4 mg Cp24  Commonly known as:  FLOMAX  Take 1 capsule (0.4 mg total) by mouth once daily.            Discharge Procedure Orders  Diet general       Farooq Gray

## 2017-09-18 NOTE — PLAN OF CARE
Pt denies pain at present. Respirations even and unlabored on room air and tolerating well. VSS. Pt meets d/c criteria with exception of reading of CXR, still awaiting results. Will cont to monitor. See flow sheet for detailed assessment. Will cont to monitor.

## 2017-09-18 NOTE — H&P (VIEW-ONLY)
History & Physical    SUBJECTIVE:     History of Present Illness:  Patient is a 67 y.o. 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  He was recently diagnosed and confirmed with the CT scan of the chest.  The patient is expected to proceed with systemic chemotherapy.  Patient has been referred for a Mediport insertion.    Chief Complaint   Patient presents with    Consult     Mediport placement       Review of patient's allergies indicates:  No Known Allergies    Current Outpatient Prescriptions   Medication Sig Dispense Refill    albuterol (PROAIR HFA) 90 mcg/actuation inhaler INHALE 2 PUFFS INTO THE LUNGS EVERY 6 (SIX) HOURS AS NEEDED FOR WHEEZING. 16 g 3    amlodipine-benazepril 5-10 mg (LOTREL) 5-10 mg per capsule Take 1 capsule by mouth once daily. 30 capsule 3    dexamethasone (DECADRON) 4 MG Tab Take 5 tab 12 hrs before and 6 hrs before first two weekly taxol treatments 20 tablet 0    ondansetron (ZOFRAN-ODT) 8 MG TbDL Take 1 tablet (8 mg total) by mouth every 12 (twelve) hours as needed. 30 tablet 1    prochlorperazine (COMPAZINE) 5 MG tablet Take 1 tablet (5 mg total) by mouth every 6 (six) hours as needed for Nausea. 30 tablet 1    tamsulosin (FLOMAX) 0.4 mg Cp24 Take 1 capsule (0.4 mg total) by mouth once daily. 30 capsule 5     No current facility-administered medications for this visit.        Past Medical History:   Diagnosis Date    Borderline high blood pressure     Chronic low back pain     DDD (degenerative disc disease), lumbar 6/13/2017    H/O: Bell's palsy     H/O: Bell's palsy     Hyperlipidemia     Hypertension     Major depression     Sciatica     Squamous cell carcinoma of right lung     Tobacco abuse     Trouble in sleeping      Past Surgical History:   Procedure Laterality Date    HEMORRHOID SURGERY      NECK MASS EXCISION Right     cyst     Family History   Problem Relation Age of Onset    Hypertension Mother     Heart disease  Mother     Stroke Mother     Heart attack Father 87    Diabetes Sister     Diabetes Brother     Diabetes Brother     Heart disease Brother      Social History   Substance Use Topics    Smoking status: Former Smoker     Packs/day: 1.00     Years: 42.00     Types: Cigarettes     Quit date: 8/13/2017    Smokeless tobacco: Never Used      Comment: half a pack in 3 days      Alcohol use 0.6 oz/week     1 Glasses of wine per week      Comment: 1 glass per week        Review of Systems:  Review of Systems   Constitutional: Negative for chills and fever.   HENT: Negative for sore throat and trouble swallowing.    Eyes: Negative.    Respiratory: Positive for cough. Negative for shortness of breath.    Cardiovascular: Negative.    Gastrointestinal: Negative for abdominal distention, abdominal pain, nausea and vomiting.   Endocrine: Negative.    Genitourinary: Negative.    Musculoskeletal: Negative.    Skin: Negative.    Allergic/Immunologic: Negative.    Neurological: Negative.    Hematological: Does not bruise/bleed easily.   Psychiatric/Behavioral: Negative.        OBJECTIVE:     Vital Signs (Most Recent)  Temp: 98.2 °F (36.8 °C) (09/05/17 1515)  Pulse: 67 (09/05/17 1515)  BP: 127/76 (09/05/17 1515)     86.9 kg (191 lb 9.3 oz)     Physical Exam:  Physical Exam   Constitutional: He is oriented to person, place, and time. He appears well-developed and well-nourished.   HENT:   Head: Normocephalic.   Right Ear: External ear normal.   Left Ear: External ear normal.   Nose: Nose normal.   Eyes: Pupils are equal, round, and reactive to light. No scleral icterus.   Neck: Normal range of motion. Neck supple. No thyromegaly present.   Cardiovascular: Normal rate, regular rhythm and normal heart sounds.    No murmur heard.  Pulmonary/Chest: Effort normal and breath sounds normal.   Abdominal: Soft. Bowel sounds are normal. There is no tenderness. There is no guarding.   Musculoskeletal: Normal range of motion.    Lymphadenopathy:     He has no cervical adenopathy.   Neurological: He is alert and oriented to person, place, and time.   Skin: Skin is warm and dry.       Laboratory  Lab Results   Component Value Date    WBC 11.04 08/23/2017    HGB 12.5 (L) 08/23/2017    HCT 36.7 (L) 08/23/2017     (H) 08/23/2017    CHOL 196 05/30/2017    TRIG 83 05/30/2017    HDL 35 (L) 05/30/2017    ALT 14 08/23/2017    AST 17 08/23/2017     (L) 08/23/2017    K 4.7 08/23/2017    CL 97 08/23/2017    CREATININE 0.7 08/23/2017    BUN 6 (L) 08/23/2017    CO2 27 08/23/2017    TSH 1.347 05/30/2017    PSA 2.2 05/30/2017       No results found for this or any previous visit.      Diagnostic Results:  Labs: Reviewed  ECG: Reviewed  X-Ray: Reviewed  CT: Reviewed    None    ASSESSMENT/PLAN:     Endobronchial mass with squamous cell carcinoma of right long.     PLAN:Plan     The plan is proceed with an insertion of a Port-A-Cath.  The risk and the benefit of the procedure were fully discussed with the patient.  The surgery were performed on September 14 of 2017 at 11 AM.

## 2017-09-21 ENCOUNTER — TELEPHONE (OUTPATIENT)
Dept: HEMATOLOGY/ONCOLOGY | Facility: CLINIC | Age: 67
End: 2017-09-21

## 2017-09-21 VITALS
OXYGEN SATURATION: 98 % | HEIGHT: 69 IN | RESPIRATION RATE: 20 BRPM | SYSTOLIC BLOOD PRESSURE: 121 MMHG | TEMPERATURE: 99 F | HEART RATE: 78 BPM | BODY MASS INDEX: 27.82 KG/M2 | DIASTOLIC BLOOD PRESSURE: 74 MMHG | WEIGHT: 187.81 LBS

## 2017-09-21 NOTE — TELEPHONE ENCOUNTER
----- Message from Pamela Harris sent at 9/21/2017  9:33 AM CDT -----  Contact: Elsie/wife  Elsie called to speak with the nurse about the patient's first chemo treatments tomorrow.    She can be contacted at [phone].    Thanks,  Pamela

## 2017-09-22 ENCOUNTER — OFFICE VISIT (OUTPATIENT)
Dept: HEMATOLOGY/ONCOLOGY | Facility: CLINIC | Age: 67
End: 2017-09-22
Payer: MEDICARE

## 2017-09-22 ENCOUNTER — INFUSION (OUTPATIENT)
Dept: INFUSION THERAPY | Facility: HOSPITAL | Age: 67
End: 2017-09-22
Attending: INTERNAL MEDICINE
Payer: MEDICARE

## 2017-09-22 ENCOUNTER — HOSPITAL ENCOUNTER (OUTPATIENT)
Dept: RADIOLOGY | Facility: HOSPITAL | Age: 67
Discharge: HOME | End: 2017-09-22
Attending: INTERNAL MEDICINE
Payer: MEDICARE

## 2017-09-22 VITALS
RESPIRATION RATE: 20 BRPM | DIASTOLIC BLOOD PRESSURE: 71 MMHG | WEIGHT: 191.38 LBS | BODY MASS INDEX: 27.4 KG/M2 | TEMPERATURE: 97 F | HEART RATE: 88 BPM | OXYGEN SATURATION: 100 % | SYSTOLIC BLOOD PRESSURE: 139 MMHG | HEIGHT: 70 IN

## 2017-09-22 VITALS
DIASTOLIC BLOOD PRESSURE: 72 MMHG | OXYGEN SATURATION: 97 % | SYSTOLIC BLOOD PRESSURE: 115 MMHG | HEART RATE: 55 BPM | TEMPERATURE: 97 F | RESPIRATION RATE: 18 BRPM

## 2017-09-22 DIAGNOSIS — E87.1 HYPONATREMIA: Primary | ICD-10-CM

## 2017-09-22 DIAGNOSIS — C34.91 SQUAMOUS CELL CARCINOMA OF RIGHT LUNG: Primary | Chronic | ICD-10-CM

## 2017-09-22 DIAGNOSIS — C34.91 SQUAMOUS CELL CARCINOMA OF RIGHT LUNG: Primary | ICD-10-CM

## 2017-09-22 DIAGNOSIS — E87.1 HYPONATREMIA: ICD-10-CM

## 2017-09-22 DIAGNOSIS — R91.8 ENDOBRONCHIAL MASS: ICD-10-CM

## 2017-09-22 LAB
ALBUMIN SERPL BCP-MCNC: 3.2 G/DL
ALP SERPL-CCNC: 82 U/L
ALT SERPL W/O P-5'-P-CCNC: 16 U/L
ANION GAP SERPL CALC-SCNC: 12 MMOL/L
AST SERPL-CCNC: 14 U/L
BILIRUB SERPL-MCNC: 0.3 MG/DL
BUN SERPL-MCNC: 9 MG/DL
CALCIUM SERPL-MCNC: 9.4 MG/DL
CHLORIDE SERPL-SCNC: 93 MMOL/L
CO2 SERPL-SCNC: 22 MMOL/L
CREAT SERPL-MCNC: 0.8 MG/DL
EST. GFR  (AFRICAN AMERICAN): >60 ML/MIN/1.73 M^2
EST. GFR  (NON AFRICAN AMERICAN): >60 ML/MIN/1.73 M^2
GLUCOSE SERPL-MCNC: 209 MG/DL
OSMOLALITY SERPL: 277 MOSM/KG
POTASSIUM SERPL-SCNC: 4.8 MMOL/L
PROT SERPL-MCNC: 7.7 G/DL
SODIUM SERPL-SCNC: 127 MMOL/L
URATE SERPL-MCNC: 4.2 MG/DL

## 2017-09-22 PROCEDURE — 96375 TX/PRO/DX INJ NEW DRUG ADDON: CPT

## 2017-09-22 PROCEDURE — 99215 OFFICE O/P EST HI 40 MIN: CPT | Mod: 25,S$GLB,, | Performed by: INTERNAL MEDICINE

## 2017-09-22 PROCEDURE — 1126F AMNT PAIN NOTED NONE PRSNT: CPT | Mod: S$GLB,,, | Performed by: INTERNAL MEDICINE

## 2017-09-22 PROCEDURE — 99999 PR PBB SHADOW E&M-EST. PATIENT-LVL III: CPT | Mod: PBBFAC,,, | Performed by: INTERNAL MEDICINE

## 2017-09-22 PROCEDURE — 84550 ASSAY OF BLOOD/URIC ACID: CPT

## 2017-09-22 PROCEDURE — 3008F BODY MASS INDEX DOCD: CPT | Mod: S$GLB,,, | Performed by: INTERNAL MEDICINE

## 2017-09-22 PROCEDURE — 96415 CHEMO IV INFUSION ADDL HR: CPT

## 2017-09-22 PROCEDURE — 96413 CHEMO IV INFUSION 1 HR: CPT

## 2017-09-22 PROCEDURE — S0028 INJECTION, FAMOTIDINE, 20 MG: HCPCS | Performed by: INTERNAL MEDICINE

## 2017-09-22 PROCEDURE — 80053 COMPREHEN METABOLIC PANEL: CPT | Mod: 91

## 2017-09-22 PROCEDURE — 25000003 PHARM REV CODE 250: Performed by: INTERNAL MEDICINE

## 2017-09-22 PROCEDURE — 99499 UNLISTED E&M SERVICE: CPT | Mod: S$GLB,,, | Performed by: INTERNAL MEDICINE

## 2017-09-22 PROCEDURE — 96417 CHEMO IV INFUS EACH ADDL SEQ: CPT

## 2017-09-22 PROCEDURE — 3075F SYST BP GE 130 - 139MM HG: CPT | Mod: S$GLB,,, | Performed by: INTERNAL MEDICINE

## 2017-09-22 PROCEDURE — 3078F DIAST BP <80 MM HG: CPT | Mod: S$GLB,,, | Performed by: INTERNAL MEDICINE

## 2017-09-22 PROCEDURE — 1159F MED LIST DOCD IN RCRD: CPT | Mod: S$GLB,,, | Performed by: INTERNAL MEDICINE

## 2017-09-22 PROCEDURE — 96367 TX/PROPH/DG ADDL SEQ IV INF: CPT

## 2017-09-22 PROCEDURE — 83930 ASSAY OF BLOOD OSMOLALITY: CPT

## 2017-09-22 PROCEDURE — 63600175 PHARM REV CODE 636 W HCPCS: Performed by: INTERNAL MEDICINE

## 2017-09-22 RX ORDER — FAMOTIDINE 20 MG/50ML
20 INJECTION, SOLUTION INTRAVENOUS
Status: CANCELLED
Start: 2017-09-22 | End: 2017-09-22

## 2017-09-22 RX ORDER — SODIUM CHLORIDE 0.9 % (FLUSH) 0.9 %
10 SYRINGE (ML) INJECTION
Status: CANCELLED | OUTPATIENT
Start: 2017-09-22

## 2017-09-22 RX ORDER — DIPHENHYDRAMINE HYDROCHLORIDE 50 MG/ML
50 INJECTION INTRAMUSCULAR; INTRAVENOUS ONCE AS NEEDED
Status: CANCELLED | OUTPATIENT
Start: 2017-09-22 | End: 2017-09-22

## 2017-09-22 RX ORDER — HEPARIN 100 UNIT/ML
500 SYRINGE INTRAVENOUS
Status: CANCELLED | OUTPATIENT
Start: 2017-09-22

## 2017-09-22 RX ORDER — EPINEPHRINE 0.3 MG/.3ML
0.3 INJECTION SUBCUTANEOUS ONCE AS NEEDED
Status: CANCELLED | OUTPATIENT
Start: 2017-09-22 | End: 2017-09-22

## 2017-09-22 RX ORDER — ONDANSETRON 2 MG/ML
8 INJECTION INTRAMUSCULAR; INTRAVENOUS
Status: CANCELLED
Start: 2017-09-22 | End: 2017-09-22

## 2017-09-22 RX ORDER — FAMOTIDINE 20 MG/50ML
20 INJECTION, SOLUTION INTRAVENOUS
Status: COMPLETED | OUTPATIENT
Start: 2017-09-22 | End: 2017-09-22

## 2017-09-22 RX ORDER — HEPARIN 100 UNIT/ML
500 SYRINGE INTRAVENOUS
Status: DISCONTINUED | OUTPATIENT
Start: 2017-09-22 | End: 2017-09-22 | Stop reason: HOSPADM

## 2017-09-22 RX ORDER — ONDANSETRON 2 MG/ML
8 INJECTION INTRAMUSCULAR; INTRAVENOUS
Status: COMPLETED | OUTPATIENT
Start: 2017-09-22 | End: 2017-09-22

## 2017-09-22 RX ORDER — SODIUM CHLORIDE 9 MG/ML
10 INJECTION, SOLUTION INTRAMUSCULAR; INTRAVENOUS; SUBCUTANEOUS
Status: DISCONTINUED | OUTPATIENT
Start: 2017-09-22 | End: 2017-09-22 | Stop reason: HOSPADM

## 2017-09-22 RX ADMIN — FAMOTIDINE 20 MG: 20 INJECTION, SOLUTION INTRAVENOUS at 10:09

## 2017-09-22 RX ADMIN — CARBOPLATIN 275 MG: 10 INJECTION, SOLUTION INTRAVENOUS at 01:09

## 2017-09-22 RX ADMIN — HEPARIN 500 UNITS: 100 SYRINGE at 02:09

## 2017-09-22 RX ADMIN — PACLITAXEL 96 MG: 6 INJECTION, SOLUTION, CONCENTRATE INTRAVENOUS at 11:09

## 2017-09-22 RX ADMIN — ONDANSETRON 8 MG: 2 INJECTION INTRAMUSCULAR; INTRAVENOUS at 10:09

## 2017-09-22 RX ADMIN — DIPHENHYDRAMINE HYDROCHLORIDE 50 MG: 50 INJECTION, SOLUTION INTRAMUSCULAR; INTRAVENOUS at 11:09

## 2017-09-22 RX ADMIN — DEXAMETHASONE SODIUM PHOSPHATE 20 MG: 4 INJECTION, SOLUTION INTRA-ARTICULAR; INTRALESIONAL; INTRAMUSCULAR; INTRAVENOUS; SOFT TISSUE at 10:09

## 2017-09-22 NOTE — PROGRESS NOTES
Reason for visit: Squamous cell carcinoma of the right lung  Date of Diagnosis: August 22, 2017    HPI:   The patient is a 67-year-old -American male who presents to the hematology oncology clinic today to discuss further evaluation and management recommendations for squamous cell carcinoma of the right lung.  The patient has been referred to me by Dr. Zackery Peña with pulmonary medicine.  I have reviewed all of the patient's clinical history available in the medical record and have utilized this in my evaluation and management recommendations today.  Today the patient reports that he has a chronic cough with clear sputum.  He has had this for about 3-4 months.  He denies any fevers, chills or night sweats.  He denies any loss of appetite or unintentional weight loss.  He denies any melena, hemoptysis, hematemesis, hematuria or hematochezia.  He denies any bowel or urinary complaints.  He denies any chest pain.  He does report dyspnea with exertion.  He reports occasional headaches.    PAST MEDICAL HISTORY:   1.  Hypertension  2.  Dyslipidemia  3.  Bell's palsy  4.  Osteoarthritis/degenerative disc disease  5.  BPH  6.  Hearing impaired  7.  Vitamin D deficiency  8.  Depression    SURGICAL HISTORY:   1.  Hemorrhoidectomy  2.  Right neck mass excision which was benign    FAMILY HISTORY: He denies any immediate family members with cancer or bleeding/clotting disorders.    SOCIAL HISTORY: He reports of 40-pack-year smoking history and quit in August 2017.  He reports taking alcohol socially.  He has never used any recreational drugs.  He is medically disabled.  He is .    ALLERGIES: [NKDA]    MEDICATIONS: [Medcard has been reviewed and/or reconciled.]    REVIEW OF SYSTEMS:   GENERAL: [No fevers, chills or sweats. No fatigue, weight loss or loss of appetite.]  HEENT: [No blurred vision, tinnitus, nasal discharge, sorethroat or dysphagia.]  HEART: [No chest pain, palpitations or shortness of breath.]     LUNGS: [Reports cough. Denies hemoptysis or breathing problems.]  ABDOMEN: [No abdominal pain, nausea, vomiting, diarrhea, constipation or melena.]  GENITOURINARY: [No dysuria, bleeding or malodorous discharge.]  NEURO: [No headache, dizziness or vertigo.]  HEMATOLOGY: [No easy bruising, spontaneous bleeding or blood clots in the past].  MUSCULOSKELETAL: [No arthralgias, myalgias or bone pains.]  SKIN: [No rashes or skin lesions.]  PSYCHIATRY: [No depression. Reports anxiety.]    PHYSICAL EXAMINATION:   VS: Reviewed on nurse's notes.  APPEARANCE: The patient is a well-developed, well-nourished and well-groomed -American male who appears in no acute distress.  He was accompanied to this clinic visit by his wife.  HEENT: No scleral icterus. Both external auditory canals clear. No oral ulcers, lesions. Throat clear  HEAD: No sinus tenderness.  NECK: Supple. No palpable lymphadenopathy. Thyroid non-tender, no palpable masses  CHEST: Scattered rales.  Scattered wheezes.  No rhonchi. Unlabored respirations.  CARDIOVASCULAR: Normal S1, S2. Normal rate. Regular rhythm.  ABDOMEN: Bowel sounds normal. No tenderness. No abdominal distention. No hepatomegaly. No splenomegaly.  LYMPHATIC: No palpable supraclavicular, axillary nodes  EXTREMITIES: No clubbing, cyanosis, edema  SKIN: No lesions. No petechiae. No ecchymoses. No induration or nodules  NEUROLOGIC: No focal findings. Alert & Oriented x 3. Mood appropriate to affect    LABS:   Reviewed    IMAGING:  Reviewed    IMPRESSION:  1.  Squamous cell carcinoma of the right lung    PLAN:  1.  I had a detailed discussion with the patient today with regard to the diagnosis, prognosis and treatment for his newly diagnosed squamous cell carcinoma of the right lung.  2.  MRI of the brain done on August 25, 2017 does not show any evidence of metastatic disease .  I reviewed the results of his recent lab work with him in detail as well.  He expressed understanding.  3. He is  scheduled to start XRT to symptomatic RIGHT hilar mass with narrowing of the RIGHT upper lobe bronchus and extension of soft tissue density to the david today.    4. Proceed with week 1 of carboplatin/Taxol with XRT today.    Follow-up in 1 week with labs for week 2 of carboplatin/Taxol plus XRT.  He knows to call sooner if any new problems or questions.      Burt De Luna MD

## 2017-09-22 NOTE — PATIENT INSTRUCTIONS
[facility]  [address]  [address] [address]  [phone] phone     [phone] fax  Hours of Operation: Monday- Friday 8:00am- 5:00pm  After hours phone  [phone]  Hematology / Oncology Physicians on call      Dr. Glen Welch                        Please call with any concerns regarding your appointment today.  FALL PREVENTION   Falls often occur due to slipping, tripping or losing your balance. Here are ways to reduce your risk of falling again.   Was there anything that caused your fall that can be fixed, removed or replaced?   Make your home safe by keeping walkways clear of objects you may trip over.   Use non-slip pads under rugs.   Do not walk in poorly lit areas.   Do not stand on chairs or wobbly ladders.   Use caution when reaching overhead or looking upward. This position can cause a loss of balance.   Be sure your shoes fit properly, have non-slip bottoms and are in good condition.   Be cautious when going up and down stairs, curbs, and when walking on uneven sidewalks.   If your balance is poor, consider using a cane or walker.   If your fall was related to alcohol use, stop or limit alcohol intake.   If your fall was related to use of sleeping medicines, talk to your doctor about this. You may need to reduce your dosage at bedtime if you awaken during the night to go to the bathroom.   To reduce the need for nighttime bathroom trips:   Avoid drinking fluids for several hours before going to bed   Empty your bladder before going to bed   Men can keep a urinal at the bedside   © 4147-8662 [employer], [address] [place] [address], [place] [address]. All rights reserved. This information is not intended as a substitute for professional medical care. Always follow your healthcare professional's instructions.

## 2017-09-22 NOTE — PLAN OF CARE
Problem: Patient Care Overview  Goal: Plan of Care Review  Outcome: Ongoing (interventions implemented as appropriate)  Pt stated, I am a little nervous about starting chemo today

## 2017-09-25 ENCOUNTER — LAB VISIT (OUTPATIENT)
Dept: LAB | Facility: HOSPITAL | Age: 67
End: 2017-09-25
Attending: INTERNAL MEDICINE
Payer: MEDICARE

## 2017-09-25 ENCOUNTER — TELEPHONE (OUTPATIENT)
Dept: HEMATOLOGY/ONCOLOGY | Facility: CLINIC | Age: 67
End: 2017-09-25

## 2017-09-25 DIAGNOSIS — E87.1 HYPONATREMIA: ICD-10-CM

## 2017-09-25 DIAGNOSIS — E87.1 HYPONATREMIA: Primary | ICD-10-CM

## 2017-09-25 LAB
ALBUMIN SERPL BCP-MCNC: 3.2 G/DL
ALP SERPL-CCNC: 78 U/L
ALT SERPL W/O P-5'-P-CCNC: 16 U/L
ANION GAP SERPL CALC-SCNC: 8 MMOL/L
AST SERPL-CCNC: 12 U/L
BILIRUB SERPL-MCNC: 1 MG/DL
BUN SERPL-MCNC: 11 MG/DL
CALCIUM SERPL-MCNC: 9.3 MG/DL
CHLORIDE SERPL-SCNC: 95 MMOL/L
CO2 SERPL-SCNC: 27 MMOL/L
CREAT SERPL-MCNC: 0.7 MG/DL
EST. GFR  (AFRICAN AMERICAN): >60 ML/MIN/1.73 M^2
EST. GFR  (NON AFRICAN AMERICAN): >60 ML/MIN/1.73 M^2
GLUCOSE SERPL-MCNC: 115 MG/DL
POTASSIUM SERPL-SCNC: 4 MMOL/L
PROT SERPL-MCNC: 7.5 G/DL
SODIUM SERPL-SCNC: 130 MMOL/L

## 2017-09-25 PROCEDURE — 36415 COLL VENOUS BLD VENIPUNCTURE: CPT | Mod: PO

## 2017-09-25 PROCEDURE — 80053 COMPREHEN METABOLIC PANEL: CPT | Mod: PO

## 2017-09-25 NOTE — TELEPHONE ENCOUNTER
----- Message from Burt De Luna MD sent at 9/25/2017  9:33 AM CDT -----  Please call his wife and let her know that sodium level is better on lab today which is good news.  Encourage him to continue to hydrate himself well as we discussed on phone over weekend. Thank you.

## 2017-09-27 ENCOUNTER — TELEPHONE (OUTPATIENT)
Dept: HEMATOLOGY/ONCOLOGY | Facility: CLINIC | Age: 67
End: 2017-09-27

## 2017-09-27 NOTE — TELEPHONE ENCOUNTER
----- Message from Stella Hernández sent at 9/27/2017 12:51 PM CDT -----  Contact: Patients wife, carter Zimmerman is  returning a call, please shaka her back at [phone]. Thank you

## 2017-09-27 NOTE — TELEPHONE ENCOUNTER
----- Message from Jolene Akbar MA sent at 9/27/2017  3:05 PM CDT -----  Contact: pt's wife   I spoke with pt he stated dr maurer nurse call and wanted him to come in early for his appt ...   Im not sure why? Please call pt wife because pt does not drive and wife works.   ----- Message -----  From: Janet Iraheta LPN  Sent: 9/27/2017   2:14 PM  To: Annamarie Dallas Staff        ----- Message -----  From: Aida Salter  Sent: 9/27/2017   1:43 PM  To: Glen SENIOR Staff    She's returning a missed call, please advise [phone]

## 2017-09-29 ENCOUNTER — INFUSION (OUTPATIENT)
Dept: INFUSION THERAPY | Facility: HOSPITAL | Age: 67
End: 2017-09-29
Attending: INTERNAL MEDICINE
Payer: MEDICARE

## 2017-09-29 ENCOUNTER — SOCIAL WORK (OUTPATIENT)
Dept: HEMATOLOGY/ONCOLOGY | Facility: CLINIC | Age: 67
End: 2017-09-29

## 2017-09-29 ENCOUNTER — DOCUMENTATION ONLY (OUTPATIENT)
Dept: RADIATION ONCOLOGY | Facility: CLINIC | Age: 67
End: 2017-09-29

## 2017-09-29 ENCOUNTER — OFFICE VISIT (OUTPATIENT)
Dept: HEMATOLOGY/ONCOLOGY | Facility: CLINIC | Age: 67
End: 2017-09-29
Payer: MEDICARE

## 2017-09-29 VITALS
HEIGHT: 70 IN | SYSTOLIC BLOOD PRESSURE: 102 MMHG | HEART RATE: 53 BPM | WEIGHT: 186.94 LBS | OXYGEN SATURATION: 99 % | BODY MASS INDEX: 26.76 KG/M2 | TEMPERATURE: 127 F | DIASTOLIC BLOOD PRESSURE: 56 MMHG | RESPIRATION RATE: 18 BRPM

## 2017-09-29 VITALS
BODY MASS INDEX: 26.76 KG/M2 | OXYGEN SATURATION: 95 % | SYSTOLIC BLOOD PRESSURE: 135 MMHG | HEIGHT: 70 IN | WEIGHT: 186.94 LBS | HEART RATE: 101 BPM | DIASTOLIC BLOOD PRESSURE: 82 MMHG | TEMPERATURE: 98 F

## 2017-09-29 DIAGNOSIS — C34.91 SQUAMOUS CELL CARCINOMA OF RIGHT LUNG: Primary | ICD-10-CM

## 2017-09-29 DIAGNOSIS — C34.91 SQUAMOUS CELL CARCINOMA OF RIGHT LUNG: Primary | Chronic | ICD-10-CM

## 2017-09-29 PROCEDURE — 25000003 PHARM REV CODE 250: Performed by: INTERNAL MEDICINE

## 2017-09-29 PROCEDURE — 96368 THER/DIAG CONCURRENT INF: CPT

## 2017-09-29 PROCEDURE — 3008F BODY MASS INDEX DOCD: CPT | Mod: S$GLB,,, | Performed by: INTERNAL MEDICINE

## 2017-09-29 PROCEDURE — A4216 STERILE WATER/SALINE, 10 ML: HCPCS | Performed by: INTERNAL MEDICINE

## 2017-09-29 PROCEDURE — 99499 UNLISTED E&M SERVICE: CPT | Mod: S$GLB,,, | Performed by: INTERNAL MEDICINE

## 2017-09-29 PROCEDURE — 99999 PR PBB SHADOW E&M-EST. PATIENT-LVL III: CPT | Mod: PBBFAC,,, | Performed by: INTERNAL MEDICINE

## 2017-09-29 PROCEDURE — 99215 OFFICE O/P EST HI 40 MIN: CPT | Mod: 25,S$GLB,, | Performed by: INTERNAL MEDICINE

## 2017-09-29 PROCEDURE — 96413 CHEMO IV INFUSION 1 HR: CPT

## 2017-09-29 PROCEDURE — 63600175 PHARM REV CODE 636 W HCPCS: Performed by: INTERNAL MEDICINE

## 2017-09-29 PROCEDURE — 96415 CHEMO IV INFUSION ADDL HR: CPT

## 2017-09-29 PROCEDURE — S0028 INJECTION, FAMOTIDINE, 20 MG: HCPCS | Performed by: INTERNAL MEDICINE

## 2017-09-29 PROCEDURE — 1126F AMNT PAIN NOTED NONE PRSNT: CPT | Mod: S$GLB,,, | Performed by: INTERNAL MEDICINE

## 2017-09-29 PROCEDURE — 96367 TX/PROPH/DG ADDL SEQ IV INF: CPT

## 2017-09-29 PROCEDURE — 96417 CHEMO IV INFUS EACH ADDL SEQ: CPT

## 2017-09-29 PROCEDURE — 3079F DIAST BP 80-89 MM HG: CPT | Mod: S$GLB,,, | Performed by: INTERNAL MEDICINE

## 2017-09-29 PROCEDURE — 3075F SYST BP GE 130 - 139MM HG: CPT | Mod: S$GLB,,, | Performed by: INTERNAL MEDICINE

## 2017-09-29 PROCEDURE — 96375 TX/PRO/DX INJ NEW DRUG ADDON: CPT

## 2017-09-29 PROCEDURE — 1159F MED LIST DOCD IN RCRD: CPT | Mod: S$GLB,,, | Performed by: INTERNAL MEDICINE

## 2017-09-29 RX ORDER — DIPHENHYDRAMINE HYDROCHLORIDE 50 MG/ML
50 INJECTION INTRAMUSCULAR; INTRAVENOUS ONCE AS NEEDED
Status: CANCELLED | OUTPATIENT
Start: 2017-09-29 | End: 2017-09-29

## 2017-09-29 RX ORDER — FAMOTIDINE 20 MG/50ML
20 INJECTION, SOLUTION INTRAVENOUS
Status: COMPLETED | OUTPATIENT
Start: 2017-09-29 | End: 2017-09-29

## 2017-09-29 RX ORDER — SODIUM CHLORIDE 0.9 % (FLUSH) 0.9 %
10 SYRINGE (ML) INJECTION
Status: CANCELLED | OUTPATIENT
Start: 2017-09-29

## 2017-09-29 RX ORDER — SODIUM CHLORIDE 9 MG/ML
10 INJECTION, SOLUTION INTRAMUSCULAR; INTRAVENOUS; SUBCUTANEOUS
Status: DISCONTINUED | OUTPATIENT
Start: 2017-09-29 | End: 2017-09-29 | Stop reason: HOSPADM

## 2017-09-29 RX ORDER — FAMOTIDINE 20 MG/50ML
20 INJECTION, SOLUTION INTRAVENOUS
Status: CANCELLED
Start: 2017-09-29 | End: 2017-09-29

## 2017-09-29 RX ORDER — HEPARIN 100 UNIT/ML
500 SYRINGE INTRAVENOUS
Status: CANCELLED | OUTPATIENT
Start: 2017-09-29

## 2017-09-29 RX ORDER — ONDANSETRON 2 MG/ML
8 INJECTION INTRAMUSCULAR; INTRAVENOUS
Status: COMPLETED | OUTPATIENT
Start: 2017-09-29 | End: 2017-09-29

## 2017-09-29 RX ORDER — HEPARIN 100 UNIT/ML
500 SYRINGE INTRAVENOUS
Status: DISCONTINUED | OUTPATIENT
Start: 2017-09-29 | End: 2017-09-29 | Stop reason: HOSPADM

## 2017-09-29 RX ORDER — ONDANSETRON 2 MG/ML
8 INJECTION INTRAMUSCULAR; INTRAVENOUS
Status: CANCELLED
Start: 2017-09-29 | End: 2017-09-29

## 2017-09-29 RX ORDER — EPINEPHRINE 0.3 MG/.3ML
0.3 INJECTION SUBCUTANEOUS ONCE AS NEEDED
Status: CANCELLED | OUTPATIENT
Start: 2017-09-29 | End: 2017-09-29

## 2017-09-29 RX ADMIN — SODIUM CHLORIDE: 0.9 INJECTION, SOLUTION INTRAVENOUS at 11:09

## 2017-09-29 RX ADMIN — DIPHENHYDRAMINE HYDROCHLORIDE 50 MG: 50 INJECTION, SOLUTION INTRAMUSCULAR; INTRAVENOUS at 12:09

## 2017-09-29 RX ADMIN — HEPARIN 500 UNITS: 100 SYRINGE at 03:09

## 2017-09-29 RX ADMIN — DEXAMETHASONE SODIUM PHOSPHATE 20 MG: 4 INJECTION, SOLUTION INTRA-ARTICULAR; INTRALESIONAL; INTRAMUSCULAR; INTRAVENOUS; SOFT TISSUE at 11:09

## 2017-09-29 RX ADMIN — SODIUM CHLORIDE, PRESERVATIVE FREE 10 ML: 5 INJECTION INTRAVENOUS at 11:09

## 2017-09-29 RX ADMIN — PACLITAXEL 90 MG: 6 INJECTION, SOLUTION, CONCENTRATE INTRAVENOUS at 12:09

## 2017-09-29 RX ADMIN — CARBOPLATIN 270 MG: 10 INJECTION, SOLUTION INTRAVENOUS at 03:09

## 2017-09-29 RX ADMIN — ONDANSETRON 8 MG: 2 INJECTION INTRAMUSCULAR; INTRAVENOUS at 11:09

## 2017-09-29 RX ADMIN — FAMOTIDINE 20 MG: 20 INJECTION, SOLUTION INTRAVENOUS at 11:09

## 2017-09-29 NOTE — PLAN OF CARE
"Problem: Patient Care Overview  Goal: Plan of Care Review  Outcome: Ongoing (interventions implemented as appropriate)  Pt. Stated, "I'm doing pretty good."      "

## 2017-09-29 NOTE — PLAN OF CARE
Problem: Chemotherapy Effects (Adult)  Intervention: Monitor/Manage Chemotherapy Gastrointestinal Effects  PT. Given antinausea and steroid meds to prevent nausea and vomiting.

## 2017-09-29 NOTE — PROGRESS NOTES
MSW intern met with pt to discuss  and any needs that were of concerns. Pt stated he was doing fine and engaged in further conversation. Intern discussed the checklist of resources that can be provided and, in particular, the intern stated transportation. Pt stated that he would be interested in transportation so that his wife would not have to worry about bringing him to his appts. Pt stated that he feels like a burden because he cannot drive himself to his appt. 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 Intern stated that she will present the same information the next time the wife is present. Intern discussed Cancer Services and pt stated that he will not need a referral to them as of now. Pt stated that he cannot wait to beat this because he knows he will beat cancer. Pt requested intern contact information. Intern provided the checklist for the pt and his wife to read and the contact information. F/u next week.

## 2017-09-29 NOTE — PATIENT INSTRUCTIONS
[facility]  [address]  [address] [address]  [phone] phone     [phone] fax  Hours of Operation: Monday- Friday 8:00am- 5:00pm  After hours phone  [phone]  Hematology / Oncology Physicians on call      Dr. Glen Welch                        Please call with any concerns regarding your appointment today.  HOME CARE AFTER CHEMOTHERAPY   Meals   Many patients feel sick and lose their appetites during treatment. Eat small meals several times a day. Choose bland foods with little taste or smell if you have problems with nausea. Be sure to cook all food thoroughly. This kills bacteria and helps you avoid intestinal infection. Soft foods are easier to swallow and digest.   Activity   Exercise keeps you strong and keeps your heart and lungs active. Talk to your doctor about an appropriate exercise program for you.   Skin Care   To prevent a skin infection, bathe or shower once a day. Use a moisturizing soap and wash with warm water. Avoid very hot or cold water. Chemotherapy can make your skin dry . Apply moisturizing lotion to help relieve dry skin. Some drugs used in high doses can cause slight burns to appear (like sunburn). Ask for a special cream to help relieve the burn and protect your skin.   Prevent Mouth Sores   During chemotherapy, many people get mouth sores. Do the following to help prevent mouth sores or to ease discomfort.   Brush your teeth with a soft-bristle toothbrush after every meal.  Don't use dental floss if your platelet count is below 50,000. Your doctor or nurse will tell you if this is the case.  Use an oral swab or special soft toothbrush if your gums bleed during regular brushing.  Use mouthwash as directed. If you can't tolerate commercial mouthwash, use salt and baking soda to clean your mouth. Mix 1 teaspoon of salt and 1 teaspoon of baking soda into a glass of water. Swish and spit.  Call your doctor  or return to this facility if you develop any of the following:   Sore throat   White patches in the mouth or throat   Fever of 100.4ºF (38ºC) or higher, or as directed by your healthcare provider  © 2000-2011 [employer] [facility], [address] [place] [address]. All rights reserved. This information is not intended as a substitute for professional medical care. Always follow your healthcare professional's   FALL PREVENTION   Falls often occur due to slipping, tripping or losing your balance. Here are ways to reduce your risk of falling again.   Was there anything that caused your fall that can be fixed, removed or replaced?   Make your home safe by keeping walkways clear of objects you may trip over.   Use non-slip pads under rugs.   Do not walk in poorly lit areas.   Do not stand on chairs or wobbly ladders.   Use caution when reaching overhead or looking upward. This position can cause a loss of balance.   Be sure your shoes fit properly, have non-slip bottoms and are in good condition.   Be cautious when going up and down stairs, curbs, and when walking on uneven sidewalks.   If your balance is poor, consider using a cane or walker.   If your fall was related to alcohol use, stop or limit alcohol intake.   If your fall was related to use of sleeping medicines, talk to your doctor about this. You may need to reduce your dosage at bedtime if you awaken during the night to go to the bathroom.   To reduce the need for nighttime bathroom trips:   Avoid drinking fluids for several hours before going to bed   Empty your bladder before going to bed   Men can keep a urinal at the bedside   © 2000-2011 [employer] [facility], [address] [place] [address]. All rights reserved. This information is not intended as a substitute for professional medical care. Always follow your healthcare professional's instructions.

## 2017-09-29 NOTE — PROGRESS NOTES
Subjective:       Patient ID: Jesse Chow is a 67 y.o. male.    Chief Complaint: Results; Chemotherapy; and Lung Cancer    HPI 67-year-old male locally advanced non-small cell lung carcinoma squamous subtype patient is receiving weekly carboplatinum Taxol was supposed to begin radiation therapy but declined anxious in this regard ECOG status 1    Past Medical History:   Diagnosis Date    Borderline high blood pressure     Chronic low back pain     DDD (degenerative disc disease), lumbar 6/13/2017    H/O: Bell's palsy     H/O: Bell's palsy     Hyperlipidemia     Hypertension     Major depression     Sciatica     Squamous cell carcinoma of right lung     Tobacco abuse     Trouble in sleeping      Family History   Problem Relation Age of Onset    Hypertension Mother     Heart disease Mother     Stroke Mother     Heart attack Father 87    Diabetes Sister     Diabetes Brother     Diabetes Brother     Heart disease Brother      Social History     Social History    Marital status:      Spouse name: N/A    Number of children: N/A    Years of education: N/A     Occupational History    Not on file.     Social History Main Topics    Smoking status: Former Smoker     Packs/day: 1.00     Years: 42.00     Types: Cigarettes     Quit date: 8/13/2017    Smokeless tobacco: Never Used      Comment: half a pack in 3 days      Alcohol use 0.6 oz/week     1 Glasses of wine per week      Comment: 1 glass per week    Drug use: No    Sexual activity: No     Other Topics Concern    Not on file     Social History Narrative    No narrative on file     Past Surgical History:   Procedure Laterality Date    HEMORRHOID SURGERY      NECK MASS EXCISION Right     cyst       Labs:  Lab Results   Component Value Date    WBC 6.96 09/29/2017    HGB 11.5 (L) 09/29/2017    HCT 32.6 (L) 09/29/2017    MCV 78 (L) 09/29/2017     09/29/2017     BMP  Lab Results   Component Value Date     (L) 09/29/2017     K 4.5 09/29/2017    CL 96 09/29/2017    CO2 22 (L) 09/29/2017    BUN 9 09/29/2017    CREATININE 0.8 09/29/2017    CALCIUM 9.1 09/29/2017    ANIONGAP 11 09/29/2017    ESTGFRAFRICA >60 09/29/2017    EGFRNONAA >60 09/29/2017     Lab Results   Component Value Date    ALT 17 09/29/2017    AST 11 09/29/2017    ALKPHOS 68 09/29/2017    BILITOT 0.4 09/29/2017       No results found for: IRON, TIBC, FERRITIN, SATURATEDIRO  Lab Results   Component Value Date    OAMCRTCI53 321 06/13/2017     No results found for: FOLATE  Lab Results   Component Value Date    TSH 1.347 05/30/2017         Review of Systems   Constitutional: Positive for activity change and fatigue. Negative for appetite change, chills, diaphoresis, fever and unexpected weight change.   HENT: Negative for congestion, dental problem, drooling, ear discharge, ear pain, facial swelling, hearing loss, mouth sores, nosebleeds, postnasal drip, rhinorrhea, sinus pressure, sneezing, sore throat, tinnitus, trouble swallowing and voice change.    Eyes: Negative for photophobia, pain, discharge, redness, itching and visual disturbance.   Respiratory: Negative for apnea, cough, choking, chest tightness, shortness of breath, wheezing and stridor.    Cardiovascular: Negative for chest pain, palpitations and leg swelling.   Gastrointestinal: Negative for abdominal distention, abdominal pain, anal bleeding, blood in stool, constipation, diarrhea, nausea, rectal pain and vomiting.   Endocrine: Negative for cold intolerance, heat intolerance, polydipsia, polyphagia and polyuria.   Genitourinary: Negative for decreased urine volume, difficulty urinating, discharge, dysuria, enuresis, flank pain, frequency, genital sores, hematuria, penile pain, penile swelling, scrotal swelling, testicular pain and urgency.   Musculoskeletal: Negative for arthralgias, back pain, gait problem, joint swelling, myalgias, neck pain and neck stiffness.   Skin: Negative for color change, pallor, rash  and wound.   Allergic/Immunologic: Negative for environmental allergies, food allergies and immunocompromised state.   Neurological: Positive for weakness. Negative for dizziness, tremors, seizures, syncope, facial asymmetry, speech difficulty, light-headedness, numbness and headaches.   Hematological: Negative for adenopathy. Does not bruise/bleed easily.   Psychiatric/Behavioral: Positive for dysphoric mood. Negative for agitation, behavioral problems, confusion, decreased concentration, hallucinations, self-injury, sleep disturbance and suicidal ideas. The patient is nervous/anxious. The patient is not hyperactive.        Objective:      Physical Exam   Constitutional: He is oriented to person, place, and time. He appears well-developed and well-nourished. He appears distressed.   HENT:   Head: Normocephalic.   Right Ear: External ear normal.   Left Ear: External ear normal.   Nose: Nose normal. Right sinus exhibits no maxillary sinus tenderness and no frontal sinus tenderness. Left sinus exhibits no maxillary sinus tenderness and no frontal sinus tenderness.   Mouth/Throat: Oropharynx is clear and moist. No oropharyngeal exudate.   Eyes: EOM and lids are normal. Pupils are equal, round, and reactive to light. Right eye exhibits no discharge. Left eye exhibits no discharge. Right conjunctiva is not injected. Right conjunctiva has no hemorrhage. Left conjunctiva is not injected. Left conjunctiva has no hemorrhage. No scleral icterus. Right eye exhibits normal extraocular motion. Left eye exhibits normal extraocular motion.   Neck: Normal range of motion. Neck supple. No JVD present. No tracheal deviation present. No thyromegaly present.   Cardiovascular: Normal rate, regular rhythm and normal heart sounds.    Pulmonary/Chest: Effort normal and breath sounds normal. No stridor. No respiratory distress.   Abdominal: Soft. Bowel sounds are normal. He exhibits no mass. There is no hepatosplenomegaly, splenomegaly or  hepatomegaly. There is no tenderness.   Musculoskeletal: Normal range of motion. He exhibits no edema or tenderness.   Lymphadenopathy:        Head (right side): No posterior auricular and no occipital adenopathy present.        Head (left side): No posterior auricular and no occipital adenopathy present.     He has no cervical adenopathy.        Right cervical: No superficial cervical, no deep cervical and no posterior cervical adenopathy present.       Left cervical: No superficial cervical, no deep cervical and no posterior cervical adenopathy present.     He has no axillary adenopathy.        Right: No supraclavicular adenopathy present.        Left: No supraclavicular adenopathy present.   Neurological: He is alert and oriented to person, place, and time. He has normal strength. No cranial nerve deficit. Coordination normal.   Skin: Skin is dry. No rash noted. He is not diaphoretic. No cyanosis or erythema. Nails show no clubbing.   Psychiatric: His behavior is normal. Judgment and thought content normal. His mood appears anxious. Cognition and memory are normal. He exhibits a depressed mood.   Vitals reviewed.          Assessment:      1. Squamous cell carcinoma of right lung           Plan:   Locally advanced non-small cell lung carcinoma continue with current carboplatinum Taxol dosing reviewed with weight loss reduce dose of carboplatinum.  Patient will begin radiation therapy today discussed with him the need to have radiation therapy and combined modality setting

## 2017-09-30 NOTE — ANESTHESIA RELEASE NOTE
"Anesthesia Release from PACU Note    Patient: Jesse Chow    Procedure(s) Performed: Procedure(s) (LRB):  [id number] (N/A)    Anesthesia type: general    Post pain: Adequate analgesia    Post assessment: no apparent anesthetic complications    Last Vitals:   Visit Vitals  BP (!) 152/67 (BP Location: Right arm, Patient Position: Sitting)   Pulse 75   Temp 37.1 °C (98.8 °F) (Skin)   Resp 18   Ht 5' 9" (1.753 m)   Wt 85.2 kg (187 lb 13.3 oz)   SpO2 100%   BMI 27.74 kg/m²       Post vital signs: stable    Level of consciousness: awake    Nausea/Vomiting: no nausea/no vomiting    Complications: none    Airway Patency: patent    Respiratory: unassisted    Cardiovascular: stable and blood pressure at baseline    Hydration: euvolemic  "
Malu Lima(Resident)

## 2017-10-02 ENCOUNTER — TELEPHONE (OUTPATIENT)
Dept: RADIATION ONCOLOGY | Facility: CLINIC | Age: 67
End: 2017-10-02

## 2017-10-03 NOTE — TELEPHONE ENCOUNTER
Wife called (10/2); stated that pt has decided to not continue radiation.  She said he had fever and chills on Saturday, but is feeling much better today.  Advised her that he did not receive any radiation, that he was only filmed.  She verbalized understanding, though stated he will keep his f/u with Dr. Carroll on Friday to discuss further.  Dr. Nath notified.

## 2017-10-05 DIAGNOSIS — I10 ESSENTIAL HYPERTENSION: ICD-10-CM

## 2017-10-05 RX ORDER — AMLODIPINE AND BENAZEPRIL HYDROCHLORIDE 5; 10 MG/1; MG/1
1 CAPSULE ORAL DAILY
Qty: 30 CAPSULE | Refills: 3 | Status: SHIPPED | OUTPATIENT
Start: 2017-10-05 | End: 2017-11-08 | Stop reason: SDUPTHER

## 2017-10-06 ENCOUNTER — INFUSION (OUTPATIENT)
Dept: INFUSION THERAPY | Facility: HOSPITAL | Age: 67
End: 2017-10-06
Attending: INTERNAL MEDICINE
Payer: MEDICARE

## 2017-10-06 ENCOUNTER — OFFICE VISIT (OUTPATIENT)
Dept: HEMATOLOGY/ONCOLOGY | Facility: CLINIC | Age: 67
End: 2017-10-06
Payer: MEDICARE

## 2017-10-06 VITALS
DIASTOLIC BLOOD PRESSURE: 72 MMHG | RESPIRATION RATE: 18 BRPM | WEIGHT: 185.63 LBS | HEART RATE: 75 BPM | BODY MASS INDEX: 26.57 KG/M2 | TEMPERATURE: 99 F | SYSTOLIC BLOOD PRESSURE: 114 MMHG | HEIGHT: 70 IN | OXYGEN SATURATION: 98 %

## 2017-10-06 VITALS
OXYGEN SATURATION: 98 % | HEART RATE: 65 BPM | RESPIRATION RATE: 16 BRPM | TEMPERATURE: 98 F | DIASTOLIC BLOOD PRESSURE: 68 MMHG | SYSTOLIC BLOOD PRESSURE: 115 MMHG

## 2017-10-06 DIAGNOSIS — C34.91 SQUAMOUS CELL CARCINOMA OF RIGHT LUNG: Primary | ICD-10-CM

## 2017-10-06 DIAGNOSIS — R91.8 ENDOBRONCHIAL MASS: ICD-10-CM

## 2017-10-06 PROCEDURE — S0028 INJECTION, FAMOTIDINE, 20 MG: HCPCS | Performed by: INTERNAL MEDICINE

## 2017-10-06 PROCEDURE — 96375 TX/PRO/DX INJ NEW DRUG ADDON: CPT

## 2017-10-06 PROCEDURE — 63600175 PHARM REV CODE 636 W HCPCS: Performed by: INTERNAL MEDICINE

## 2017-10-06 PROCEDURE — 96413 CHEMO IV INFUSION 1 HR: CPT

## 2017-10-06 PROCEDURE — 25000003 PHARM REV CODE 250: Performed by: INTERNAL MEDICINE

## 2017-10-06 PROCEDURE — 96367 TX/PROPH/DG ADDL SEQ IV INF: CPT

## 2017-10-06 PROCEDURE — 99999 PR PBB SHADOW E&M-EST. PATIENT-LVL III: CPT | Mod: PBBFAC,,, | Performed by: INTERNAL MEDICINE

## 2017-10-06 PROCEDURE — 96417 CHEMO IV INFUS EACH ADDL SEQ: CPT

## 2017-10-06 PROCEDURE — 99215 OFFICE O/P EST HI 40 MIN: CPT | Mod: S$GLB,,, | Performed by: INTERNAL MEDICINE

## 2017-10-06 RX ORDER — EPINEPHRINE 0.3 MG/.3ML
0.3 INJECTION SUBCUTANEOUS ONCE AS NEEDED
Status: CANCELLED | OUTPATIENT
Start: 2017-10-06 | End: 2017-10-06

## 2017-10-06 RX ORDER — ONDANSETRON 2 MG/ML
8 INJECTION INTRAMUSCULAR; INTRAVENOUS
Status: CANCELLED
Start: 2017-10-06 | End: 2017-10-06

## 2017-10-06 RX ORDER — DIPHENHYDRAMINE HYDROCHLORIDE 50 MG/ML
50 INJECTION INTRAMUSCULAR; INTRAVENOUS ONCE AS NEEDED
Status: CANCELLED | OUTPATIENT
Start: 2017-10-06 | End: 2017-10-06

## 2017-10-06 RX ORDER — SODIUM CHLORIDE 9 MG/ML
10 INJECTION, SOLUTION INTRAMUSCULAR; INTRAVENOUS; SUBCUTANEOUS
Status: DISCONTINUED | OUTPATIENT
Start: 2017-10-06 | End: 2017-10-06 | Stop reason: HOSPADM

## 2017-10-06 RX ORDER — FAMOTIDINE 20 MG/50ML
20 INJECTION, SOLUTION INTRAVENOUS
Status: COMPLETED | OUTPATIENT
Start: 2017-10-06 | End: 2017-10-06

## 2017-10-06 RX ORDER — SODIUM CHLORIDE 0.9 % (FLUSH) 0.9 %
10 SYRINGE (ML) INJECTION
Status: CANCELLED | OUTPATIENT
Start: 2017-10-06

## 2017-10-06 RX ORDER — HEPARIN 100 UNIT/ML
500 SYRINGE INTRAVENOUS
Status: DISCONTINUED | OUTPATIENT
Start: 2017-10-06 | End: 2017-10-06 | Stop reason: HOSPADM

## 2017-10-06 RX ORDER — ONDANSETRON 2 MG/ML
8 INJECTION INTRAMUSCULAR; INTRAVENOUS
Status: COMPLETED | OUTPATIENT
Start: 2017-10-06 | End: 2017-10-06

## 2017-10-06 RX ORDER — HEPARIN 100 UNIT/ML
500 SYRINGE INTRAVENOUS
Status: CANCELLED | OUTPATIENT
Start: 2017-10-06

## 2017-10-06 RX ORDER — FAMOTIDINE 20 MG/50ML
20 INJECTION, SOLUTION INTRAVENOUS
Status: CANCELLED
Start: 2017-10-06 | End: 2017-10-06

## 2017-10-06 RX ADMIN — DIPHENHYDRAMINE HYDROCHLORIDE 50 MG: 50 INJECTION, SOLUTION INTRAMUSCULAR; INTRAVENOUS at 10:10

## 2017-10-06 RX ADMIN — PACLITAXEL 90 MG: 6 INJECTION, SOLUTION, CONCENTRATE INTRAVENOUS at 11:10

## 2017-10-06 RX ADMIN — HEPARIN 500 UNITS: 100 SYRINGE at 12:10

## 2017-10-06 RX ADMIN — CARBOPLATIN 265 MG: 10 INJECTION, SOLUTION INTRAVENOUS at 12:10

## 2017-10-06 RX ADMIN — DEXAMETHASONE SODIUM PHOSPHATE 20 MG: 4 INJECTION, SOLUTION INTRA-ARTICULAR; INTRALESIONAL; INTRAMUSCULAR; INTRAVENOUS; SOFT TISSUE at 10:10

## 2017-10-06 RX ADMIN — ONDANSETRON 8 MG: 2 INJECTION INTRAMUSCULAR; INTRAVENOUS at 09:10

## 2017-10-06 RX ADMIN — FAMOTIDINE 20 MG: 20 INJECTION, SOLUTION INTRAVENOUS at 09:10

## 2017-10-06 NOTE — PATIENT INSTRUCTIONS
[facility]  [address]  [phone] phone     [phone] fax  Hours of Operation: Monday- Friday 8:00am- 5:00pm  After hours phone  [phone]  Hematology / Oncology Physicians on call      Dr. Glen Welch                        Please call with any concerns regarding your appointment today.

## 2017-10-06 NOTE — PROGRESS NOTES
Hematology/Oncology Office Note    CC:  Lung cancer    Referred by:  No ref. provider found    Diagnosis:  Squamous cell carcinoma the right lung    HPI:   The patient is a 67-year-old -American male who presents to the hematology oncology clinic today to discuss further evaluation and management recommendations for squamous cell carcinoma of the right lung.  The patient has been referred to me by Dr. Zackery Peña with pulmonary medicine.  I have reviewed all of the patient's clinical history available in the medical record and have utilized this in my evaluation and management recommendations today.  Today the patient reports that he has a chronic cough with clear sputum.  He has had this for about 3-4 months.  He denies any fevers, chills or night sweats.  He denies any loss of appetite or unintentional weight loss.  He denies any melena, hemoptysis, hematemesis, hematuria or hematochezia.  He denies any bowel or urinary complaints.  He denies any chest pain.  He does report dyspnea with exertion.  He reports occasional headaches.    I have reviewed and updated the HPI, ROS, PMHx, Social Hx, Family Hx and treatment history.    Today's visit:  Patient presents today very confused about treatment plan and radiation.  He has significant complaints related to radiation therapy, however, it does not appear that he is received radiation.    He reports that radiation caused him severe shoulder/chest pain.    Past Medical History:   Diagnosis Date    Borderline high blood pressure     Chronic low back pain     DDD (degenerative disc disease), lumbar 6/13/2017    H/O: Bell's palsy     H/O: Bell's palsy     Hyperlipidemia     Hypertension     Major depression     Sciatica     Squamous cell carcinoma of right lung     Tobacco abuse     Trouble in sleeping          Social History:  No tobacco, alcohol, or illicit drugs      Family History: family history includes Diabetes in his brother, brother, and  sister; Heart attack (age of onset: 87) in his father; Heart disease in his brother and mother; Hypertension in his mother; Stroke in his mother.        HPI  Review of Systems   Constitutional: Negative for activity change, appetite change, fatigue, fever and unexpected weight change.   HENT: Negative for congestion, drooling, ear discharge, facial swelling, nosebleeds, rhinorrhea, sinus pressure, sneezing and sore throat.    Eyes: Negative.    Respiratory: Negative for apnea, cough, choking, chest tightness and shortness of breath.    Cardiovascular: Positive for chest pain. Negative for palpitations and leg swelling.   Gastrointestinal: Negative for abdominal distention, abdominal pain, anal bleeding, blood in stool, constipation, diarrhea, nausea and vomiting.   Endocrine: Negative.    Genitourinary: Negative for decreased urine volume, difficulty urinating, frequency, genital sores, hematuria, testicular pain and urgency.   Musculoskeletal: Positive for arthralgias. Negative for back pain, gait problem, joint swelling, myalgias, neck pain and neck stiffness.        Chest and shoulder pain   Skin: Negative for color change, pallor, rash and wound.   Allergic/Immunologic: Negative.    Neurological: Negative for dizziness, tremors, syncope, speech difficulty, weakness, light-headedness, numbness and headaches.   Hematological: Negative for adenopathy. Does not bruise/bleed easily.   Psychiatric/Behavioral: Negative for agitation, confusion and hallucinations. The patient is not nervous/anxious and is not hyperactive.        Objective:      Physical Exam   Constitutional: He is oriented to person, place, and time. He appears well-developed and well-nourished. No distress.   HENT:   Head: Normocephalic and atraumatic.   Nose: Nose normal.   Mouth/Throat: Oropharynx is clear and moist. No oropharyngeal exudate.   Eyes: Conjunctivae and EOM are normal. Pupils are equal, round, and reactive to light. Right eye exhibits  no discharge. Left eye exhibits no discharge. No scleral icterus.   Neck: Normal range of motion. Neck supple. No JVD present. No tracheal deviation present. No thyromegaly present.   Cardiovascular: Normal rate and regular rhythm.  Exam reveals no gallop and no friction rub.    No murmur heard.  Pulmonary/Chest: Effort normal and breath sounds normal. No stridor. No respiratory distress. He has no wheezes. He has no rales. He exhibits no tenderness.   Abdominal: Soft. Bowel sounds are normal. He exhibits no distension and no mass. There is no tenderness. There is no rebound.   Musculoskeletal: Normal range of motion. He exhibits no edema or tenderness.   Lymphadenopathy:     He has no cervical adenopathy.   Neurological: He is alert and oriented to person, place, and time. No cranial nerve deficit. Coordination normal.   Skin: Skin is warm and dry. No rash noted. He is not diaphoretic.   Psychiatric: He has a normal mood and affect. Thought content normal.   Vitals reviewed.      Lab Results   Component Value Date    WBC 8.56 10/06/2017    HGB 11.5 (L) 10/06/2017    HCT 34.7 (L) 10/06/2017    MCV 78 (L) 10/06/2017     10/06/2017     Lab Results   Component Value Date    CREATININE 0.8 10/06/2017    BUN 7 (L) 10/06/2017     (L) 10/06/2017    K 4.9 10/06/2017     10/06/2017    CO2 28 10/06/2017     Lab Results   Component Value Date    ALT 15 10/06/2017    AST 11 10/06/2017    ALKPHOS 68 10/06/2017    BILITOT 0.4 10/06/2017       Assessment:       67-year-old gentleman with squamous cell carcinoma of the lung clinically stage III who has received weekly carboplatin/Taxol.  Patient has now decided to discontinue radiation therapy.  I have asked the patient to discuss decision with radiation oncology and his primary treating oncologist Dr. De Luna.  We will proceed with weekly carboplatin/Taxol as scheduled.    Stage III squamous cell carcinoma the right lung:  --Proceed with weekly  carboplatin/Taxol  --I have asked the patient to discuss radiation therapy with Dr. Diaz and primary oncologist Dr. De Luna

## 2017-10-06 NOTE — PLAN OF CARE
Problem: Patient Care Overview  Goal: Plan of Care Review  Outcome: Ongoing (interventions implemented as appropriate)  Pt stated, I have been a little weak after my treatment.

## 2017-10-13 ENCOUNTER — INFUSION (OUTPATIENT)
Dept: INFUSION THERAPY | Facility: HOSPITAL | Age: 67
End: 2017-10-13
Attending: INTERNAL MEDICINE
Payer: MEDICARE

## 2017-10-13 ENCOUNTER — OFFICE VISIT (OUTPATIENT)
Dept: HEMATOLOGY/ONCOLOGY | Facility: CLINIC | Age: 67
End: 2017-10-13
Payer: MEDICARE

## 2017-10-13 VITALS
TEMPERATURE: 99 F | SYSTOLIC BLOOD PRESSURE: 112 MMHG | OXYGEN SATURATION: 100 % | BODY MASS INDEX: 26.26 KG/M2 | WEIGHT: 183.44 LBS | DIASTOLIC BLOOD PRESSURE: 72 MMHG | HEART RATE: 86 BPM | HEIGHT: 70 IN

## 2017-10-13 VITALS — DIASTOLIC BLOOD PRESSURE: 66 MMHG | TEMPERATURE: 98 F | SYSTOLIC BLOOD PRESSURE: 127 MMHG | HEART RATE: 53 BPM

## 2017-10-13 DIAGNOSIS — C34.91 SQUAMOUS CELL CARCINOMA OF RIGHT LUNG: Primary | ICD-10-CM

## 2017-10-13 DIAGNOSIS — C34.91 SQUAMOUS CELL CARCINOMA OF RIGHT LUNG: Primary | Chronic | ICD-10-CM

## 2017-10-13 DIAGNOSIS — C77.1 CANCER OF INTRATHORACIC LYMPH NODES, SECONDARY: ICD-10-CM

## 2017-10-13 PROCEDURE — 96367 TX/PROPH/DG ADDL SEQ IV INF: CPT | Mod: PO

## 2017-10-13 PROCEDURE — S0028 INJECTION, FAMOTIDINE, 20 MG: HCPCS | Mod: PO | Performed by: INTERNAL MEDICINE

## 2017-10-13 PROCEDURE — 99215 OFFICE O/P EST HI 40 MIN: CPT | Mod: 25,S$GLB,, | Performed by: INTERNAL MEDICINE

## 2017-10-13 PROCEDURE — 99999 PR PBB SHADOW E&M-EST. PATIENT-LVL III: CPT | Mod: PBBFAC,,, | Performed by: INTERNAL MEDICINE

## 2017-10-13 PROCEDURE — 25000003 PHARM REV CODE 250: Mod: PO | Performed by: INTERNAL MEDICINE

## 2017-10-13 PROCEDURE — 96417 CHEMO IV INFUS EACH ADDL SEQ: CPT | Mod: PO

## 2017-10-13 PROCEDURE — 96413 CHEMO IV INFUSION 1 HR: CPT | Mod: PO

## 2017-10-13 PROCEDURE — 63600175 PHARM REV CODE 636 W HCPCS: Mod: PO | Performed by: INTERNAL MEDICINE

## 2017-10-13 RX ORDER — SODIUM CHLORIDE 0.9 % (FLUSH) 0.9 %
10 SYRINGE (ML) INJECTION
Status: CANCELLED | OUTPATIENT
Start: 2017-10-13

## 2017-10-13 RX ORDER — DIPHENHYDRAMINE HYDROCHLORIDE 50 MG/ML
50 INJECTION INTRAMUSCULAR; INTRAVENOUS ONCE AS NEEDED
Status: CANCELLED | OUTPATIENT
Start: 2017-10-13 | End: 2017-10-13

## 2017-10-13 RX ORDER — EPINEPHRINE 0.3 MG/.3ML
0.3 INJECTION SUBCUTANEOUS ONCE AS NEEDED
Status: CANCELLED | OUTPATIENT
Start: 2017-10-13 | End: 2017-10-13

## 2017-10-13 RX ORDER — HEPARIN 100 UNIT/ML
500 SYRINGE INTRAVENOUS
Status: DISCONTINUED | OUTPATIENT
Start: 2017-10-13 | End: 2017-10-13 | Stop reason: HOSPADM

## 2017-10-13 RX ORDER — SODIUM CHLORIDE 0.9 % (FLUSH) 0.9 %
10 SYRINGE (ML) INJECTION
Status: DISCONTINUED | OUTPATIENT
Start: 2017-10-13 | End: 2017-10-13 | Stop reason: HOSPADM

## 2017-10-13 RX ORDER — FAMOTIDINE 20 MG/50ML
20 INJECTION, SOLUTION INTRAVENOUS
Status: COMPLETED | OUTPATIENT
Start: 2017-10-13 | End: 2017-10-13

## 2017-10-13 RX ORDER — FAMOTIDINE 20 MG/50ML
20 INJECTION, SOLUTION INTRAVENOUS
Status: CANCELLED
Start: 2017-10-13 | End: 2017-10-13

## 2017-10-13 RX ORDER — HEPARIN 100 UNIT/ML
500 SYRINGE INTRAVENOUS
Status: CANCELLED | OUTPATIENT
Start: 2017-10-13

## 2017-10-13 RX ADMIN — DIPHENHYDRAMINE HYDROCHLORIDE 50 MG: 50 INJECTION INTRAMUSCULAR; INTRAVENOUS at 10:10

## 2017-10-13 RX ADMIN — HEPARIN 500 UNITS: 100 SYRINGE at 12:10

## 2017-10-13 RX ADMIN — CARBOPLATIN 265 MG: 10 INJECTION, SOLUTION INTRAVENOUS at 11:10

## 2017-10-13 RX ADMIN — FAMOTIDINE 20 MG: 20 INJECTION, SOLUTION INTRAVENOUS at 09:10

## 2017-10-13 RX ADMIN — PACLITAXEL 90 MG: 6 INJECTION, SOLUTION, CONCENTRATE INTRAVENOUS at 10:10

## 2017-10-13 RX ADMIN — SODIUM CHLORIDE: 9 INJECTION, SOLUTION INTRAVENOUS at 09:10

## 2017-10-13 RX ADMIN — DEXAMETHASONE SODIUM PHOSPHATE: 4 INJECTION, SOLUTION INTRA-ARTICULAR; INTRALESIONAL; INTRAMUSCULAR; INTRAVENOUS; SOFT TISSUE at 09:10

## 2017-10-13 NOTE — PROGRESS NOTES
Reason for visit: Squamous cell carcinoma of the right lung  Date of Diagnosis: August 22, 2017    HPI:   The patient is a 67-year-old -American male who presents to the hematology oncology clinic today to discuss further evaluation and management recommendations for squamous cell carcinoma of the right lung.  The patient has been referred to me by Dr. Zackery Peña with pulmonary medicine.  I have reviewed all of the patient's clinical history available in the medical record and have utilized this in my evaluation and management recommendations today.  Today the patient reports that he has a chronic cough with clear sputum.  He has had this for about 3-4 months.  He denies any fevers, chills or night sweats.  He denies any loss of appetite or unintentional weight loss.  He denies any melena, hemoptysis, hematemesis, hematuria or hematochezia.  He denies any bowel or urinary complaints.  He denies any chest pain.  He does report dyspnea with exertion.  He reports occasional headaches.    PAST MEDICAL HISTORY:   1.  Hypertension  2.  Dyslipidemia  3.  Bell's palsy  4.  Osteoarthritis/degenerative disc disease  5.  BPH  6.  Hearing impaired  7.  Vitamin D deficiency  8.  Depression    SURGICAL HISTORY:   1.  Hemorrhoidectomy  2.  Right neck mass excision which was benign    FAMILY HISTORY: He denies any immediate family members with cancer or bleeding/clotting disorders.    SOCIAL HISTORY: He reports of 40-pack-year smoking history and quit in August 2017.  He reports taking alcohol socially.  He has never used any recreational drugs.  He is medically disabled.  He is .    ALLERGIES: [NKDA]    MEDICATIONS: [Medcard has been reviewed and/or reconciled.]    REVIEW OF SYSTEMS:   GENERAL: [No fevers, chills or sweats. No fatigue, weight loss or loss of appetite.]  HEENT: [No blurred vision, tinnitus, nasal discharge, sorethroat or dysphagia.]  HEART: [No chest pain, palpitations or shortness of breath.]     LUNGS: [Reports cough. Denies hemoptysis or breathing problems.]  ABDOMEN: [No abdominal pain, nausea, vomiting, diarrhea, constipation or melena.]  GENITOURINARY: [No dysuria, bleeding or malodorous discharge.]  NEURO: [No headache, dizziness or vertigo.]  HEMATOLOGY: [No easy bruising, spontaneous bleeding or blood clots in the past].  MUSCULOSKELETAL: [No arthralgias, myalgias or bone pains.]  SKIN: [No rashes or skin lesions.]  PSYCHIATRY: [No depression. Reports anxiety.]    PHYSICAL EXAMINATION:   VS: Reviewed on nurse's notes.  APPEARANCE: The patient is a well-developed, well-nourished and well-groomed -American male who appears in no acute distress.    HEENT: No scleral icterus. Both external auditory canals clear. No oral ulcers, lesions. Throat clear  HEAD: No sinus tenderness.  NECK: Supple. No palpable lymphadenopathy. Thyroid non-tender, no palpable masses  CHEST: Scattered rales.  Scattered wheezes.  No rhonchi. Unlabored respirations.  CARDIOVASCULAR: Normal S1, S2. Normal rate. Regular rhythm.  ABDOMEN: Bowel sounds normal. No tenderness. No abdominal distention. No hepatomegaly. No splenomegaly.  LYMPHATIC: No palpable supraclavicular, axillary nodes  EXTREMITIES: No clubbing, cyanosis, edema  SKIN: No lesions. No petechiae. No ecchymoses. No induration or nodules  NEUROLOGIC: No focal findings. Alert & Oriented x 3. Mood appropriate to affect    LABS:   Reviewed    IMAGING:  Reviewed    IMPRESSION:  1.  Squamous cell carcinoma of the right lung    PLAN:  1.  I had a detailed discussion with the patient today with regard to the diagnosis, prognosis and treatment for his squamous cell carcinoma of the right lung.  2.  MRI of the brain done on August 25, 2017 does not show any evidence of metastatic disease .  I reviewed the results of his recent lab work with him in detail as well.  He expressed understanding.  3. Proceed with week 4 of carboplatin/Taxol today.  4.  The patient stated that  he had a lot of pain when he underwent simulation for XRT.  He reports that the pain has since resolved.  However he does not want to proceed with definitive radiation therapy at this time.  He understands risks/benefits including the possibility that we may not be able to cure his malignancy if we are unable to add definitive radiation therapy to his chemotherapy.  I have also offered him a referral to a different radiation oncology facility if he does not want to undergo radiation therapy at the [facility].  However the patient stated that he does not want to do this at this time.  He stated that he will let me know if he changes his mind.  The patient also reported his concerns with regard to something having possibly gone wrong with one of his chemotherapy infusions.  I have discussed with him that I have checked the treatment plan and everything seems to have been given accurately as ordered.  I will have our chemotherapy nurse supervisor look into his concerns as well.  He expressed understanding.  He is agreeable to continue his chemotherapy today.    Follow-up in 1 week with labs for week 5 of carboplatin/Taxol.  I will plan on obtaining PET/CT scan for reevaluation of his disease next week.  I will make treatment modifications with regard to his chemotherapy based on above and results of PET/CT.  He knows to call sooner if any new problems or questions.      Burt De Luna MD

## 2017-10-13 NOTE — PLAN OF CARE
Problem: Patient Care Overview  Goal: Plan of Care Review  Outcome: Ongoing (interventions implemented as appropriate)  Pt stated, I have been feeling good no problems

## 2017-10-13 NOTE — PATIENT INSTRUCTIONS
[facility]  [address]  [phone] phone     [phone] fax  Hours of Operation: Monday- Friday 8:00am- 5:00pm  After hours phone  [phone]  Hematology / Oncology Physicians on call      Dr. Glen Welch                        Please call with any concerns regarding your appointment today.

## 2017-10-17 ENCOUNTER — LAB VISIT (OUTPATIENT)
Dept: LAB | Facility: HOSPITAL | Age: 67
End: 2017-10-17
Attending: INTERNAL MEDICINE
Payer: MEDICARE

## 2017-10-17 ENCOUNTER — TELEPHONE (OUTPATIENT)
Dept: RADIOLOGY | Facility: HOSPITAL | Age: 67
End: 2017-10-17

## 2017-10-17 ENCOUNTER — OFFICE VISIT (OUTPATIENT)
Dept: NEPHROLOGY | Facility: CLINIC | Age: 67
End: 2017-10-17
Payer: MEDICARE

## 2017-10-17 VITALS
WEIGHT: 186.31 LBS | HEART RATE: 74 BPM | HEIGHT: 70 IN | DIASTOLIC BLOOD PRESSURE: 60 MMHG | BODY MASS INDEX: 26.67 KG/M2 | SYSTOLIC BLOOD PRESSURE: 140 MMHG

## 2017-10-17 DIAGNOSIS — E87.1 HYPONATREMIA: ICD-10-CM

## 2017-10-17 DIAGNOSIS — E87.1 HYPONATREMIA: Primary | ICD-10-CM

## 2017-10-17 LAB
BILIRUB UR QL STRIP: NEGATIVE
CLARITY UR REFRACT.AUTO: CLEAR
COLOR UR AUTO: YELLOW
GLUCOSE UR QL STRIP: NEGATIVE
HGB UR QL STRIP: NEGATIVE
KETONES UR QL STRIP: NEGATIVE
LEUKOCYTE ESTERASE UR QL STRIP: NEGATIVE
NITRITE UR QL STRIP: NEGATIVE
PH UR STRIP: 7 [PH] (ref 5–8)
PROT UR QL STRIP: NEGATIVE
SODIUM UR-SCNC: 106 MMOL/L
SP GR UR STRIP: 1 (ref 1–1.03)
URN SPEC COLLECT METH UR: NORMAL
UROBILINOGEN UR STRIP-ACNC: NEGATIVE EU/DL

## 2017-10-17 PROCEDURE — 81003 URINALYSIS AUTO W/O SCOPE: CPT

## 2017-10-17 PROCEDURE — 84300 ASSAY OF URINE SODIUM: CPT

## 2017-10-17 PROCEDURE — 99999 PR PBB SHADOW E&M-EST. PATIENT-LVL III: CPT | Mod: PBBFAC,,, | Performed by: INTERNAL MEDICINE

## 2017-10-17 PROCEDURE — 99204 OFFICE O/P NEW MOD 45 MIN: CPT | Mod: S$GLB,,, | Performed by: INTERNAL MEDICINE

## 2017-10-17 NOTE — PROGRESS NOTES
Subjective:       Patient ID: Jesse Chow is a 67 y.o.   male who presents for new evaluation of Hyponatremia     Sunday Zhu MD      HPI: Jesse Chow is a pleasant 67-year-old -American gentleman seen in office today in consultation for hyponatremia on referral from his oncologist.  Patient was diagnosed with squamous cell carcinoma of the lung about 3 months ago and is currently on chemotherapy for the same.  His serum sodium was in 125 to 130 range about a month ago, this was likely due to volume depletion, after improving his fluid intake his serum sodium is improved in his recent labs , more recent sodium is 134, today's accompanied office with his wife, pertinent provider notes reviewed,           Past Medical History:   Diagnosis Date    Borderline high blood pressure     Chronic low back pain     DDD (degenerative disc disease), lumbar 6/13/2017    H/O: Bell's palsy     H/O: Bell's palsy     Hyperlipidemia     Hypertension     Major depression     Sciatica     Squamous cell carcinoma of right lung     Tobacco abuse     Trouble in sleeping        Past Surgical History:   Procedure Laterality Date    HEMORRHOID SURGERY      NECK MASS EXCISION Right     cyst       Review of patient's allergies indicates: No Known Allergies    Current Outpatient Prescriptions on File Prior to Visit   Medication Sig Dispense Refill    albuterol (PROAIR HFA) 90 mcg/actuation inhaler INHALE 2 PUFFS INTO THE LUNGS EVERY 6 (SIX) HOURS AS NEEDED FOR WHEEZING. 16 g 3    amlodipine-benazepril 5-10 mg (LOTREL) 5-10 mg per capsule TAKE 1 CAPSULE BY MOUTH ONCE DAILY. 30 capsule 3    ondansetron (ZOFRAN-ODT) 8 MG TbDL Take 1 tablet (8 mg total) by mouth every 12 (twelve) hours as needed. 30 tablet 1    prochlorperazine (COMPAZINE) 5 MG tablet Take 1 tablet (5 mg total) by mouth every 6 (six) hours as needed for Nausea. 30 tablet 1    tamsulosin (FLOMAX) 0.4 mg Cp24 Take 1  capsule (0.4 mg total) by mouth once daily. 30 capsule 5     No current facility-administered medications on file prior to visit.            FH : denies FH of kidney disease ,     SH : quit smoking in 7/2017 after diagnosis lung cancer, retired  , has 4 biological children,       Review of Systems   Constitutional: Negative for activity change and appetite change.   HENT: Negative for congestion and facial swelling.    Eyes: Negative for pain, discharge and redness.   Respiratory: Negative for apnea, cough and chest tightness.    Cardiovascular: Negative for chest pain, palpitations and leg swelling.   Gastrointestinal: Negative for abdominal distention.   Genitourinary: Negative for difficulty urinating, dysuria and frequency.   Musculoskeletal: Positive for arthralgias. Negative for neck pain and neck stiffness.   Skin: Negative for color change, rash and wound.   Neurological: Negative for dizziness, weakness and numbness.   Psychiatric/Behavioral: Negative for sleep disturbance.   All other systems reviewed and are negative.    :              Objective:         Vitals:    10/17/17 1511   BP: (!) 140/60   Pulse: 74       Respiratory rate 20, afebrile, weight 186 pounds    Physical Exam   Constitutional: He is oriented to person, place, and time. He appears well-developed and well-nourished. No distress.   HENT:   Head: Normocephalic and atraumatic.   Eyes: Pupils are equal, round, and reactive to light.   Neck: Normal range of motion. Neck supple. No tracheal deviation present. No thyromegaly present.   Cardiovascular: Normal rate, regular rhythm, normal heart sounds and intact distal pulses.  Exam reveals no gallop and no friction rub.    No murmur heard.  Pulmonary/Chest: Effort normal and breath sounds normal. He has no wheezes. He has no rales.   Abdominal: Soft. He exhibits no mass. There is no tenderness. There is no rebound and no guarding.   Musculoskeletal: Normal range of motion. He exhibits  no edema.   Lymphadenopathy:     He has no cervical adenopathy.   Neurological: He is alert and oriented to person, place, and time.   Skin: Skin is warm. No rash noted. He is not diaphoretic. No erythema.   Nursing note and vitals reviewed.    :              Labs:    Lab Results   Component Value Date    CREATININE 0.8 10/13/2017    BUN 5 (L) 10/13/2017     (L) 10/13/2017    K 4.4 10/13/2017     10/13/2017    CO2 25 10/13/2017       Lab Results   Component Value Date    WBC 7.12 10/13/2017    HGB 11.7 (L) 10/13/2017    HCT 35.5 (L) 10/13/2017    MCV 77 (L) 10/13/2017     10/13/2017       Lab Results   Component Value Date    CALCIUM 9.4 10/13/2017       Lab Results   Component Value Date    ALBUMIN 3.3 (L) 10/13/2017       Lab Results   Component Value Date    URICACID 4.2 09/22/2017       Impression and Plan :  67-year-old -American gentleman seen in office today in consultation for following medical problems    1.  Hyponatremia - most likely due to volume depletion, he has been drinking enough fluids in the recent past in serum sodium is stable around 135, patient was recently diagnosed with squamous cell carcinoma of the lung, hyponatremia is more common with small cell carcinoma of the lung, but other lung cancers can also cause hyponatremia.  The mechanism is usually due to SIADH.  Discussed adequate fluid intake.    2.  Hypertension  - blood pressure is controlled on current medications, will avoid thiazide diuretics due to hyponatremia    3.  Stable renal function with a serum creatinine less than 1 mg/dL    4.  Squamous cell carcinoma of the lung - currently following with oncology Department.    Return to clinic in 3 months, more than 45 minutes of face-to-face time was spent with the patient and his wife discussing labs and plan of care.  Thank you for involving us in the care of this pleasant gentleman.  Patient will be having regular lab work done due to his ongoing  chemotherapy, will request his oncologist to keep us informed if his serum sodium levels drop again as pathophysiology of his hyponatremia may change ,     Sincerely,    Rafael Barrera MD

## 2017-10-18 ENCOUNTER — TELEPHONE (OUTPATIENT)
Dept: HEMATOLOGY/ONCOLOGY | Facility: CLINIC | Age: 67
End: 2017-10-18

## 2017-10-18 ENCOUNTER — HOSPITAL ENCOUNTER (OUTPATIENT)
Dept: RADIOLOGY | Facility: HOSPITAL | Age: 67
Discharge: HOME | End: 2017-10-18
Attending: INTERNAL MEDICINE
Payer: MEDICARE

## 2017-10-18 DIAGNOSIS — C77.1 CANCER OF INTRATHORACIC LYMPH NODES, SECONDARY: ICD-10-CM

## 2017-10-18 DIAGNOSIS — C34.91 SQUAMOUS CELL CARCINOMA OF RIGHT LUNG: Chronic | ICD-10-CM

## 2017-10-18 PROCEDURE — A9552 F18 FDG: HCPCS

## 2017-10-18 PROCEDURE — 78815 PET IMAGE W/CT SKULL-THIGH: CPT | Mod: 26,PS,, | Performed by: RADIOLOGY

## 2017-10-18 NOTE — TELEPHONE ENCOUNTER
----- Message from Burt De Luna MD sent at 10/18/2017 10:11 AM CDT -----  Pet/ct shows interval improvement with ongoing treatment. Will discuss in detail at follow up. Thank you.

## 2017-10-20 ENCOUNTER — INFUSION (OUTPATIENT)
Dept: INFUSION THERAPY | Facility: HOSPITAL | Age: 67
End: 2017-10-20
Attending: INTERNAL MEDICINE
Payer: MEDICARE

## 2017-10-20 ENCOUNTER — SOCIAL WORK (OUTPATIENT)
Dept: HEMATOLOGY/ONCOLOGY | Facility: CLINIC | Age: 67
End: 2017-10-20

## 2017-10-20 ENCOUNTER — OFFICE VISIT (OUTPATIENT)
Dept: HEMATOLOGY/ONCOLOGY | Facility: CLINIC | Age: 67
End: 2017-10-20
Payer: MEDICARE

## 2017-10-20 VITALS
BODY MASS INDEX: 26.51 KG/M2 | OXYGEN SATURATION: 98 % | TEMPERATURE: 98 F | RESPIRATION RATE: 16 BRPM | HEIGHT: 70 IN | HEART RATE: 63 BPM | WEIGHT: 185.19 LBS | DIASTOLIC BLOOD PRESSURE: 65 MMHG | SYSTOLIC BLOOD PRESSURE: 109 MMHG

## 2017-10-20 VITALS
HEIGHT: 70 IN | DIASTOLIC BLOOD PRESSURE: 70 MMHG | SYSTOLIC BLOOD PRESSURE: 120 MMHG | BODY MASS INDEX: 26.51 KG/M2 | OXYGEN SATURATION: 96 % | HEART RATE: 83 BPM | WEIGHT: 185.19 LBS | RESPIRATION RATE: 18 BRPM | TEMPERATURE: 99 F

## 2017-10-20 DIAGNOSIS — C34.91 SQUAMOUS CELL CARCINOMA OF RIGHT LUNG: Primary | Chronic | ICD-10-CM

## 2017-10-20 DIAGNOSIS — N48.9 ABNORMALITY OF PENIS: ICD-10-CM

## 2017-10-20 DIAGNOSIS — C34.91 SQUAMOUS CELL CARCINOMA OF RIGHT LUNG: Primary | ICD-10-CM

## 2017-10-20 PROCEDURE — 96417 CHEMO IV INFUS EACH ADDL SEQ: CPT

## 2017-10-20 PROCEDURE — 96367 TX/PROPH/DG ADDL SEQ IV INF: CPT

## 2017-10-20 PROCEDURE — S0028 INJECTION, FAMOTIDINE, 20 MG: HCPCS | Performed by: INTERNAL MEDICINE

## 2017-10-20 PROCEDURE — 99999 PR PBB SHADOW E&M-EST. PATIENT-LVL III: CPT | Mod: PBBFAC,,, | Performed by: INTERNAL MEDICINE

## 2017-10-20 PROCEDURE — 25000003 PHARM REV CODE 250: Performed by: INTERNAL MEDICINE

## 2017-10-20 PROCEDURE — 99215 OFFICE O/P EST HI 40 MIN: CPT | Mod: 25,S$GLB,, | Performed by: INTERNAL MEDICINE

## 2017-10-20 PROCEDURE — 63600175 PHARM REV CODE 636 W HCPCS: Performed by: INTERNAL MEDICINE

## 2017-10-20 PROCEDURE — 96413 CHEMO IV INFUSION 1 HR: CPT

## 2017-10-20 PROCEDURE — A4216 STERILE WATER/SALINE, 10 ML: HCPCS | Performed by: INTERNAL MEDICINE

## 2017-10-20 RX ORDER — SODIUM CHLORIDE 9 MG/ML
INJECTION, SOLUTION INTRAVENOUS ONCE
Status: COMPLETED | OUTPATIENT
Start: 2017-10-20 | End: 2017-10-20

## 2017-10-20 RX ORDER — SODIUM CHLORIDE 9 MG/ML
10 INJECTION, SOLUTION INTRAMUSCULAR; INTRAVENOUS; SUBCUTANEOUS
Status: DISCONTINUED | OUTPATIENT
Start: 2017-10-20 | End: 2017-10-22 | Stop reason: HOSPADM

## 2017-10-20 RX ORDER — HEPARIN 100 UNIT/ML
500 SYRINGE INTRAVENOUS
Status: DISCONTINUED | OUTPATIENT
Start: 2017-10-20 | End: 2017-10-22 | Stop reason: HOSPADM

## 2017-10-20 RX ORDER — EPINEPHRINE 0.3 MG/.3ML
0.3 INJECTION SUBCUTANEOUS ONCE AS NEEDED
Status: CANCELLED | OUTPATIENT
Start: 2017-10-20 | End: 2017-10-20

## 2017-10-20 RX ORDER — SODIUM CHLORIDE 0.9 % (FLUSH) 0.9 %
10 SYRINGE (ML) INJECTION
Status: CANCELLED | OUTPATIENT
Start: 2017-10-20

## 2017-10-20 RX ORDER — FAMOTIDINE 20 MG/50ML
20 INJECTION, SOLUTION INTRAVENOUS
Status: COMPLETED | OUTPATIENT
Start: 2017-10-20 | End: 2017-10-20

## 2017-10-20 RX ORDER — DIPHENHYDRAMINE HYDROCHLORIDE 50 MG/ML
50 INJECTION INTRAMUSCULAR; INTRAVENOUS ONCE AS NEEDED
Status: CANCELLED | OUTPATIENT
Start: 2017-10-20 | End: 2017-10-20

## 2017-10-20 RX ORDER — FAMOTIDINE 20 MG/50ML
20 INJECTION, SOLUTION INTRAVENOUS
Status: CANCELLED
Start: 2017-10-20 | End: 2017-10-20

## 2017-10-20 RX ORDER — HEPARIN 100 UNIT/ML
500 SYRINGE INTRAVENOUS
Status: CANCELLED | OUTPATIENT
Start: 2017-10-20

## 2017-10-20 RX ADMIN — DIPHENHYDRAMINE HYDROCHLORIDE 50 MG: 50 INJECTION, SOLUTION INTRAMUSCULAR; INTRAVENOUS at 10:10

## 2017-10-20 RX ADMIN — CARBOPLATIN 295 MG: 10 INJECTION, SOLUTION INTRAVENOUS at 01:10

## 2017-10-20 RX ADMIN — SODIUM CHLORIDE 10 ML: 9 INJECTION, SOLUTION INTRAMUSCULAR; INTRAVENOUS; SUBCUTANEOUS at 09:10

## 2017-10-20 RX ADMIN — HEPARIN 500 UNITS: 100 SYRINGE at 01:10

## 2017-10-20 RX ADMIN — SODIUM CHLORIDE: 0.9 INJECTION, SOLUTION INTRAVENOUS at 01:10

## 2017-10-20 RX ADMIN — DEXAMETHASONE SODIUM PHOSPHATE: 4 INJECTION, SOLUTION INTRA-ARTICULAR; INTRALESIONAL; INTRAMUSCULAR; INTRAVENOUS; SOFT TISSUE at 09:10

## 2017-10-20 RX ADMIN — PACLITAXEL 90 MG: 6 INJECTION, SOLUTION, CONCENTRATE INTRAVENOUS at 11:10

## 2017-10-20 RX ADMIN — FAMOTIDINE 20 MG: 20 INJECTION, SOLUTION INTRAVENOUS at 10:10

## 2017-10-20 RX ADMIN — SODIUM CHLORIDE: 900 INJECTION, SOLUTION INTRAVENOUS at 09:10

## 2017-10-20 NOTE — PATIENT INSTRUCTIONS
[facility]  [address]  [address] [address]  [phone] phone     [phone] fax  Hours of Operation: Monday- Friday 8:00am- 5:00pm  After hours phone  [phone]  Hematology / Oncology Physicians on call      Dr. Glen Welch                        Please call with any concerns regarding your appointment today.      HOME CARE AFTER CHEMOTHERAPY   Meals   Many patients feel sick and lose their appetites during treatment. Eat small meals several times a day. Choose bland foods with little taste or smell if you have problems with nausea. Be sure to cook all food thoroughly. This kills bacteria and helps you avoid intestinal infection. Soft foods are easier to swallow and digest.   Activity   Exercise keeps you strong and keeps your heart and lungs active. Talk to your doctor about an appropriate exercise program for you.   Skin Care   To prevent a skin infection, bathe or shower once a day. Use a moisturizing soap and wash with warm water. Avoid very hot or cold water. Chemotherapy can make your skin dry . Apply moisturizing lotion to help relieve dry skin. Some drugs used in high doses can cause slight burns to appear (like sunburn). Ask for a special cream to help relieve the burn and protect your skin.   Prevent Mouth Sores   During chemotherapy, many people get mouth sores. Do the following to help prevent mouth sores or to ease discomfort.   Brush your teeth with a soft-bristle toothbrush after every meal.  Don't use dental floss if your platelet count is below 50,000. Your doctor or nurse will tell you if this is the case.  Use an oral swab or special soft toothbrush if your gums bleed during regular brushing.  Use mouthwash as directed. If you can't tolerate commercial mouthwash, use salt and baking soda to clean your mouth. Mix 1 teaspoon of salt and 1 teaspoon of baking soda into a glass of water. Swish and spit.  Call your  doctor or return to this facility if you develop any of the following:   Sore throat   White patches in the mouth or throat   Fever of 100.4ºF (38ºC) or higher, or as directed by your healthcare provider  © 3970-9893 [employer], [address] [place] [address], [place] [address]. All rights reserved. This information is not intended as a substitute for professional medical care. Always follow your healthcare professional's       WAYS TO HELP PREVENT INFECTION         WASH YOUR HANDS OFTEN DURING THE DAY, ESPECIALLY BEFORE YOU EAT, AFTER USING THE BATHROOM, AND AFTER TOUCHING ANIMALS     STAY AWAY FROM PEOPLE WHO HAVE ILLNESSES YOU CAN CATCH; SUCH AS COLDS, FLU, CHICKEN POX     TRY TO AVOID CROWDS     STAY AWAY FROM CHILDREN WHO RECENTLY HAVE RECEIVED LIVE VIRUS VACCINES     MAINTAIN GOOD MOUTH CARE     DO NOT SQUEEZE OR SCRATCH PIMPLES     CLEAN CUTS & SCRAPES RIGHT AWAY AND DAILY UNTIL HEALED WITH WARM WATER, SOAP & AN ANTISEPTIC     AVOID CONTACT WITH LITTER BOXES, BIRD CAGES, & FISH TANKS     AVOID STANDING WATER, IE., BIRD BATHS, FLOWER POTS/VASES, OR HUMIDIFIERS     WEAR GLOVES WHEN GARDENING OR CLEANING UP AFTER OTHERS, ESPECIALLY BABIES & SMALL CHILDREN     DO NOT EAT RAW FISH, SEAFOOD, MEAT, OR EGGS    YOU HAVE STARTED ON CHEMOTHERAPY. IF YOU EXPERIENCE ANY OF THE FOLLOWING PROBLEMS, CALL THE OFFICE IMMEDIATELY.    *FEVER .0 OR GREATER    *CHILLS, ESPECIALLY SHAKING CHILLS, OR SWEATING    *A SEVERE COUGH OR SORE THROAT, OR SINUS PAIN/     PRESSURE    *REDNESS, SWELLING, OR TENDERNESS AROUND A WOUND,     SORE, PIMPLE, RECTAL AREA, OR IV SITE    *SORES OR ULCERS IN THE MOUTH    *BLISTERS ON THE LIPS OR SKIN    *FREQUENT URGENCY TO URINATE OR A BURNING FEELING   WHEN YOU URINATE    *BLOOD IN THE URINE OR STOOL    *ANY UNEXPLAINED BRUISING OR PROLONGED BLEEDING,     (NOSEBLEEDS OR BLEEDING GUMS)    *LOOSE BOWEL MOVEMENTS THAT DO NOT RESPOND TO     IMODIUM OR MORE THAN THREE TIMES A  DAY    *VOMITING UNRESPONSIVE TO ANTINAUSEA MEDICINE    *ANY UNUSUAL PHYSICAL SYMPTOMS THAT BEGAN AFTER     CHEMOTHERAPY    DURING WEEKDAYS, CALL AND ASK TO SPEAK DIRECTLY TO A NURSE.  AT OTHER TIMES, CALL THE OFFICE PHONE NUMBER; THE ANSWERING SERVICE WILL CONTACT THE ON-CALL PHYSICIAN.  SOMEONE IS AVAILABLE 24 HOURS A DAY, SEVEN DAYS A WEEK.    FALL PREVENTION   Falls often occur due to slipping, tripping or losing your balance. Here are ways to reduce your risk of falling again.   Was there anything that caused your fall that can be fixed, removed or replaced?   Make your home safe by keeping walkways clear of objects you may trip over.   Use non-slip pads under rugs.   Do not walk in poorly lit areas.   Do not stand on chairs or wobbly ladders.   Use caution when reaching overhead or looking upward. This position can cause a loss of balance.   Be sure your shoes fit properly, have non-slip bottoms and are in good condition.   Be cautious when going up and down stairs, curbs, and when walking on uneven sidewalks.   If your balance is poor, consider using a cane or walker.   If your fall was related to alcohol use, stop or limit alcohol intake.   If your fall was related to use of sleeping medicines, talk to your doctor about this. You may need to reduce your dosage at bedtime if you awaken during the night to go to the bathroom.   To reduce the need for nighttime bathroom trips:   Avoid drinking fluids for several hours before going to bed   Empty your bladder before going to bed   Men can keep a urinal at the bedside   © 7482-1273 [employer] [facility], [address] [place] [address], [place] [address]. All rights reserved. This information is not intended as a substitute for professional medical care. Always follow your healthcare professional's instructions.

## 2017-10-20 NOTE — PLAN OF CARE
Problem: Patient Care Overview  Goal: Plan of Care Review  Outcome: Ongoing (interventions implemented as appropriate)  Pt states feeling well, except neuropathy in feet.

## 2017-10-20 NOTE — PROGRESS NOTES
Reason for visit: Squamous cell carcinoma of the right lung  Date of Diagnosis: August 22, 2017    HPI:   The patient is a 67-year-old -American male who presents to the hematology oncology clinic today to discuss further evaluation and management recommendations for squamous cell carcinoma of the right lung.  The patient has been referred to me by Dr. Zackery Peña with pulmonary medicine.  I have reviewed all of the patient's clinical history available in the medical record and have utilized this in my evaluation and management recommendations today.  Today the patient reports that he has a chronic cough with clear sputum.  He has had this for about 3-4 months.  He denies any fevers, chills or night sweats.  He denies any loss of appetite or unintentional weight loss.  He denies any melena, hemoptysis, hematemesis, hematuria or hematochezia.  He denies any bowel or urinary complaints.  He denies any chest pain.  He does report dyspnea with exertion.  He reports occasional headaches.  He states that he has noticed discoloration of the glans penis.    PAST MEDICAL HISTORY:   1.  Hypertension  2.  Dyslipidemia  3.  Bell's palsy  4.  Osteoarthritis/degenerative disc disease  5.  BPH  6.  Hearing impaired  7.  Vitamin D deficiency  8.  Depression    SURGICAL HISTORY:   1.  Hemorrhoidectomy  2.  Right neck mass excision which was benign    FAMILY HISTORY: He denies any immediate family members with cancer or bleeding/clotting disorders.    SOCIAL HISTORY: He reports of 40-pack-year smoking history and quit in August 2017.  He reports taking alcohol socially.  He has never used any recreational drugs.  He is medically disabled.  He is .    ALLERGIES: [NKDA]    MEDICATIONS: [Medcard has been reviewed and/or reconciled.]    REVIEW OF SYSTEMS:   GENERAL: [No fevers, chills or sweats. No fatigue, weight loss or loss of appetite.]  HEENT: [No blurred vision, tinnitus, nasal discharge, sorethroat or  dysphagia.]  HEART: [No chest pain, palpitations or shortness of breath.]    LUNGS: [Reports cough. Denies hemoptysis or breathing problems.]  ABDOMEN: [No abdominal pain, nausea, vomiting, diarrhea, constipation or melena.]  GENITOURINARY: [No dysuria, bleeding or malodorous discharge.]  NEURO: [No headache, dizziness or vertigo.]  HEMATOLOGY: [No easy bruising, spontaneous bleeding or blood clots in the past].  MUSCULOSKELETAL: [No arthralgias, myalgias or bone pains.]  SKIN: [No rashes or skin lesions.]  PSYCHIATRY: [No depression. Reports anxiety.]    PHYSICAL EXAMINATION:   VS: Reviewed on nurse's notes.  APPEARANCE: The patient is a well-developed, well-nourished and well-groomed -American male who appears in no acute distress.    HEENT: No scleral icterus. Both external auditory canals clear. No oral ulcers, lesions. Throat clear  HEAD: No sinus tenderness.  NECK: Supple. No palpable lymphadenopathy. Thyroid non-tender, no palpable masses  CHEST: Scattered rales.  Scattered wheezes.  No rhonchi. Unlabored respirations.  CARDIOVASCULAR: Normal S1, S2. Normal rate. Regular rhythm.  ABDOMEN: Bowel sounds normal. No tenderness. No abdominal distention. No hepatomegaly. No splenomegaly.  : The patient has subtle discoloration of the glans penis.  He denies any associated tenderness/pain or urinary symptoms associated with this.  LYMPHATIC: No palpable supraclavicular, axillary nodes  EXTREMITIES: No clubbing, cyanosis, edema  SKIN: No lesions. No petechiae. No ecchymoses. No induration or nodules  NEUROLOGIC: No focal findings. Alert & Oriented x 3. Mood appropriate to affect    LABS:   Reviewed    IMAGING:  Reviewed    IMPRESSION:  1.  Squamous cell carcinoma of the right lung    PLAN:  1.  I had a detailed discussion with the patient today with regard to the diagnosis, prognosis and treatment for his squamous cell carcinoma of the right lung.  2.  MRI of the brain done on August 25, 2017 does not show  any evidence of metastatic disease .  I reviewed the results of his recent lab work with him in detail as well.  He expressed understanding.  3. Proceed with week 5 of carboplatin/Taxol today.  4.  The patient stated that he had a lot of pain when he underwent simulation for XRT.  He reports that the pain has since resolved.  However he does not want to proceed with definitive radiation therapy at this time.  He understands risks/benefits including the possibility that we may not be able to cure his malignancy if we are unable to add definitive radiation therapy to his chemotherapy.  I have also offered him a referral to a different radiation oncology facility if he does not want to undergo radiation therapy at the [facility].  However the patient stated that he does not want to do this at this time.  He stated that he will let me know if he changes his mind.  The patient also reported his concerns with regard to something having possibly gone wrong with one of his chemotherapy infusions.  I have discussed with him that I have checked the treatment plan and everything seems to have been given accurately as ordered.  I did have our chemotherapy nurse supervisor look into his concerns as well.  He expressed understanding.  He is agreeable to continue his chemotherapy.  5.  Results of PET/CT scan from October 18, 2017 shows interval improvement in disease burden.  I have discussed the option of increasing his dose of Taxol to 80 mg/m² as he has decided not to get radiation at this time.  He understands that at this time the focus is essentially disease control and not cure based on his decision not to proceed with radiation therapy.  However at this time the patient would like to keep his dose of Taxol at 45 mg/m² and will let me know if he changes his mind with regard to this.  He understands risks/benefits.  6.  Urology consult for further evaluation with regard to glans penis discoloration.    Follow-up  in 1 week with labs for week 5 of carboplatin/Taxol.  I will plan on obtaining PET/CT scan for reevaluation of his disease next week.  I will make treatment modifications with regard to his chemotherapy based on above and results of PET/CT.  He knows to call sooner if any new problems or questions.      Burt De Luna MD

## 2017-10-20 NOTE — PROGRESS NOTES
MSW intern met with pt as a follow-up from their last encounter. Pt stated that he was doing well and feeling good. Pt stated that he does not have any complaints. Pt stated that he is pleased with his chemo treatment, but decided to stop doing radiation because of his last radiation treatment. Pt stated that he felt his heart got really tight and needed assistance with sitting on the table. Pt stated that he does not know the true cause but he did make the staff and doctors aware of it. Pt stated that he and his Dr are going to consider more chemo treatments in place of radiation. Pt stated that he has blamed himself for the cancer because of signs from his granddaughter that he should have quit smoking a long time ago. Intern and pt together created solutions to help him eliminate the self-blame. Pt stated that he finds himself angry but is able to work through his anger by focusing his mind on the positives. Pt and intern discussed the benefits on accepting the behaviors in the past. Pt informed intern that him and his wife work together to make informed decisions so there are not any immediate needs. F/u as needs arise

## 2017-10-27 ENCOUNTER — TELEPHONE (OUTPATIENT)
Dept: INFUSION THERAPY | Facility: HOSPITAL | Age: 67
End: 2017-10-27

## 2017-10-30 ENCOUNTER — OFFICE VISIT (OUTPATIENT)
Dept: HEMATOLOGY/ONCOLOGY | Facility: CLINIC | Age: 67
End: 2017-10-30
Payer: MEDICARE

## 2017-10-30 ENCOUNTER — LAB VISIT (OUTPATIENT)
Dept: LAB | Facility: HOSPITAL | Age: 67
End: 2017-10-30
Attending: INTERNAL MEDICINE
Payer: MEDICARE

## 2017-10-30 ENCOUNTER — INFUSION (OUTPATIENT)
Dept: INFUSION THERAPY | Facility: HOSPITAL | Age: 67
End: 2017-10-30
Attending: INTERNAL MEDICINE
Payer: MEDICARE

## 2017-10-30 VITALS
WEIGHT: 188.5 LBS | DIASTOLIC BLOOD PRESSURE: 80 MMHG | OXYGEN SATURATION: 99 % | BODY MASS INDEX: 26.99 KG/M2 | RESPIRATION RATE: 16 BRPM | HEIGHT: 70 IN | SYSTOLIC BLOOD PRESSURE: 133 MMHG | TEMPERATURE: 98 F | HEART RATE: 60 BPM

## 2017-10-30 VITALS
DIASTOLIC BLOOD PRESSURE: 61 MMHG | BODY MASS INDEX: 26.99 KG/M2 | OXYGEN SATURATION: 98 % | SYSTOLIC BLOOD PRESSURE: 125 MMHG | TEMPERATURE: 99 F | WEIGHT: 188.5 LBS | HEIGHT: 70 IN | HEART RATE: 61 BPM

## 2017-10-30 DIAGNOSIS — C34.91 SQUAMOUS CELL CARCINOMA OF RIGHT LUNG: Primary | Chronic | ICD-10-CM

## 2017-10-30 DIAGNOSIS — C34.91 SQUAMOUS CELL CARCINOMA OF RIGHT LUNG: Primary | ICD-10-CM

## 2017-10-30 DIAGNOSIS — C34.91 SQUAMOUS CELL CARCINOMA OF RIGHT LUNG: Chronic | ICD-10-CM

## 2017-10-30 LAB
ALBUMIN SERPL BCP-MCNC: 3.3 G/DL
ALP SERPL-CCNC: 85 U/L
ALT SERPL W/O P-5'-P-CCNC: 14 U/L
ANION GAP SERPL CALC-SCNC: 4 MMOL/L
AST SERPL-CCNC: 14 U/L
BASOPHILS # BLD AUTO: 0.01 K/UL
BASOPHILS NFR BLD: 0.1 %
BILIRUB SERPL-MCNC: 0.3 MG/DL
BUN SERPL-MCNC: 5 MG/DL
CALCIUM SERPL-MCNC: 9.2 MG/DL
CHLORIDE SERPL-SCNC: 104 MMOL/L
CO2 SERPL-SCNC: 26 MMOL/L
CREAT SERPL-MCNC: 0.7 MG/DL
DIFFERENTIAL METHOD: ABNORMAL
EOSINOPHIL # BLD AUTO: 0 K/UL
EOSINOPHIL NFR BLD: 0.6 %
ERYTHROCYTE [DISTWIDTH] IN BLOOD BY AUTOMATED COUNT: 16.8 %
EST. GFR  (AFRICAN AMERICAN): >60 ML/MIN/1.73 M^2
EST. GFR  (NON AFRICAN AMERICAN): >60 ML/MIN/1.73 M^2
GLUCOSE SERPL-MCNC: 89 MG/DL
HCT VFR BLD AUTO: 32.6 %
HGB BLD-MCNC: 10.9 G/DL
LYMPHOCYTES # BLD AUTO: 2.4 K/UL
LYMPHOCYTES NFR BLD: 34.4 %
MCH RBC QN AUTO: 27 PG
MCHC RBC AUTO-ENTMCNC: 33.4 G/DL
MCV RBC AUTO: 81 FL
MONOCYTES # BLD AUTO: 0.9 K/UL
MONOCYTES NFR BLD: 12.8 %
NEUTROPHILS # BLD AUTO: 3.7 K/UL
NEUTROPHILS NFR BLD: 52.1 %
PLATELET # BLD AUTO: 206 K/UL
PMV BLD AUTO: 8.8 FL
POTASSIUM SERPL-SCNC: 4.2 MMOL/L
PROT SERPL-MCNC: 6.7 G/DL
RBC # BLD AUTO: 4.03 M/UL
SODIUM SERPL-SCNC: 134 MMOL/L
WBC # BLD AUTO: 7.03 K/UL

## 2017-10-30 PROCEDURE — 99999 PR PBB SHADOW E&M-EST. PATIENT-LVL III: CPT | Mod: PBBFAC,,, | Performed by: INTERNAL MEDICINE

## 2017-10-30 PROCEDURE — 96413 CHEMO IV INFUSION 1 HR: CPT

## 2017-10-30 PROCEDURE — A4216 STERILE WATER/SALINE, 10 ML: HCPCS | Performed by: INTERNAL MEDICINE

## 2017-10-30 PROCEDURE — 99215 OFFICE O/P EST HI 40 MIN: CPT | Mod: 25,S$GLB,, | Performed by: INTERNAL MEDICINE

## 2017-10-30 PROCEDURE — 25000003 PHARM REV CODE 250: Performed by: INTERNAL MEDICINE

## 2017-10-30 PROCEDURE — 85025 COMPLETE CBC W/AUTO DIFF WBC: CPT

## 2017-10-30 PROCEDURE — 80053 COMPREHEN METABOLIC PANEL: CPT

## 2017-10-30 PROCEDURE — 63600175 PHARM REV CODE 636 W HCPCS: Performed by: INTERNAL MEDICINE

## 2017-10-30 PROCEDURE — 96367 TX/PROPH/DG ADDL SEQ IV INF: CPT

## 2017-10-30 PROCEDURE — S0028 INJECTION, FAMOTIDINE, 20 MG: HCPCS | Performed by: INTERNAL MEDICINE

## 2017-10-30 PROCEDURE — 36415 COLL VENOUS BLD VENIPUNCTURE: CPT | Mod: PO

## 2017-10-30 PROCEDURE — 96417 CHEMO IV INFUS EACH ADDL SEQ: CPT

## 2017-10-30 RX ORDER — SODIUM CHLORIDE 0.9 % (FLUSH) 0.9 %
10 SYRINGE (ML) INJECTION
Status: CANCELLED | OUTPATIENT
Start: 2017-10-30

## 2017-10-30 RX ORDER — DIPHENHYDRAMINE HYDROCHLORIDE 50 MG/ML
50 INJECTION INTRAMUSCULAR; INTRAVENOUS ONCE AS NEEDED
Status: CANCELLED | OUTPATIENT
Start: 2017-10-30 | End: 2017-10-30

## 2017-10-30 RX ORDER — HEPARIN 100 UNIT/ML
500 SYRINGE INTRAVENOUS
Status: CANCELLED | OUTPATIENT
Start: 2017-10-30

## 2017-10-30 RX ORDER — FAMOTIDINE 20 MG/50ML
20 INJECTION, SOLUTION INTRAVENOUS
Status: COMPLETED | OUTPATIENT
Start: 2017-10-30 | End: 2017-10-30

## 2017-10-30 RX ORDER — HEPARIN 100 UNIT/ML
500 SYRINGE INTRAVENOUS
Status: DISCONTINUED | OUTPATIENT
Start: 2017-10-30 | End: 2017-10-30 | Stop reason: HOSPADM

## 2017-10-30 RX ORDER — SODIUM CHLORIDE 9 MG/ML
10 INJECTION, SOLUTION INTRAMUSCULAR; INTRAVENOUS; SUBCUTANEOUS
Status: DISCONTINUED | OUTPATIENT
Start: 2017-10-30 | End: 2017-10-30 | Stop reason: HOSPADM

## 2017-10-30 RX ORDER — FAMOTIDINE 20 MG/50ML
20 INJECTION, SOLUTION INTRAVENOUS
Status: CANCELLED
Start: 2017-10-30 | End: 2017-10-30

## 2017-10-30 RX ORDER — EPINEPHRINE 0.3 MG/.3ML
0.3 INJECTION SUBCUTANEOUS ONCE AS NEEDED
Status: CANCELLED | OUTPATIENT
Start: 2017-10-30 | End: 2017-10-30

## 2017-10-30 RX ADMIN — DEXAMETHASONE SODIUM PHOSPHATE: 4 INJECTION, SOLUTION INTRA-ARTICULAR; INTRALESIONAL; INTRAMUSCULAR; INTRAVENOUS; SOFT TISSUE at 10:10

## 2017-10-30 RX ADMIN — SODIUM CHLORIDE: 900 INJECTION, SOLUTION INTRAVENOUS at 09:10

## 2017-10-30 RX ADMIN — PACLITAXEL 90 MG: 6 INJECTION, SOLUTION, CONCENTRATE INTRAVENOUS at 11:10

## 2017-10-30 RX ADMIN — DIPHENHYDRAMINE HYDROCHLORIDE 50 MG: 50 INJECTION, SOLUTION INTRAMUSCULAR; INTRAVENOUS at 10:10

## 2017-10-30 RX ADMIN — SODIUM CHLORIDE 10 ML: 9 INJECTION, SOLUTION INTRAMUSCULAR; INTRAVENOUS; SUBCUTANEOUS at 09:10

## 2017-10-30 RX ADMIN — CARBOPLATIN 300 MG: 10 INJECTION, SOLUTION INTRAVENOUS at 12:10

## 2017-10-30 RX ADMIN — HEPARIN 500 UNITS: 100 SYRINGE at 01:10

## 2017-10-30 RX ADMIN — FAMOTIDINE 20 MG: 20 INJECTION, SOLUTION INTRAVENOUS at 09:10

## 2017-10-30 NOTE — PATIENT INSTRUCTIONS
[facility]  [address]  [address] [address]  [phone] phone     [phone] fax  Hours of Operation: Monday- Friday 8:00am- 5:00pm  After hours phone  [phone]  Hematology / Oncology Physicians on call      Dr. Glen Welch                        Please call with any concerns regarding your appointment today.    HOME CARE AFTER CHEMOTHERAPY   Meals   Many patients feel sick and lose their appetites during treatment. Eat small meals several times a day. Choose bland foods with little taste or smell if you have problems with nausea. Be sure to cook all food thoroughly. This kills bacteria and helps you avoid intestinal infection. Soft foods are easier to swallow and digest.   Activity   Exercise keeps you strong and keeps your heart and lungs active. Talk to your doctor about an appropriate exercise program for you.   Skin Care   To prevent a skin infection, bathe or shower once a day. Use a moisturizing soap and wash with warm water. Avoid very hot or cold water. Chemotherapy can make your skin dry . Apply moisturizing lotion to help relieve dry skin. Some drugs used in high doses can cause slight burns to appear (like sunburn). Ask for a special cream to help relieve the burn and protect your skin.   Prevent Mouth Sores   During chemotherapy, many people get mouth sores. Do the following to help prevent mouth sores or to ease discomfort.   Brush your teeth with a soft-bristle toothbrush after every meal.  Don't use dental floss if your platelet count is below 50,000. Your doctor or nurse will tell you if this is the case.  Use an oral swab or special soft toothbrush if your gums bleed during regular brushing.  Use mouthwash as directed. If you can't tolerate commercial mouthwash, use salt and baking soda to clean your mouth. Mix 1 teaspoon of salt and 1 teaspoon of baking soda into a glass of water. Swish and spit.  Call your  doctor or return to this facility if you develop any of the following:   Sore throat   White patches in the mouth or throat   Fever of 100.4ºF (38ºC) or higher, or as directed by your healthcare provider  © 5063-1147 [employer], [address] [place] [address], [place] [address]. All rights reserved. This information is not intended as a substitute for professional medical care. Always follow your healthcare professional's       WAYS TO HELP PREVENT INFECTION         WASH YOUR HANDS OFTEN DURING THE DAY, ESPECIALLY BEFORE YOU EAT, AFTER USING THE BATHROOM, AND AFTER TOUCHING ANIMALS     STAY AWAY FROM PEOPLE WHO HAVE ILLNESSES YOU CAN CATCH; SUCH AS COLDS, FLU, CHICKEN POX     TRY TO AVOID CROWDS     STAY AWAY FROM CHILDREN WHO RECENTLY HAVE RECEIVED LIVE VIRUS VACCINES     MAINTAIN GOOD MOUTH CARE     DO NOT SQUEEZE OR SCRATCH PIMPLES     CLEAN CUTS & SCRAPES RIGHT AWAY AND DAILY UNTIL HEALED WITH WARM WATER, SOAP & AN ANTISEPTIC     AVOID CONTACT WITH LITTER BOXES, BIRD CAGES, & FISH TANKS     AVOID STANDING WATER, IE., BIRD BATHS, FLOWER POTS/VASES, OR HUMIDIFIERS     WEAR GLOVES WHEN GARDENING OR CLEANING UP AFTER OTHERS, ESPECIALLY BABIES & SMALL CHILDREN     DO NOT EAT RAW FISH, SEAFOOD, MEAT, OR EGGS    YOU HAVE STARTED ON CHEMOTHERAPY. IF YOU EXPERIENCE ANY OF THE FOLLOWING PROBLEMS, CALL THE OFFICE IMMEDIATELY.    *FEVER .0 OR GREATER    *CHILLS, ESPECIALLY SHAKING CHILLS, OR SWEATING    *A SEVERE COUGH OR SORE THROAT, OR SINUS PAIN/     PRESSURE    *REDNESS, SWELLING, OR TENDERNESS AROUND A WOUND,     SORE, PIMPLE, RECTAL AREA, OR IV SITE    *SORES OR ULCERS IN THE MOUTH    *BLISTERS ON THE LIPS OR SKIN    *FREQUENT URGENCY TO URINATE OR A BURNING FEELING   WHEN YOU URINATE    *BLOOD IN THE URINE OR STOOL    *ANY UNEXPLAINED BRUISING OR PROLONGED BLEEDING,     (NOSEBLEEDS OR BLEEDING GUMS)    *LOOSE BOWEL MOVEMENTS THAT DO NOT RESPOND TO     IMODIUM OR MORE THAN THREE TIMES A  DAY    *VOMITING UNRESPONSIVE TO ANTINAUSEA MEDICINE    *ANY UNUSUAL PHYSICAL SYMPTOMS THAT BEGAN AFTER     CHEMOTHERAPY    DURING WEEKDAYS, CALL AND ASK TO SPEAK DIRECTLY TO A NURSE.  AT OTHER TIMES, CALL THE OFFICE PHONE NUMBER; THE ANSWERING SERVICE WILL CONTACT THE ON-CALL PHYSICIAN.  SOMEONE IS AVAILABLE 24 HOURS A DAY, SEVEN DAYS A WEEK.    FALL PREVENTION   Falls often occur due to slipping, tripping or losing your balance. Here are ways to reduce your risk of falling again.   Was there anything that caused your fall that can be fixed, removed or replaced?   Make your home safe by keeping walkways clear of objects you may trip over.   Use non-slip pads under rugs.   Do not walk in poorly lit areas.   Do not stand on chairs or wobbly ladders.   Use caution when reaching overhead or looking upward. This position can cause a loss of balance.   Be sure your shoes fit properly, have non-slip bottoms and are in good condition.   Be cautious when going up and down stairs, curbs, and when walking on uneven sidewalks.   If your balance is poor, consider using a cane or walker.   If your fall was related to alcohol use, stop or limit alcohol intake.   If your fall was related to use of sleeping medicines, talk to your doctor about this. You may need to reduce your dosage at bedtime if you awaken during the night to go to the bathroom.   To reduce the need for nighttime bathroom trips:   Avoid drinking fluids for several hours before going to bed   Empty your bladder before going to bed   Men can keep a urinal at the bedside   © 4401-0417 [employer] [facility], [address] [place] [address], [place] [address]. All rights reserved. This information is not intended as a substitute for professional medical care. Always follow your healthcare professional's instructions.

## 2017-10-30 NOTE — PROGRESS NOTES
Reason for visit: Squamous cell carcinoma of the right lung  Date of Diagnosis: August 22, 2017    HPI:   The patient is a 67-year-old -American male who presents to the hematology oncology clinic today to discuss further evaluation and management recommendations for squamous cell carcinoma of the right lung.  The patient has been referred to me by Dr. Zackery Peña with pulmonary medicine.  I have reviewed all of the patient's clinical history available in the medical record and have utilized this in my evaluation and management recommendations today.  Today the patient reports that he has a chronic cough with clear sputum.  He has had this for about 4-5 months.  He denies any fevers, chills or night sweats.  He denies any loss of appetite or unintentional weight loss.  He denies any melena, hemoptysis, hematemesis, hematuria or hematochezia.  He denies any bowel or urinary complaints.  He denies any chest pain.  He does report dyspnea with exertion.  He reports occasional headaches.  He states that he has noticed discoloration of the glans penis. He has appointment with urology scheduled.    PAST MEDICAL HISTORY:   1.  Hypertension  2.  Dyslipidemia  3.  Bell's palsy  4.  Osteoarthritis/degenerative disc disease  5.  BPH  6.  Hearing impaired  7.  Vitamin D deficiency  8.  Depression    SURGICAL HISTORY:   1.  Hemorrhoidectomy  2.  Right neck mass excision which was benign    FAMILY HISTORY: He denies any immediate family members with cancer or bleeding/clotting disorders.    SOCIAL HISTORY: He reports of 40-pack-year smoking history and quit in August 2017.  He reports taking alcohol socially.  He has never used any recreational drugs.  He is medically disabled.  He is .    ALLERGIES: [NKDA]    MEDICATIONS: [Medcard has been reviewed and/or reconciled.]    REVIEW OF SYSTEMS:   GENERAL: [No fevers, chills or sweats. No fatigue, weight loss or loss of appetite.]  HEENT: [No blurred vision,  tinnitus, nasal discharge, sorethroat or dysphagia.]  HEART: [No chest pain, palpitations or shortness of breath.]    LUNGS: [Reports cough. Denies hemoptysis or breathing problems.]  ABDOMEN: [No abdominal pain, nausea, vomiting, diarrhea, constipation or melena.]  GENITOURINARY: [No dysuria, bleeding or malodorous discharge.]  NEURO: [No headache, dizziness or vertigo.]  HEMATOLOGY: [No easy bruising, spontaneous bleeding or blood clots in the past].  MUSCULOSKELETAL: [No arthralgias, myalgias or bone pains.]  SKIN: [No rashes or skin lesions.]  PSYCHIATRY: [No depression. Reports anxiety.]    PHYSICAL EXAMINATION:   VS: Reviewed on nurse's notes.  APPEARANCE: The patient is a well-developed, well-nourished and well-groomed -American male who appears in no acute distress.    HEENT: No scleral icterus. Both external auditory canals clear. No oral ulcers, lesions. Throat clear  HEAD: No sinus tenderness.  NECK: Supple. No palpable lymphadenopathy. Thyroid non-tender, no palpable masses  CHEST: Scattered rales.  Scattered wheezes.  No rhonchi. Unlabored respirations.  CARDIOVASCULAR: Normal S1, S2. Normal rate. Regular rhythm.  ABDOMEN: Bowel sounds normal. No tenderness. No abdominal distention. No hepatomegaly. No splenomegaly.  : Deferred today.  LYMPHATIC: No palpable supraclavicular, axillary nodes  EXTREMITIES: No clubbing, cyanosis, edema  SKIN: No lesions. No petechiae. No ecchymoses. No induration or nodules  NEUROLOGIC: No focal findings. Alert & Oriented x 3. Mood appropriate to affect    LABS:   Reviewed    IMAGING:  Reviewed    IMPRESSION:  1.  Squamous cell carcinoma of the right lung    PLAN:  1.  I had a detailed discussion with the patient today with regard to the diagnosis, prognosis and treatment for his squamous cell carcinoma of the right lung.  2.  MRI of the brain done on August 25, 2017 does not show any evidence of metastatic disease .  I reviewed the results of his recent lab work  with him in detail as well.  He expressed understanding.  3. Proceed with week 6 of carboplatin/Taxol today.  4.  The patient stated that he had a lot of pain when he underwent simulation for XRT.  He reports that the pain has since resolved.  However he does not want to proceed with definitive radiation therapy at this time.  He understands risks/benefits including the possibility that we may not be able to cure his malignancy if we are unable to add definitive radiation therapy to his chemotherapy.  I have also offered him a referral to a different radiation oncology facility if he does not want to undergo radiation therapy at the [facility].  However the patient stated that he does not want to do this at this time.  He stated that he will let me know if he changes his mind.  The patient also reported his concerns with regard to something having possibly gone wrong with one of his chemotherapy infusions.  I have discussed with him that I have checked the treatment plan and everything seems to have been given accurately as ordered.  I did have our chemotherapy nurse supervisor look into his concerns as well.  He expressed understanding.  He is agreeable to continue his chemotherapy.  5.  Results of PET/CT scan from October 18, 2017 shows interval improvement in disease burden.  I have discussed the option of increasing his dose of Taxol to 80 mg/m² as he has decided not to get radiation at this time.  He understands that at this time the focus is essentially disease control and not cure based on his decision not to proceed with radiation therapy.  However at this time the patient would like to keep his dose of Taxol at 45 mg/m² and will let me know if he changes his mind with regard to this.  He understands risks/benefits.  6.  Keep urology consult for further evaluation with regard to glans penis discoloration.    Follow-up on 11/10/17 with labs for cycle 2 week 1 of carboplatin/Taxol.  I will plan on  obtaining PET/CT scan for reevaluation of his disease in Dec 2017. He knows to call sooner if any new problems or questions.      Burt De Luna MD

## 2017-11-03 PROBLEM — M51.36 DDD (DEGENERATIVE DISC DISEASE), LUMBAR: Chronic | Status: ACTIVE | Noted: 2017-06-13

## 2017-11-03 PROBLEM — M50.30 DDD (DEGENERATIVE DISC DISEASE), CERVICAL: Chronic | Status: ACTIVE | Noted: 2017-06-13

## 2017-11-04 ENCOUNTER — NURSE TRIAGE (OUTPATIENT)
Dept: ADMINISTRATIVE | Facility: CLINIC | Age: 67
End: 2017-11-04

## 2017-11-08 DIAGNOSIS — I10 ESSENTIAL HYPERTENSION: ICD-10-CM

## 2017-11-08 RX ORDER — AMLODIPINE AND BENAZEPRIL HYDROCHLORIDE 5; 10 MG/1; MG/1
1 CAPSULE ORAL DAILY
Qty: 30 CAPSULE | Refills: 3 | Status: SHIPPED | OUTPATIENT
Start: 2017-11-08 | End: 2018-01-11 | Stop reason: SDUPTHER

## 2017-11-08 NOTE — TELEPHONE ENCOUNTER
----- Message from Rachael Blood sent at 11/8/2017  9:39 AM CST -----  Contact: [phone]  Would like to consult with nurse about refill on BP med; Did not have name of med;  Please call pt wife bk at [phone]. alexx blackmon     .  [facility] [employer] [address] [place] [address] & [address] [address]  Phone: [phone] Fax: [phone]

## 2017-11-10 ENCOUNTER — OFFICE VISIT (OUTPATIENT)
Dept: HEMATOLOGY/ONCOLOGY | Facility: CLINIC | Age: 67
End: 2017-11-10
Payer: MEDICARE

## 2017-11-10 ENCOUNTER — LAB VISIT (OUTPATIENT)
Dept: LAB | Facility: HOSPITAL | Age: 67
End: 2017-11-10
Attending: INTERNAL MEDICINE
Payer: MEDICARE

## 2017-11-10 ENCOUNTER — INFUSION (OUTPATIENT)
Dept: INFUSION THERAPY | Facility: HOSPITAL | Age: 67
End: 2017-11-10
Attending: INTERNAL MEDICINE
Payer: MEDICARE

## 2017-11-10 VITALS
TEMPERATURE: 98 F | DIASTOLIC BLOOD PRESSURE: 77 MMHG | SYSTOLIC BLOOD PRESSURE: 133 MMHG | BODY MASS INDEX: 27.17 KG/M2 | RESPIRATION RATE: 18 BRPM | OXYGEN SATURATION: 99 % | HEART RATE: 81 BPM | HEIGHT: 70 IN | WEIGHT: 189.81 LBS

## 2017-11-10 VITALS
SYSTOLIC BLOOD PRESSURE: 119 MMHG | OXYGEN SATURATION: 98 % | TEMPERATURE: 98 F | DIASTOLIC BLOOD PRESSURE: 71 MMHG | HEART RATE: 56 BPM | RESPIRATION RATE: 16 BRPM

## 2017-11-10 DIAGNOSIS — C77.1 CANCER OF INTRATHORACIC LYMPH NODES, SECONDARY: ICD-10-CM

## 2017-11-10 DIAGNOSIS — C34.91 SQUAMOUS CELL CARCINOMA OF RIGHT LUNG: Chronic | ICD-10-CM

## 2017-11-10 DIAGNOSIS — C34.91 SQUAMOUS CELL CARCINOMA OF RIGHT LUNG: Primary | ICD-10-CM

## 2017-11-10 DIAGNOSIS — R91.8 ENDOBRONCHIAL MASS: ICD-10-CM

## 2017-11-10 LAB
ALBUMIN SERPL BCP-MCNC: 3.4 G/DL
ALP SERPL-CCNC: 89 U/L
ALT SERPL W/O P-5'-P-CCNC: 19 U/L
ANION GAP SERPL CALC-SCNC: 9 MMOL/L
AST SERPL-CCNC: 16 U/L
BASOPHILS # BLD AUTO: 0.01 K/UL
BASOPHILS NFR BLD: 0.1 %
BILIRUB SERPL-MCNC: 0.4 MG/DL
BUN SERPL-MCNC: 4 MG/DL
CALCIUM SERPL-MCNC: 9.2 MG/DL
CHLORIDE SERPL-SCNC: 99 MMOL/L
CO2 SERPL-SCNC: 26 MMOL/L
CREAT SERPL-MCNC: 0.7 MG/DL
DIFFERENTIAL METHOD: ABNORMAL
EOSINOPHIL # BLD AUTO: 0.1 K/UL
EOSINOPHIL NFR BLD: 0.7 %
ERYTHROCYTE [DISTWIDTH] IN BLOOD BY AUTOMATED COUNT: 17.9 %
EST. GFR  (AFRICAN AMERICAN): >60 ML/MIN/1.73 M^2
EST. GFR  (NON AFRICAN AMERICAN): >60 ML/MIN/1.73 M^2
GLUCOSE SERPL-MCNC: 82 MG/DL
HCT VFR BLD AUTO: 35.3 %
HGB BLD-MCNC: 11.9 G/DL
LYMPHOCYTES # BLD AUTO: 2.5 K/UL
LYMPHOCYTES NFR BLD: 36.8 %
MCH RBC QN AUTO: 26.7 PG
MCHC RBC AUTO-ENTMCNC: 33.7 G/DL
MCV RBC AUTO: 79 FL
MONOCYTES # BLD AUTO: 0.8 K/UL
MONOCYTES NFR BLD: 11.8 %
NEUTROPHILS # BLD AUTO: 3.5 K/UL
NEUTROPHILS NFR BLD: 50.6 %
PLATELET # BLD AUTO: 222 K/UL
PMV BLD AUTO: 9.4 FL
POTASSIUM SERPL-SCNC: 4.8 MMOL/L
PROT SERPL-MCNC: 6.9 G/DL
RBC # BLD AUTO: 4.46 M/UL
SODIUM SERPL-SCNC: 134 MMOL/L
WBC # BLD AUTO: 6.85 K/UL

## 2017-11-10 PROCEDURE — 85025 COMPLETE CBC W/AUTO DIFF WBC: CPT

## 2017-11-10 PROCEDURE — 99215 OFFICE O/P EST HI 40 MIN: CPT | Mod: S$GLB,,, | Performed by: INTERNAL MEDICINE

## 2017-11-10 PROCEDURE — 63600175 PHARM REV CODE 636 W HCPCS: Performed by: INTERNAL MEDICINE

## 2017-11-10 PROCEDURE — 36415 COLL VENOUS BLD VENIPUNCTURE: CPT

## 2017-11-10 PROCEDURE — 96367 TX/PROPH/DG ADDL SEQ IV INF: CPT

## 2017-11-10 PROCEDURE — 96413 CHEMO IV INFUSION 1 HR: CPT

## 2017-11-10 PROCEDURE — 80053 COMPREHEN METABOLIC PANEL: CPT

## 2017-11-10 PROCEDURE — 25000003 PHARM REV CODE 250: Performed by: INTERNAL MEDICINE

## 2017-11-10 PROCEDURE — 96417 CHEMO IV INFUS EACH ADDL SEQ: CPT

## 2017-11-10 PROCEDURE — 99499 UNLISTED E&M SERVICE: CPT | Mod: S$GLB,,, | Performed by: INTERNAL MEDICINE

## 2017-11-10 PROCEDURE — S0028 INJECTION, FAMOTIDINE, 20 MG: HCPCS | Performed by: INTERNAL MEDICINE

## 2017-11-10 PROCEDURE — 99999 PR PBB SHADOW E&M-EST. PATIENT-LVL III: CPT | Mod: PBBFAC,,, | Performed by: INTERNAL MEDICINE

## 2017-11-10 RX ORDER — EPINEPHRINE 0.3 MG/.3ML
0.3 INJECTION SUBCUTANEOUS ONCE AS NEEDED
Status: CANCELLED | OUTPATIENT
Start: 2017-11-10 | End: 2017-11-10

## 2017-11-10 RX ORDER — FAMOTIDINE 20 MG/50ML
20 INJECTION, SOLUTION INTRAVENOUS
Status: CANCELLED | OUTPATIENT
Start: 2017-11-10 | End: 2017-11-10

## 2017-11-10 RX ORDER — FAMOTIDINE 20 MG/50ML
20 INJECTION, SOLUTION INTRAVENOUS
Status: COMPLETED | OUTPATIENT
Start: 2017-11-10 | End: 2017-11-10

## 2017-11-10 RX ORDER — HEPARIN 100 UNIT/ML
500 SYRINGE INTRAVENOUS
Status: CANCELLED | OUTPATIENT
Start: 2017-11-10

## 2017-11-10 RX ORDER — SODIUM CHLORIDE 9 MG/ML
10 INJECTION, SOLUTION INTRAMUSCULAR; INTRAVENOUS; SUBCUTANEOUS
Status: CANCELLED | OUTPATIENT
Start: 2017-11-10

## 2017-11-10 RX ORDER — SODIUM CHLORIDE 9 MG/ML
10 INJECTION, SOLUTION INTRAMUSCULAR; INTRAVENOUS; SUBCUTANEOUS
Status: DISCONTINUED | OUTPATIENT
Start: 2017-11-10 | End: 2017-11-10 | Stop reason: HOSPADM

## 2017-11-10 RX ORDER — HEPARIN 100 UNIT/ML
500 SYRINGE INTRAVENOUS
Status: DISCONTINUED | OUTPATIENT
Start: 2017-11-10 | End: 2017-11-10 | Stop reason: HOSPADM

## 2017-11-10 RX ORDER — DIPHENHYDRAMINE HYDROCHLORIDE 50 MG/ML
50 INJECTION INTRAMUSCULAR; INTRAVENOUS ONCE AS NEEDED
Status: CANCELLED | OUTPATIENT
Start: 2017-11-10 | End: 2017-11-10

## 2017-11-10 RX ADMIN — FAMOTIDINE 20 MG: 20 INJECTION, SOLUTION INTRAVENOUS at 10:11

## 2017-11-10 RX ADMIN — HEPARIN 500 UNITS: 100 SYRINGE at 01:11

## 2017-11-10 RX ADMIN — DEXAMETHASONE SODIUM PHOSPHATE: 4 INJECTION, SOLUTION INTRA-ARTICULAR; INTRALESIONAL; INTRAMUSCULAR; INTRAVENOUS; SOFT TISSUE at 11:11

## 2017-11-10 RX ADMIN — CARBOPLATIN 300 MG: 10 INJECTION, SOLUTION INTRAVENOUS at 12:11

## 2017-11-10 RX ADMIN — PACLITAXEL 90 MG: 6 INJECTION, SOLUTION, CONCENTRATE INTRAVENOUS at 11:11

## 2017-11-10 RX ADMIN — DIPHENHYDRAMINE HYDROCHLORIDE 50 MG: 50 INJECTION, SOLUTION INTRAMUSCULAR; INTRAVENOUS at 11:11

## 2017-11-10 RX ADMIN — SODIUM CHLORIDE: 900 INJECTION, SOLUTION INTRAVENOUS at 10:11

## 2017-11-10 NOTE — PLAN OF CARE
Problem: Patient Care Overview  Goal: Plan of Care Review  Outcome: Ongoing (interventions implemented as appropriate)  States he feels he is tolerating his chemo well.

## 2017-11-10 NOTE — PROGRESS NOTES
Hematology/Oncology Office Note    CC:  Lung cancer    Referred by:  No ref. provider found    Diagnosis:  Squamous cell carcinoma the right lung    HPI:   The patient is a 67-year-old -American male who presents to the hematology oncology clinic today to discuss further evaluation and management recommendations for squamous cell carcinoma of the right lung.  The patient has been referred to me by Dr. Zackery Peña with pulmonary medicine.  I have reviewed all of the patient's clinical history available in the medical record and have utilized this in my evaluation and management recommendations today.  Today the patient reports that he has a chronic cough with clear sputum.  He has had this for about 3-4 months.  He denies any fevers, chills or night sweats.  He denies any loss of appetite or unintentional weight loss.  He denies any melena, hemoptysis, hematemesis, hematuria or hematochezia.  He denies any bowel or urinary complaints.  He denies any chest pain.  He does report dyspnea with exertion.  He reports occasional headaches.    I have reviewed and updated the HPI, ROS, PMHx, Social Hx, Family Hx and treatment history.    Today's visit:  Patient is without complaints today.  He presents for next cycle of weekly carbotaxol.  He continues to refuse radiation therapy.    Past Medical History:   Diagnosis Date    Borderline high blood pressure     Chronic low back pain     DDD (degenerative disc disease), lumbar 6/13/2017    H/O: Bell's palsy     H/O: Bell's palsy     Hyperlipidemia     Hypertension     Major depression     Sciatica     Squamous cell carcinoma of right lung     Tobacco abuse     Trouble in sleeping          Social History:  No tobacco, alcohol, or illicit drugs      Family History: family history includes Diabetes in his brother, brother, and sister; Heart attack (age of onset: 87) in his father; Heart disease in his brother and mother; Hypertension in his mother; Stroke  in his mother.        HPI    Review of Systems   Constitutional: Negative for activity change, appetite change, fatigue, fever and unexpected weight change.   HENT: Negative for congestion, dental problem, drooling, ear discharge, facial swelling, nosebleeds, rhinorrhea, sneezing and sore throat.    Eyes: Negative.    Respiratory: Negative for apnea, cough, choking, chest tightness and shortness of breath.    Cardiovascular: Negative for chest pain, palpitations and leg swelling.   Gastrointestinal: Negative for abdominal distention, blood in stool, constipation, diarrhea, nausea and vomiting.   Endocrine: Negative.    Genitourinary: Negative for decreased urine volume, difficulty urinating, frequency, genital sores, hematuria, testicular pain and urgency.   Musculoskeletal: Negative for arthralgias, back pain, gait problem, joint swelling, myalgias, neck pain and neck stiffness.   Skin: Negative for color change, pallor, rash and wound.   Allergic/Immunologic: Negative.    Neurological: Negative for dizziness, speech difficulty, weakness, light-headedness and numbness.   Hematological: Negative for adenopathy. Does not bruise/bleed easily.   Psychiatric/Behavioral: Negative for agitation, behavioral problems, confusion, decreased concentration, dysphoric mood and hallucinations. The patient is not nervous/anxious and is not hyperactive.        Objective:      Physical Exam   Constitutional: He is oriented to person, place, and time. He appears well-developed and well-nourished. No distress.   HENT:   Head: Normocephalic and atraumatic.   Nose: Nose normal.   Mouth/Throat: Oropharynx is clear and moist. No oropharyngeal exudate.   Eyes: Conjunctivae and EOM are normal. Pupils are equal, round, and reactive to light. Right eye exhibits no discharge. Left eye exhibits no discharge. No scleral icterus.   Neck: Normal range of motion. Neck supple. No tracheal deviation present. No thyromegaly present.   Cardiovascular:  Normal rate and regular rhythm.  Exam reveals no gallop and no friction rub.    No murmur heard.  Pulmonary/Chest: Effort normal and breath sounds normal. No respiratory distress. He has no wheezes. He has no rales. He exhibits no tenderness.   Abdominal: Soft. Bowel sounds are normal. He exhibits no distension. There is no tenderness.   Musculoskeletal: Normal range of motion. He exhibits no edema, tenderness or deformity.   Lymphadenopathy:     He has no cervical adenopathy.   Neurological: He is alert and oriented to person, place, and time. He displays normal reflexes. No cranial nerve deficit. He exhibits normal muscle tone. Coordination normal.   Skin: Skin is warm, dry and intact. Capillary refill takes less than 2 seconds. No abrasion, no bruising, no burn and no rash noted. He is not diaphoretic. No erythema. Nails show no clubbing.   Psychiatric: He has a normal mood and affect. Thought content normal.   Vitals reviewed.      Lab Results   Component Value Date    WBC 6.85 11/10/2017    HGB 11.9 (L) 11/10/2017    HCT 35.3 (L) 11/10/2017    MCV 79 (L) 11/10/2017     11/10/2017     Lab Results   Component Value Date    CREATININE 0.7 11/10/2017    BUN 4 (L) 11/10/2017     (L) 11/10/2017    K 4.8 11/10/2017    CL 99 11/10/2017    CO2 26 11/10/2017     Lab Results   Component Value Date    ALT 19 11/10/2017    AST 16 11/10/2017    ALKPHOS 89 11/10/2017    BILITOT 0.4 11/10/2017       Assessment:       67-year-old gentleman with squamous cell carcinoma of the lung clinically stage III who is currently receiving weekly carboplatin/Taxol.  The patient has refused radiation therapy and would like to continue chemotherapy only.  We will proceed with treatment as scheduled today and have the patient follow-up with Dr. De Luna next week.      Stage III squamous cell carcinoma the right lung:  --Check labs today and proceed with weekly carboplatin/Taxol accordingly  --Follow-up with primary oncologist  Dr. De Luna in 1 week

## 2017-11-10 NOTE — PATIENT INSTRUCTIONS
[facility]  [address]  [address] [address]  [phone] phone     [phone] fax  Hours of Operation: Monday- Friday 8:00am- 5:00pm  After hours phone  [phone]  Hematology / Oncology Physicians on call      Dr. Glen Pinto    Please call with any concerns regarding your appointment today.        Treating Constipation    Constipation is a common and often uncomfortable problem. Constipation means you have bowel movements fewer than 3 times per week, or strain to pass hard, dry stool. It can last a short time. Or it can be a problem that never seems to go away. The good news is that it can often be treated and controlled.  Eat more fiber  One of the best ways to help treat constipation is to increase your fiber intake. You can do this either through diet or by using fiber supplements. Fiber (in whole grains, fruits, and vegetables) adds bulk and absorbs water to soften the stool. This helps the stool pass through the colon more easily. When you increase your fiber intake, do it slowly to avoid side effects such as bloating. Also increase the amount of water that you drink. Eating more of the following foods can add fiber to your diet.  · High-fiber cereals  · Whole grains, bran, and brown rice  · Vegetables such as carrots, broccoli, and greens  · Fresh fruits (especially apples, pears, and dried fruits like raisins and apricots)  · Nuts and legumes (especially beans such as lentils, kidney beans, and lima beans)  Get physically active  Exercise helps improve the working of your colon which helps ease constipation. Try to get some physical activity every day. If you havent been active for a while, talk to your healthcare provider before starting again.  Laxatives  Your healthcare provider may suggest an over-the-counter product to help ease your constipation. He or she may suggest the use of bulk-forming agents or  laxatives. The use of laxatives, if used as directed, is common and safe. Follow directions carefully when using them. See your healthcare provider for new-onset constipation, or long-term constipation, to rule out other causes such as medicines or thyroid disease.  Date Last Reviewed: 7/1/2016  © 2877-9820 [employer]. [address] [place] [address], [place] [address]. All rights reserved. This information is not intended as a substitute for professional medical care. Always follow your healthcare professional's instructions.

## 2017-11-13 ENCOUNTER — OFFICE VISIT (OUTPATIENT)
Dept: UROLOGY | Facility: CLINIC | Age: 67
End: 2017-11-13
Payer: MEDICARE

## 2017-11-13 ENCOUNTER — OFFICE VISIT (OUTPATIENT)
Dept: INTERNAL MEDICINE | Facility: CLINIC | Age: 67
End: 2017-11-13
Payer: MEDICARE

## 2017-11-13 VITALS
HEART RATE: 96 BPM | HEIGHT: 70 IN | SYSTOLIC BLOOD PRESSURE: 136 MMHG | WEIGHT: 188.25 LBS | OXYGEN SATURATION: 99 % | BODY MASS INDEX: 26.95 KG/M2 | DIASTOLIC BLOOD PRESSURE: 74 MMHG

## 2017-11-13 VITALS
DIASTOLIC BLOOD PRESSURE: 80 MMHG | HEIGHT: 70 IN | HEART RATE: 88 BPM | BODY MASS INDEX: 26.82 KG/M2 | SYSTOLIC BLOOD PRESSURE: 149 MMHG | WEIGHT: 187.38 LBS

## 2017-11-13 DIAGNOSIS — M50.30 DDD (DEGENERATIVE DISC DISEASE), CERVICAL: Chronic | ICD-10-CM

## 2017-11-13 DIAGNOSIS — M51.36 DDD (DEGENERATIVE DISC DISEASE), LUMBAR: Chronic | ICD-10-CM

## 2017-11-13 DIAGNOSIS — E78.00 PURE HYPERCHOLESTEROLEMIA: Chronic | ICD-10-CM

## 2017-11-13 DIAGNOSIS — N40.0 BENIGN PROSTATIC HYPERPLASIA, UNSPECIFIED WHETHER LOWER URINARY TRACT SYMPTOMS PRESENT: Primary | ICD-10-CM

## 2017-11-13 DIAGNOSIS — J98.4 MIXED RESTRICTIVE AND OBSTRUCTIVE LUNG DISEASE: ICD-10-CM

## 2017-11-13 DIAGNOSIS — H54.7 DECREASED VISION: ICD-10-CM

## 2017-11-13 DIAGNOSIS — J43.9 MIXED RESTRICTIVE AND OBSTRUCTIVE LUNG DISEASE: ICD-10-CM

## 2017-11-13 DIAGNOSIS — Z00.00 ENCOUNTER FOR PREVENTIVE HEALTH EXAMINATION: Primary | ICD-10-CM

## 2017-11-13 DIAGNOSIS — H91.91 HEARING LOSS OF RIGHT EAR, UNSPECIFIED HEARING LOSS TYPE: Chronic | ICD-10-CM

## 2017-11-13 DIAGNOSIS — I10 ESSENTIAL HYPERTENSION: Chronic | ICD-10-CM

## 2017-11-13 DIAGNOSIS — Z86.73 HISTORY OF COMPLETED STROKE: ICD-10-CM

## 2017-11-13 DIAGNOSIS — Z72.0 TOBACCO ABUSE: ICD-10-CM

## 2017-11-13 DIAGNOSIS — N40.0 BENIGN PROSTATIC HYPERPLASIA WITHOUT LOWER URINARY TRACT SYMPTOMS: Chronic | ICD-10-CM

## 2017-11-13 DIAGNOSIS — C34.91 SQUAMOUS CELL CARCINOMA OF RIGHT LUNG: Chronic | ICD-10-CM

## 2017-11-13 LAB
BILIRUB SERPL-MCNC: NORMAL MG/DL
BLOOD URINE, POC: NORMAL
COLOR, POC UA: YELLOW
GLUCOSE UR QL STRIP: NORMAL
KETONES UR QL STRIP: NORMAL
LEUKOCYTE ESTERASE URINE, POC: NORMAL
NITRITE, POC UA: NORMAL
PH, POC UA: 7
PROTEIN, POC: NORMAL
SPECIFIC GRAVITY, POC UA: 1
UROBILINOGEN, POC UA: NORMAL

## 2017-11-13 PROCEDURE — 99999 PR PBB SHADOW E&M-EST. PATIENT-LVL III: CPT | Mod: PBBFAC,,, | Performed by: INTERNAL MEDICINE

## 2017-11-13 PROCEDURE — 99999 PR PBB SHADOW E&M-EST. PATIENT-LVL II: CPT | Mod: PBBFAC,,, | Performed by: UROLOGY

## 2017-11-13 PROCEDURE — 81002 URINALYSIS NONAUTO W/O SCOPE: CPT | Mod: S$GLB,,, | Performed by: UROLOGY

## 2017-11-13 PROCEDURE — G0439 PPPS, SUBSEQ VISIT: HCPCS | Mod: S$GLB,,, | Performed by: INTERNAL MEDICINE

## 2017-11-13 PROCEDURE — 99499 UNLISTED E&M SERVICE: CPT | Mod: S$GLB,,, | Performed by: UROLOGY

## 2017-11-13 PROCEDURE — 99499 UNLISTED E&M SERVICE: CPT | Mod: S$GLB,,, | Performed by: INTERNAL MEDICINE

## 2017-11-13 PROCEDURE — 99204 OFFICE O/P NEW MOD 45 MIN: CPT | Mod: 25,S$GLB,, | Performed by: UROLOGY

## 2017-11-13 NOTE — LETTER
November 13, 2017      Burt De Luna MD  [address] [place] [address]           [facility]  [address] [place] [address]  [place] [address]  Phone: [phone]  Fax: [phone]          Patient: Jesse Chow   MR Number: [id number]   YOB: 1950   Date of Visit: 11/13/2017       Dear Dr. Burt De Luna:    Thank you for referring Jesse Chow to me for evaluation. Attached you will find relevant portions of my assessment and plan of care.    If you have questions, please do not hesitate to call me. I look forward to following Jesse Chow along with you.    Sincerely,    Je Rivera IV, MD    Enclosure  CC:  No Recipients    If you would like to receive this communication electronically, please contact [email or web address] or [phone] to request more information on [employer] Link access.    For providers and/or their staff who would like to refer a patient to [facility], please contact us through our one-stop-shop provider referral line, [facility], at [phone].    If you feel you have received this communication in error or would no longer like to receive these types of communications, please e-mail [email or web address]

## 2017-11-13 NOTE — Clinical Note
Of note- COPD/restrictive Lung disease added to problem list . He however is treating his lungs with smoking cigarettes in Am to help cough. He declined Smoking cessation Classes due to transportation issues. May need refill albuterol, he is unsure if inhaler is . Vision complaints- therefore [facility] appt made

## 2017-11-13 NOTE — PATIENT INSTRUCTIONS
Counseling and Referral of Other Preventative  (Italic type indicates deductible and co-insurance are waived)    Patient Name: Jesse Chow  Today's Date: 11/13/2017      SERVICE LIMITATIONS RECOMMENDATION    Vaccines    · Pneumococcal (once after 65)    · Influenza (annually)    · Hepatitis B (if medium/high risk)    · Prevnar 13      Hepatitis B medium/high risk factors:       - End-stage renal disease       - Hemophiliacs who received Factor VII or         IX concentrates       - Clients of institutions for the mentally             retarded       - Persons who live in the same house as          a HepB carrier       - Homosexual men       - Illicit injectable drug abusers     Pneumococcal: Done, no repeat necessary     Influenza: Scheduled - see appointments     Hepatitis B: N/A     Prevnar 13: N/A    Prostate cancer screening (annually to age 75)     Prostate specific antigen (PSA) Shared decision making with Provider. Sometimes a co-pay may be required if the patient decides to have this test. The USPSTF no longer recommends prostate cancer screening routinely in medicine: every 1 year    Colorectal cancer screening (to age 75)    · Fecal occult blood test (annual)  · Flexible sigmoidoscopy (5y)  · Screening colonoscopy (10y)  · Barium enema   Last done never, recommend to repeat every 10 years , but needs to finish with present [facility] treatment course before scheduling    Diabetes self-management training (no USPSTF recommendations)  Requires referral by treating physician for patient with diabetes or renal disease. 10 hours of initial DSMT sessions of no less than 30 minutes each in a continuous 12-month period. 2 hours of follow-up DSMT in subsequent years.  N/A    Glaucoma screening (no USPSTF recommendation)  Diabetes mellitus, family history   , age 50 or over    American, age 65 or over  Last done at least > 5 years, recommend to repeat every 1  years    Medical nutrition  therapy for diabetes or renal disease (no recommended schedule)  Requires referral by treating physician for patient with diabetes or renal disease or kidney transplant within the past 3 years.  Can be provided in same year as diabetes self-management training (DSMT), and CMS recommends medical nutrition therapy take place after DSMT. Up to 3 hours for initial year and 2 hours in subsequent years.  N/A    Cardiovascular screening blood tests (every 5 years)  · Fasting lipid panel  Order as a panel if possible  Done this year, repeat every year    Diabetes screening tests (at least every 3 years, Medicare covers annually or at 6-month intervals for prediabetic patients)  · Fasting blood sugar (FBS) or glucose tolerance test (GTT)  Patient must be diagnosed with one of the following:       - Hypertension       - Dyslipidemia       - Obesity (BMI 30kg/m2)       - Previous elevated impaired FBS or GTT       ... or any two of the following:       - Overweight (BMI 25 but <30)       - Family history of diabetes       - Age 65 or older       - History of gestational diabetes or birth of baby weighing more than 9 pounds  Done this year, repeat every year    Abdominal aortic aneurysm screening (once)  · Sonogram   Limited to patients who meet one of the following criteria:       - Men who are 65-75 years old and have smoked more than 100 cigarette in their lifetime       - Anyone with a family history of abdominal aortic aneurysm       - Anyone recommended for screening by the USPSTF  Recommended to patient, needs to follow up later.    HIV screening (annually for increased risk patients)  · HIV-1 and HIV-2 by EIA, or MARICARMEN, rapid antibody test or oral mucosa transudate  Patients must be at increased risk for HIV infection per USPSTF guidelines or pregnant. Tests covered annually for patient at increased risk or as requested by the patient. Pregnant patients may receive up to 3 tests during pregnancy.  Risks discussed,  screening is not recommended    Smoking cessation counseling (up to 8 sessions per year)  Patients must be asymptomatic of tobacco-related conditions to receive as a preventative service.  Counseled for 3-10 minutes.    Subsequent annual wellness visit  At least 12 months since last AWV  Return in one year     The following information is provided to all patients.  This information is to help you find resources for any of the problems found today that may be affecting your health:                Living healthy guide: www.[facility].[place].[facility]      Understanding Diabetes: www.diabetes.org      Eating healthy: www.cdc.gov/healthyweight      CDC home safety checklist: www.cdc.gov/steadi/patient.html      Agency on Aging: www.goea.[place].[facility]      Alcoholics anonymous (AA): www.aa.org      Physical Activity: www.ilana.nih.gov/gh1kdhb      Tobacco use: www.quitwithusla.org

## 2017-11-13 NOTE — PROGRESS NOTES
Chief Complaint: Penile glans discoloration    HPI:   11/13/17: 68 yo man sent by Dr. De Luna after finding that the glans is discolored lately. Takes flomax for LUTS for last 8-9 months from PCP; did have frequency that is now a lot better.  No abd/pelvic pain and no exac/rel factors.  No hematuria.  No recent urolithiasis.  No sig urinary bother.  Undergoing chemotherapy of lung cancer.  Dr. Rao dx some epid cysts parker some years ago.  PCP does annual PSA/CHERYL.    Allergies:  Patient has no known allergies.    Medications:  has a current medication list which includes the following prescription(s): albuterol, amlodipine-benazepril 5-10 mg, ondansetron, prochlorperazine, and tamsulosin.    Review of Systems:  General: No fever, chills, fatigability, or weight loss.  Skin: No rashes, itching, or changes in color or texture of skin.  Chest: Denies GRIMALDO, cyanosis, wheezing, cough, and sputum production.  Abdomen: Appetite fine. No weight loss. Denies diarrhea, abdominal pain, hematemesis, or blood in stool.  Musculoskeletal: No joint stiffness or swelling. Denies back pain.  : As above.  All other review of systems negative.    PMH:   has a past medical history of Borderline high blood pressure; Chronic bronchitis; Chronic low back pain; DDD (degenerative disc disease), lumbar (6/13/2017); H/O: Bell's palsy; H/O: Bell's palsy; Hyperlipidemia; Hypertension; Major depression; Sciatica; Squamous cell carcinoma of right lung; Stroke (2003 approx ); Tobacco abuse; and Tobacco dependence.    PSH:   has a past surgical history that includes Hemorrhoid surgery (1973 approx); Neck mass excision (Right, 2014 approx); and Bronchoscopy (08/2017).    FamHx: family history includes Diabetes in his brother, brother, and sister; Heart attack (age of onset: 87) in his father; Heart disease in his brother, father, and mother; Heart disease (age of onset: 38) in his daughter; Hypertension in his mother; Stroke in his  mother.    SocHx:  reports that he has been smoking Cigarettes.  He has a 42.00 pack-year smoking history. He has never used smokeless tobacco. He reports that he drinks alcohol. He reports that he does not use drugs.      Physical Exam:  Vitals:    11/13/17 1311   BP: (!) 149/80   Pulse: 88     General: A&Ox3, no apparent distress, no deformities  Neck: No masses, normal thyroid  Lungs: normal inspiration, no use of accessory muscles  Heart: normal pulse, no arrhythmias  Abdomen: Soft, NT, ND, no masses, no hernias, no hepatosplenomegaly  Lymphatic: Neck and groin nodes negative  Skin: The skin is warm and dry. No jaundice.  Ext: No c/c/e.  : Test desc parker, no abnormalities of epididymus. Penis normal, with normal penile and scrotal skin. Meatus normal.     Labs/Studies:   Urinalysis performed in clinic, summary: UA normal  PSA    5/17: 2.2    Impression/Plan:   1. The penile lesions that he describes are no longer present, normal exam.  2. Discussed finasteride if LUTS worsen again, PVR 0; continue flomax.

## 2017-11-14 PROBLEM — J44.9 COPD (CHRONIC OBSTRUCTIVE PULMONARY DISEASE): Status: ACTIVE | Noted: 2017-11-14

## 2017-11-14 PROBLEM — Z86.73 HISTORY OF COMPLETED STROKE: Chronic | Status: ACTIVE | Noted: 2017-11-13

## 2017-11-14 NOTE — PROGRESS NOTES
"Jesse Chow presented for a  Medicare AWV and comprehensive Health Risk Assessment today. He is accompanied by his wife. They are  but he is unable to drive and she drives him.  The following components were reviewed and updated:    · Medical history  · Family History  · Social history  · Allergies and Current Medications  · Health Risk Assessment  · Health Maintenance  · Care Team     ** See Completed Assessments for Annual Wellness Visit within the encounter summary.**       The following assessments were completed:  · Living Situation  · CAGE  · Depression Screening  · Timed Get Up and Go  · Whisper Test  · Cognitive Function Screening  · Nutrition Screening  · ADL Screening  · PAQ Screening    Physical Exam    PE:   /74 (BP Location: Left arm, Patient Position: Sitting, BP Method: Medium (Manual))   Pulse 96   Ht 5' 10" (1.778 m)   Wt 85.4 kg (188 lb 4.4 oz)   SpO2 99%   BMI 27.01 kg/m²     APPEARANCE: Patient is a 67 y.o.male /Black . Awake, alert, in no distress, fair historian.   HEENT: PERRLA, EOMI. No facial asymmetry.Tongue midline. Full dentures above.  NECK: Supple. Carotids: 2+, no bruits. No thyromegaly. No cervical adenopathy.   HEART: Regular rate and rhythm with  No murmur , rubs or gallops.   CHEST: Lungs clear to auscultation. No wheezes , crackles , rhonchi or rales.   BACK:  No spinous tenderness- none in cervical or lumbar area. . No flank tenderness.   ABDOMEN: Bowel sounds normal. Not distended. Soft.   EXTREMITIES: No clubbing, cyanosis or edema. Peripheral pulses intact.Mild varicosities  NEURO:  Hearing aide on right. Motor: Symmetric  grasp, flexion and extension of feet. Toes downgoing. Sensory intact to monofilament testing, symmetric. Finger to nose is intact with no tremor. No spasticity or muscle fasciculations. Gait: No ataxia. Some confusion and trouble with memory.' though scored 7/7 on cognitive function test.   SKIN:  No suspicious " lesions or ulcerations.         Diagnoses and health risks identified today and associated recommendations/orders:    1. Encounter for preventive health examination  Completed.    2. Squamous cell carcinoma of right lung  Ongoing problem. In midst of chemotherapy. Patient declined recommended Radiation therapy. Oncologist is Dr Rodriguez, though has seen his colleagues as well.    3. Essential hypertension  Stable and controlled on Lotrel. Continue current treatment plan as previously prescribed with your PCP.     4. Pure hypercholesterolemia  Stable and controlled with diet. Continue current treatment plan as previously prescribed with your PCP.   Component      Latest Ref Rng & Units 5/30/2017   Cholesterol      120 - 199 mg/dL 196   Triglycerides      30 - 150 mg/dL 83   HDL      40 - 75 mg/dL 35 (L)   LDL Cholesterol      63.0 - 159.0 mg/dL 144.4     5. Benign prostatic hyperplasia without lower urinary tract symptoms  Stable and controlled on tamsulosin. Continue current treatment plan as previously prescribed with your Urologist, Dr Rivera.     6. Hearing loss of right ear, unspecified hearing loss type  Stable and controlled with hearing aid.  Continue current treatment plan as previously prescribed with your PCP.     7. DDD (degenerative disc disease), lumbar  Stable and controlled. Continue current treatment plan as previously prescribed with your PCP.     8. DDD (degenerative disc disease), cervical  Stable and controlled. Continue current treatment plan as previously prescribed with your PCP.     9. Decreased vision  This is a new problem that has been identified during today's visit.  What he describes may be Posterior Vitreous detachment but he has not had an eye exam in at least 5 years. He has a history of several lacunar strokes as well.  Please follow up with your an eye doctor.  - Ambulatory consult to Ophthalmology  - 11/17/17. Dr Pacheco    10. History of completed stroke  Stable and  "controlled. 8/25/17 : MRI brain:  There are chronic lacunar infarcts on a background of findings characteristic of chronic microangiopathic ischemic disease. He thinks he was told of a stoke or of Bell's palsy in 2003. He knows that the left side of his face had problems. He and his wife are not aware the Bell's Palsy is not a central stroke. He does have evidence of Lacunar strokes on him recent MRI. No TIA symptoms now. Continue current treatment plan as previously prescribed with your physicians.     11. Chronic obstructive pulmonary disease, unspecified COPD type  Ongoing problem. He is not using any medications at this time. See # 12. PFT's from 8/11/17: Mild (small airways) obstruction.Airflow is not improved after bronchodilator. The failure to demonstrate improvement in airflow does not preclude a  clinical response to a trial of bronchodilators. Lung Volumes are consistent with mild restrictive disease. Continue to follow up with your physicians.     12. Tobacco abuse  This problem is currently not controlled.He has decreased use though still smokes in [place] to help " clear his lungs out and help with cough. Not using the albuterol or any expectorant.   Discussed smoking increases risk of Upper respiratory illness, pneumonia, URI in children exposed to second hand smoke. Increases Blood pressure. Increases risk of MI, CVA, PAD, Renal disease as well as cancer of lung, breast, kidney, throat and pancreas. Increases skin wrinkling as well. Discussed Nicotine gum, patches, antidepressants, Chantix, hypnosis; but that you have to want to quit for them to work. He declines smoking cessation classes at this time due to transportation issues.      Please follow up with your PCP or pulmonologist. as planned to discuss adjustments to your treatment plan.        Provided Jesse Chow with a 5-10 year written screening schedule and personal prevention plan. Recommendations were developed using the USPSTF age " appropriate recommendations. Education, counseling, and referrals were provided as needed. After Visit Summary printed and given to patient which includes a list of additional screenings\tests needed.    Return in about 1 year (around 11/13/2018) for annual HRA.    Sherry Garvin MD

## 2017-11-16 PROBLEM — J98.4 MIXED RESTRICTIVE AND OBSTRUCTIVE LUNG DISEASE: Status: ACTIVE | Noted: 2017-11-14

## 2017-11-16 PROBLEM — J43.9 MIXED RESTRICTIVE AND OBSTRUCTIVE LUNG DISEASE: Status: ACTIVE | Noted: 2017-11-14

## 2017-11-17 ENCOUNTER — OFFICE VISIT (OUTPATIENT)
Dept: OPHTHALMOLOGY | Facility: CLINIC | Age: 67
End: 2017-11-17
Payer: MEDICARE

## 2017-11-17 ENCOUNTER — OFFICE VISIT (OUTPATIENT)
Dept: HEMATOLOGY/ONCOLOGY | Facility: CLINIC | Age: 67
End: 2017-11-17
Payer: MEDICARE

## 2017-11-17 ENCOUNTER — INFUSION (OUTPATIENT)
Dept: INFUSION THERAPY | Facility: HOSPITAL | Age: 67
End: 2017-11-17
Attending: INTERNAL MEDICINE
Payer: MEDICARE

## 2017-11-17 VITALS
RESPIRATION RATE: 18 BRPM | HEIGHT: 70 IN | BODY MASS INDEX: 27.34 KG/M2 | HEIGHT: 70 IN | BODY MASS INDEX: 27.34 KG/M2 | OXYGEN SATURATION: 99 % | TEMPERATURE: 98 F | SYSTOLIC BLOOD PRESSURE: 137 MMHG | WEIGHT: 190.94 LBS | HEART RATE: 87 BPM | WEIGHT: 190.94 LBS | DIASTOLIC BLOOD PRESSURE: 80 MMHG

## 2017-11-17 DIAGNOSIS — R91.8 ENDOBRONCHIAL MASS: ICD-10-CM

## 2017-11-17 DIAGNOSIS — C34.91 SQUAMOUS CELL CARCINOMA OF RIGHT LUNG: Primary | ICD-10-CM

## 2017-11-17 DIAGNOSIS — H52.4 PRESBYOPIA: ICD-10-CM

## 2017-11-17 DIAGNOSIS — C77.1 CANCER OF INTRATHORACIC LYMPH NODES, SECONDARY: ICD-10-CM

## 2017-11-17 DIAGNOSIS — H25.13 NUCLEAR SCLEROSIS, BILATERAL: Primary | ICD-10-CM

## 2017-11-17 DIAGNOSIS — H25.013 CORTICAL AGE-RELATED CATARACT OF BOTH EYES: ICD-10-CM

## 2017-11-17 DIAGNOSIS — H52.223 REGULAR ASTIGMATISM OF BOTH EYES: ICD-10-CM

## 2017-11-17 PROCEDURE — S0028 INJECTION, FAMOTIDINE, 20 MG: HCPCS | Mod: PO | Performed by: INTERNAL MEDICINE

## 2017-11-17 PROCEDURE — 99999 PR PBB SHADOW E&M-EST. PATIENT-LVL III: CPT | Mod: PBBFAC,,, | Performed by: INTERNAL MEDICINE

## 2017-11-17 PROCEDURE — 99999 PR PBB SHADOW E&M-EST. PATIENT-LVL I: CPT | Mod: PBBFAC,,, | Performed by: OPTOMETRIST

## 2017-11-17 PROCEDURE — 96375 TX/PRO/DX INJ NEW DRUG ADDON: CPT | Mod: PO

## 2017-11-17 PROCEDURE — 63600175 PHARM REV CODE 636 W HCPCS: Mod: PO | Performed by: INTERNAL MEDICINE

## 2017-11-17 PROCEDURE — 96417 CHEMO IV INFUS EACH ADDL SEQ: CPT | Mod: PO

## 2017-11-17 PROCEDURE — 99499 UNLISTED E&M SERVICE: CPT | Mod: S$GLB,,, | Performed by: INTERNAL MEDICINE

## 2017-11-17 PROCEDURE — 96413 CHEMO IV INFUSION 1 HR: CPT | Mod: PO

## 2017-11-17 PROCEDURE — 25000003 PHARM REV CODE 250: Mod: PO | Performed by: INTERNAL MEDICINE

## 2017-11-17 PROCEDURE — 96367 TX/PROPH/DG ADDL SEQ IV INF: CPT | Mod: PO

## 2017-11-17 PROCEDURE — 92004 COMPRE OPH EXAM NEW PT 1/>: CPT | Mod: S$GLB,,, | Performed by: OPTOMETRIST

## 2017-11-17 PROCEDURE — 92015 DETERMINE REFRACTIVE STATE: CPT | Mod: S$GLB,,, | Performed by: OPTOMETRIST

## 2017-11-17 PROCEDURE — 99215 OFFICE O/P EST HI 40 MIN: CPT | Mod: S$GLB,,, | Performed by: INTERNAL MEDICINE

## 2017-11-17 RX ORDER — DIPHENHYDRAMINE HYDROCHLORIDE 50 MG/ML
25 INJECTION INTRAMUSCULAR; INTRAVENOUS
Status: COMPLETED | OUTPATIENT
Start: 2017-11-17 | End: 2017-11-17

## 2017-11-17 RX ORDER — SODIUM CHLORIDE 9 MG/ML
10 INJECTION, SOLUTION INTRAMUSCULAR; INTRAVENOUS; SUBCUTANEOUS
Status: CANCELLED | OUTPATIENT
Start: 2017-11-17

## 2017-11-17 RX ORDER — DIPHENHYDRAMINE HYDROCHLORIDE 50 MG/ML
50 INJECTION INTRAMUSCULAR; INTRAVENOUS ONCE AS NEEDED
Status: CANCELLED | OUTPATIENT
Start: 2017-11-17 | End: 2017-11-17

## 2017-11-17 RX ORDER — FAMOTIDINE 20 MG/50ML
20 INJECTION, SOLUTION INTRAVENOUS
Status: COMPLETED | OUTPATIENT
Start: 2017-11-17 | End: 2017-11-17

## 2017-11-17 RX ORDER — FAMOTIDINE 20 MG/50ML
20 INJECTION, SOLUTION INTRAVENOUS
Status: CANCELLED | OUTPATIENT
Start: 2017-11-17 | End: 2017-11-17

## 2017-11-17 RX ORDER — EPINEPHRINE 0.3 MG/.3ML
0.3 INJECTION SUBCUTANEOUS ONCE AS NEEDED
Status: CANCELLED | OUTPATIENT
Start: 2017-11-17 | End: 2017-11-17

## 2017-11-17 RX ORDER — HEPARIN 100 UNIT/ML
500 SYRINGE INTRAVENOUS
Status: CANCELLED | OUTPATIENT
Start: 2017-11-17

## 2017-11-17 RX ORDER — DIPHENHYDRAMINE HYDROCHLORIDE 50 MG/ML
25 INJECTION INTRAMUSCULAR; INTRAVENOUS
Status: CANCELLED
Start: 2017-11-17 | End: 2017-11-17

## 2017-11-17 RX ORDER — HEPARIN 100 UNIT/ML
500 SYRINGE INTRAVENOUS
Status: DISCONTINUED | OUTPATIENT
Start: 2017-11-17 | End: 2017-11-17 | Stop reason: HOSPADM

## 2017-11-17 RX ADMIN — FAMOTIDINE 20 MG: 20 INJECTION, SOLUTION INTRAVENOUS at 10:11

## 2017-11-17 RX ADMIN — DIPHENHYDRAMINE HYDROCHLORIDE 25 MG: 50 INJECTION INTRAMUSCULAR; INTRAVENOUS at 10:11

## 2017-11-17 RX ADMIN — DEXAMETHASONE SODIUM PHOSPHATE: 4 INJECTION, SOLUTION INTRA-ARTICULAR; INTRALESIONAL; INTRAMUSCULAR; INTRAVENOUS; SOFT TISSUE at 11:11

## 2017-11-17 RX ADMIN — HEPARIN 500 UNITS: 100 SYRINGE at 01:11

## 2017-11-17 RX ADMIN — PACLITAXEL 96 MG: 6 INJECTION, SOLUTION, CONCENTRATE INTRAVENOUS at 11:11

## 2017-11-17 RX ADMIN — SODIUM CHLORIDE: 9 INJECTION, SOLUTION INTRAVENOUS at 10:11

## 2017-11-17 RX ADMIN — CARBOPLATIN 300 MG: 10 INJECTION, SOLUTION INTRAVENOUS at 12:11

## 2017-11-17 NOTE — PROGRESS NOTES
Hematology/Oncology Office Note    CC:  Lung cancer    Referred by:  Burt De Luna MD    Diagnosis:  Squamous cell carcinoma the right lung    HPI:   The patient is a 67-year-old -American male who presents to the hematology oncology clinic today to discuss further evaluation and management recommendations for squamous cell carcinoma of the right lung.  The patient has been referred to me by Dr. Zackery Peña with pulmonary medicine.  I have reviewed all of the patient's clinical history available in the medical record and have utilized this in my evaluation and management recommendations today.  Today the patient reports that he has a chronic cough with clear sputum.  He has had this for about 3-4 months.  He denies any fevers, chills or night sweats.  He denies any loss of appetite or unintentional weight loss.  He denies any melena, hemoptysis, hematemesis, hematuria or hematochezia.  He denies any bowel or urinary complaints.  He denies any chest pain.  He does report dyspnea with exertion.  He reports occasional headaches.    I have reviewed and updated the HPI, ROS, PMHx, Social Hx, Family Hx and treatment history.    Today's visit:  Patient presents today for next cycle of weekly carboplatin/Taxol.  He has no complaints today and specifically denies fever, chills, night sweats or weight loss.    Past Medical History:   Diagnosis Date    Borderline high blood pressure     Chronic bronchitis     Chronic low back pain     DDD (degenerative disc disease), lumbar 6/13/2017    H/O: Bell's palsy     H/O: Bell's palsy     Hyperlipidemia     Hypertension     Major depression     no meds now    Sciatica     Squamous cell carcinoma of right lung     Stroke 2003 approx     Lt side of face/ ? [place] palsy then also. later +lacunar infarcts on MRi    Tobacco abuse     Tobacco dependence          Social History:  No tobacco, alcohol, or illicit drugs      Family History: family history  includes Diabetes in his brother, brother, and sister; Heart attack (age of onset: 87) in his father; Heart disease in his brother, father, and mother; Heart disease (age of onset: 38) in his daughter; Hypertension in his mother; Stroke in his mother.        HPI    Review of Systems   Constitutional: Negative for activity change, appetite change, fatigue, fever and unexpected weight change.   HENT: Negative for congestion, ear discharge, facial swelling, nosebleeds, rhinorrhea, sneezing and sore throat.    Eyes: Negative.    Respiratory: Negative for apnea, cough and shortness of breath.    Cardiovascular: Negative for chest pain, palpitations and leg swelling.   Gastrointestinal: Negative for abdominal distention, blood in stool, constipation, diarrhea, nausea and vomiting.   Endocrine: Negative.    Genitourinary: Negative for decreased urine volume, difficulty urinating, enuresis, frequency, genital sores, hematuria, testicular pain and urgency.   Musculoskeletal: Negative for arthralgias, gait problem, joint swelling, myalgias, neck pain and neck stiffness.   Skin: Negative for color change, pallor, rash and wound.   Allergic/Immunologic: Negative.    Neurological: Negative for dizziness, facial asymmetry, speech difficulty, light-headedness, numbness and headaches.   Hematological: Negative for adenopathy. Does not bruise/bleed easily.   Psychiatric/Behavioral: Negative for agitation, confusion, decreased concentration, dysphoric mood and hallucinations. The patient is not nervous/anxious and is not hyperactive.        Objective:      Physical Exam   Constitutional: He is oriented to person, place, and time. He appears well-developed and well-nourished. No distress.   HENT:   Head: Normocephalic and atraumatic.   Nose: Nose normal.   Mouth/Throat: Oropharynx is clear and moist. No oropharyngeal exudate.   Eyes: Conjunctivae and EOM are normal. Pupils are equal, round, and reactive to light. Right eye exhibits no  discharge. Left eye exhibits no discharge. No scleral icterus.   Neck: Normal range of motion. Neck supple. No tracheal deviation present. No thyromegaly present.   Cardiovascular: Normal rate and regular rhythm.  Exam reveals no gallop and no friction rub.    No murmur heard.  Pulmonary/Chest: Effort normal and breath sounds normal. No respiratory distress. He has no wheezes. He has no rales. He exhibits no tenderness.   Abdominal: Soft. Bowel sounds are normal. He exhibits no distension. There is no tenderness.   Musculoskeletal: Normal range of motion. He exhibits no edema, tenderness or deformity.   Lymphadenopathy:     He has no cervical adenopathy.   Neurological: He is alert and oriented to person, place, and time. He displays normal reflexes. No cranial nerve deficit. He exhibits normal muscle tone. Coordination normal.   Skin: Skin is warm and dry. Capillary refill takes less than 2 seconds. No rash noted.   Psychiatric: He has a normal mood and affect. Thought content normal.   Vitals reviewed.      Lab Results   Component Value Date    WBC 7.85 11/17/2017    HGB 11.3 (L) 11/17/2017    HCT 34.2 (L) 11/17/2017    MCV 80 (L) 11/17/2017     11/17/2017     Lab Results   Component Value Date    CREATININE 0.7 11/10/2017    BUN 4 (L) 11/10/2017     (L) 11/10/2017    K 4.8 11/10/2017    CL 99 11/10/2017    CO2 26 11/10/2017     Lab Results   Component Value Date    ALT 19 11/10/2017    AST 16 11/10/2017    ALKPHOS 89 11/10/2017    BILITOT 0.4 11/10/2017       Assessment:       67-year-old gentleman with squamous cell carcinoma of the lung clinically stage III who is currently receiving weekly carboplatin/Taxol.  The patient has refused radiation therapy and would like to continue chemotherapy only at the current doses.  I am seeing the patient today in Dr. De Luna's absence.  He will follow-up next week with CBC, CMP and appointment for weekly carboplatin/Taxol      Stage III squamous cell  carcinoma the right lung:  --Check labs today and proceed with weekly carboplatin/Taxol accordingly  --Patient continues to refuse radiation therapy  --Patient desires to proceed with same dose of Taxol  --Follow-up with primary oncologist Dr. De Luna in 1 week

## 2017-11-17 NOTE — PROGRESS NOTES
HPI     Eye Exam    Additional comments: Yearly           Comments   NP to DNL.  HPI    Any vision changes since last exam: Yes  Eye pain: No  Other ocular symptoms: No    Do you wear currently wear glasses or contacts? None    Interested in contacts today? No    Do you plan on getting new glasses today? Yes       Last edited by Melissa Kaye, PCT on 11/17/2017  8:33 AM.   (History)            Assessment /Plan     For exam results, see Encounter Report.    Nuclear sclerosis, bilateral  Surgery is not indicated at this time. Monitor 12 months.    Cortical age-related cataract of both eyes  Eyeglass Final Rx     Eyeglass Final Rx       Sphere Cylinder Axis Add    Right +0.25 +1.00 180 +2.50    Left [place] +1.50 180 +2.50    Expiration Date:  11/18/2018                RTC 1 yr for dilated eye exam or PRN if any problems.   Discussed above and answered questions.

## 2017-11-17 NOTE — PROGRESS NOTES
HPI     Eye Exam    Additional comments: Yearly           Comments   NP to DNL.  HPI    Any vision changes since last exam: Yes  Eye pain: No  Other ocular symptoms: No    Do you wear currently wear glasses or contacts? None    Interested in contacts today? No    Do you plan on getting new glasses today? Yes       Last edited by Melissa Kaye, PCT on 11/17/2017  8:33 AM.   (History)            Assessment /Plan     For exam results, see Encounter Report.    Nuclear sclerosis, bilateral  Cortical age-related cataract of both eyes  Surgery is not indicated at this time. Monitor 12 months.    Regular astigmatism of both eyes  Presbyopia  Eyeglass Final Rx     Eyeglass Final Rx       Sphere Cylinder Axis Add    Right +0.25 +1.00 180 +2.50    Left [place] +1.50 180 +2.50    Expiration Date:  11/18/2018              RTC 1 yr for dilated eye exam or PRN if any problems.   Discussed above and answered questions.

## 2017-11-17 NOTE — LETTER
November 17, 2017      Burt De Luna MD  [address] [place] [address]           [facility] - Hemotology Oncology  [address] [place] [address]  Phone: [phone]  Fax: [phone]          Patient: Jesse Chow   MR Number: [id number]   YOB: 1950   Date of Visit: 11/17/2017       Dear Dr. Burt De Luna:    Thank you for referring Jesse Chow to me for evaluation. Attached you will find relevant portions of my assessment and plan of care.    If you have questions, please do not hesitate to call me. I look forward to following Jesse Chow along with you.    Sincerely,    Varghese Welch Jr., MD    Enclosure  CC:  No Recipients    If you would like to receive this communication electronically, please contact [email or web address] or [phone] to request more information on [employer] access.    For providers and/or their staff who would like to refer a patient to [facility], please contact us through our one-stop-shop provider referral line, [facility], at [phone].    If you feel you have received this communication in error or would no longer like to receive these types of communications, please e-mail [email or web address]

## 2017-11-24 ENCOUNTER — OFFICE VISIT (OUTPATIENT)
Dept: HEMATOLOGY/ONCOLOGY | Facility: CLINIC | Age: 67
End: 2017-11-24
Payer: MEDICARE

## 2017-11-24 ENCOUNTER — INFUSION (OUTPATIENT)
Dept: INFUSION THERAPY | Facility: HOSPITAL | Age: 67
End: 2017-11-24
Attending: INTERNAL MEDICINE
Payer: MEDICARE

## 2017-11-24 VITALS
OXYGEN SATURATION: 98 % | TEMPERATURE: 97 F | HEART RATE: 80 BPM | SYSTOLIC BLOOD PRESSURE: 134 MMHG | DIASTOLIC BLOOD PRESSURE: 82 MMHG | RESPIRATION RATE: 20 BRPM | WEIGHT: 191.56 LBS | BODY MASS INDEX: 27.42 KG/M2 | HEIGHT: 70 IN

## 2017-11-24 VITALS
SYSTOLIC BLOOD PRESSURE: 139 MMHG | TEMPERATURE: 98 F | DIASTOLIC BLOOD PRESSURE: 47 MMHG | OXYGEN SATURATION: 96 % | RESPIRATION RATE: 16 BRPM | HEART RATE: 68 BPM

## 2017-11-24 DIAGNOSIS — C34.91 SQUAMOUS CELL CARCINOMA OF RIGHT LUNG: Primary | Chronic | ICD-10-CM

## 2017-11-24 DIAGNOSIS — C34.91 SQUAMOUS CELL CARCINOMA OF RIGHT LUNG: Primary | ICD-10-CM

## 2017-11-24 PROCEDURE — 96417 CHEMO IV INFUS EACH ADDL SEQ: CPT

## 2017-11-24 PROCEDURE — 63600175 PHARM REV CODE 636 W HCPCS: Performed by: INTERNAL MEDICINE

## 2017-11-24 PROCEDURE — 96367 TX/PROPH/DG ADDL SEQ IV INF: CPT

## 2017-11-24 PROCEDURE — 99215 OFFICE O/P EST HI 40 MIN: CPT | Mod: 25,S$GLB,, | Performed by: INTERNAL MEDICINE

## 2017-11-24 PROCEDURE — 96413 CHEMO IV INFUSION 1 HR: CPT

## 2017-11-24 PROCEDURE — 96375 TX/PRO/DX INJ NEW DRUG ADDON: CPT

## 2017-11-24 PROCEDURE — S0028 INJECTION, FAMOTIDINE, 20 MG: HCPCS | Performed by: INTERNAL MEDICINE

## 2017-11-24 PROCEDURE — 25000003 PHARM REV CODE 250: Performed by: INTERNAL MEDICINE

## 2017-11-24 PROCEDURE — 99499 UNLISTED E&M SERVICE: CPT | Mod: S$GLB,,, | Performed by: INTERNAL MEDICINE

## 2017-11-24 PROCEDURE — 99999 PR PBB SHADOW E&M-EST. PATIENT-LVL III: CPT | Mod: PBBFAC,,, | Performed by: INTERNAL MEDICINE

## 2017-11-24 RX ORDER — DIPHENHYDRAMINE HYDROCHLORIDE 50 MG/ML
50 INJECTION INTRAMUSCULAR; INTRAVENOUS ONCE AS NEEDED
Status: CANCELLED | OUTPATIENT
Start: 2017-11-24 | End: 2017-11-24

## 2017-11-24 RX ORDER — DIPHENHYDRAMINE HYDROCHLORIDE 50 MG/ML
25 INJECTION INTRAMUSCULAR; INTRAVENOUS
Status: CANCELLED | OUTPATIENT
Start: 2017-11-24 | End: 2017-11-24

## 2017-11-24 RX ORDER — FAMOTIDINE 20 MG/50ML
20 INJECTION, SOLUTION INTRAVENOUS
Status: COMPLETED | OUTPATIENT
Start: 2017-11-24 | End: 2017-11-24

## 2017-11-24 RX ORDER — HEPARIN 100 UNIT/ML
500 SYRINGE INTRAVENOUS
Status: CANCELLED | OUTPATIENT
Start: 2017-11-24

## 2017-11-24 RX ORDER — EPINEPHRINE 0.3 MG/.3ML
0.3 INJECTION SUBCUTANEOUS ONCE AS NEEDED
Status: CANCELLED | OUTPATIENT
Start: 2017-11-24 | End: 2017-11-24

## 2017-11-24 RX ORDER — DIPHENHYDRAMINE HYDROCHLORIDE 50 MG/ML
25 INJECTION INTRAMUSCULAR; INTRAVENOUS
Status: COMPLETED | OUTPATIENT
Start: 2017-11-24 | End: 2017-11-24

## 2017-11-24 RX ORDER — HEPARIN 100 UNIT/ML
500 SYRINGE INTRAVENOUS
Status: DISCONTINUED | OUTPATIENT
Start: 2017-11-24 | End: 2017-11-24 | Stop reason: HOSPADM

## 2017-11-24 RX ORDER — FAMOTIDINE 20 MG/50ML
20 INJECTION, SOLUTION INTRAVENOUS
Status: CANCELLED | OUTPATIENT
Start: 2017-11-24 | End: 2017-11-24

## 2017-11-24 RX ORDER — SODIUM CHLORIDE 9 MG/ML
10 INJECTION, SOLUTION INTRAMUSCULAR; INTRAVENOUS; SUBCUTANEOUS
Status: CANCELLED | OUTPATIENT
Start: 2017-11-24

## 2017-11-24 RX ORDER — POLYETHYLENE GLYCOL 3350 17 G/17G
17 POWDER, FOR SOLUTION ORAL DAILY
Qty: 510 G | Refills: 2 | Status: SHIPPED | OUTPATIENT
Start: 2017-11-24 | End: 2018-01-11

## 2017-11-24 RX ADMIN — FAMOTIDINE 20 MG: 20 INJECTION, SOLUTION INTRAVENOUS at 01:11

## 2017-11-24 RX ADMIN — PACLITAXEL 96 MG: 6 INJECTION, SOLUTION, CONCENTRATE INTRAVENOUS at 02:11

## 2017-11-24 RX ADMIN — HEPARIN 500 UNITS: 100 SYRINGE at 04:11

## 2017-11-24 RX ADMIN — DIPHENHYDRAMINE HYDROCHLORIDE 25 MG: 50 INJECTION INTRAMUSCULAR; INTRAVENOUS at 01:11

## 2017-11-24 RX ADMIN — CARBOPLATIN 300 MG: 10 INJECTION, SOLUTION INTRAVENOUS at 03:11

## 2017-11-24 RX ADMIN — DEXAMETHASONE SODIUM PHOSPHATE: 4 INJECTION, SOLUTION INTRA-ARTICULAR; INTRALESIONAL; INTRAMUSCULAR; INTRAVENOUS; SOFT TISSUE at 02:11

## 2017-11-24 NOTE — PATIENT INSTRUCTIONS
[facility]  [address]  [address] [address]  [phone] phone     [phone] fax  Hours of Operation: Monday- Friday 8:00am- 5:00pm  After hours phone  [phone]  Hematology / Oncology Physicians on call      Dr. Glen Welch                        Please call with any concerns regarding your appointment today.  HOME CARE AFTER CHEMOTHERAPY   Meals   Many patients feel sick and lose their appetites during treatment. Eat small meals several times a day. Choose bland foods with little taste or smell if you have problems with nausea. Be sure to cook all food thoroughly. This kills bacteria and helps you avoid intestinal infection. Soft foods are easier to swallow and digest.   Activity   Exercise keeps you strong and keeps your heart and lungs active. Talk to your doctor about an appropriate exercise program for you.   Skin Care   To prevent a skin infection, bathe or shower once a day. Use a moisturizing soap and wash with warm water. Avoid very hot or cold water. Chemotherapy can make your skin dry . Apply moisturizing lotion to help relieve dry skin. Some drugs used in high doses can cause slight burns to appear (like sunburn). Ask for a special cream to help relieve the burn and protect your skin.   Prevent Mouth Sores   During chemotherapy, many people get mouth sores. Do the following to help prevent mouth sores or to ease discomfort.   Brush your teeth with a soft-bristle toothbrush after every meal.  Don't use dental floss if your platelet count is below 50,000. Your doctor or nurse will tell you if this is the case.  Use an oral swab or special soft toothbrush if your gums bleed during regular brushing.  Use mouthwash as directed. If you can't tolerate commercial mouthwash, use salt and baking soda to clean your mouth. Mix 1 teaspoon of salt and 1 teaspoon of baking soda into a glass of water. Swish and spit.  Call your doctor  or return to this facility if you develop any of the following:   Sore throat   White patches in the mouth or throat   Fever of 100.4ºF (38ºC) or higher, or as directed by your healthcare provider  © 2000-2011 [facility], [address] [place] [id number]. All rights reserved. This information is not intended as a substitute for professional medical care. Always follow your healthcare professional's   FALL PREVENTION   Falls often occur due to slipping, tripping or losing your balance. Here are ways to reduce your risk of falling again.   Was there anything that caused your fall that can be fixed, removed or replaced?   Make your home safe by keeping walkways clear of objects you may trip over.   Use non-slip pads under rugs.   Do not walk in poorly lit areas.   Do not stand on chairs or wobbly ladders.   Use caution when reaching overhead or looking upward. This position can cause a loss of balance.   Be sure your shoes fit properly, have non-slip bottoms and are in good condition.   Be cautious when going up and down stairs, curbs, and when walking on uneven sidewalks.   If your balance is poor, consider using a cane or walker.   If your fall was related to alcohol use, stop or limit alcohol intake.   If your fall was related to use of sleeping medicines, talk to your doctor about this. You may need to reduce your dosage at bedtime if you awaken during the night to go to the bathroom.   To reduce the need for nighttime bathroom trips:   Avoid drinking fluids for several hours before going to bed   Empty your bladder before going to bed   Men can keep a urinal at the bedside   © 2000-2011 [facility], [address] [place] [id number]. All rights reserved. This information is not intended as a substitute for professional medical care. Always follow your healthcare professional's instructions.  WAYS TO HELP PREVENT INFECTION         WASH YOUR HANDS OFTEN DURING THE DAY, ESPECIALLY BEFORE  YOU EAT, AFTER USING THE BATHROOM, AND AFTER TOUCHING ANIMALS     STAY AWAY FROM PEOPLE WHO HAVE ILLNESSES YOU CAN CATCH; SUCH AS COLDS, FLU, CHICKEN POX     TRY TO AVOID CROWDS     STAY AWAY FROM CHILDREN WHO RECENTLY HAVE RECEIVED LIVE VIRUS VACCINES     MAINTAIN GOOD MOUTH CARE     DO NOT SQUEEZE OR SCRATCH PIMPLES     CLEAN CUTS & SCRAPES RIGHT AWAY AND DAILY UNTIL HEALED WITH WARM WATER, SOAP & AN ANTISEPTIC     AVOID CONTACT WITH LITTER BOXES, BIRD CAGES, & FISH TANKS     AVOID STANDING WATER, IE., BIRD BATHS, FLOWER POTS/VASES, OR HUMIDIFIERS     WEAR GLOVES WHEN GARDENING OR CLEANING UP AFTER OTHERS, ESPECIALLY BABIES & SMALL CHILDREN     DO NOT EAT RAW FISH, SEAFOOD, MEAT, OR EGGS

## 2017-11-24 NOTE — PLAN OF CARE
Problem: Patient Care Overview  Goal: Plan of Care Review  Outcome: Ongoing (interventions implemented as appropriate)  im feeling really good today

## 2017-11-24 NOTE — PROGRESS NOTES
Reason for visit: Squamous cell carcinoma of the right lung  Date of Diagnosis: August 22, 2017    HPI:   The patient is a 67-year-old -American male who presents to the hematology oncology clinic today to discuss further evaluation and management recommendations for squamous cell carcinoma of the right lung.  The patient has been referred to me by Dr. Zackery Peña with pulmonary medicine.  I have reviewed all of the patient's clinical history available in the medical record and have utilized this in my evaluation and management recommendations today.  Today the patient reports that he has a chronic cough with clear sputum.  He has had this for about 4-5 months.  He denies any fevers, chills or night sweats.  He denies any loss of appetite or unintentional weight loss.  He denies any melena, hemoptysis, hematemesis, hematuria or hematochezia.  He denies any bowel or urinary complaints.  He denies any chest pain.  He does report dyspnea with exertion.  He reports occasional headaches.    PAST MEDICAL HISTORY:   1.  Hypertension  2.  Dyslipidemia  3.  Bell's palsy  4.  Osteoarthritis/degenerative disc disease  5.  BPH  6.  Hearing impaired  7.  Vitamin D deficiency  8.  Depression    SURGICAL HISTORY:   1.  Hemorrhoidectomy  2.  Right neck mass excision which was benign    FAMILY HISTORY: He denies any immediate family members with cancer or bleeding/clotting disorders.    SOCIAL HISTORY: He reports of 40-pack-year smoking history and quit in August 2017.  He reports taking alcohol socially.  He has never used any recreational drugs.  He is medically disabled.  He is .    ALLERGIES: [NKDA]    MEDICATIONS: [Medcard has been reviewed and/or reconciled.]    REVIEW OF SYSTEMS:   GENERAL: [No fevers, chills or sweats. No fatigue, weight loss or loss of appetite.]  HEENT: [No blurred vision, tinnitus, nasal discharge, sorethroat or dysphagia.]  HEART: [No chest pain, palpitations or shortness of breath.]     LUNGS: [Reports cough. Denies hemoptysis or breathing problems.]  ABDOMEN: [No abdominal pain, nausea, vomiting, diarrhea, constipation or melena.]  GENITOURINARY: [No dysuria, bleeding or malodorous discharge.]  NEURO: [No headache, dizziness or vertigo.]  HEMATOLOGY: [No easy bruising, spontaneous bleeding or blood clots in the past].  MUSCULOSKELETAL: [No arthralgias, myalgias or bone pains.]  SKIN: [No rashes or skin lesions.]  PSYCHIATRY: [No depression. Reports anxiety.]    PHYSICAL EXAMINATION:   VS: Reviewed on nurse's notes.  APPEARANCE: The patient is a well-developed, well-nourished and well-groomed -American male who appears in no acute distress.    HEENT: No scleral icterus. Both external auditory canals clear. No oral ulcers, lesions. Throat clear  HEAD: No sinus tenderness.  NECK: Supple. No palpable lymphadenopathy. Thyroid non-tender, no palpable masses  CHEST: Scattered rales.  Scattered wheezes.  No rhonchi. Unlabored respirations.  CARDIOVASCULAR: Normal S1, S2. Normal rate. Regular rhythm.  ABDOMEN: Bowel sounds normal. No tenderness. No abdominal distention. No hepatomegaly. No splenomegaly.  : Deferred today.  LYMPHATIC: No palpable supraclavicular, axillary nodes  EXTREMITIES: No clubbing, cyanosis, edema  SKIN: No lesions. No petechiae. No ecchymoses. No induration or nodules  NEUROLOGIC: No focal findings. Alert & Oriented x 3. Mood appropriate to affect    LABS:   Reviewed    IMAGING:  Reviewed    IMPRESSION:  1.  Squamous cell carcinoma of the right lung    PLAN:  1.  I had a detailed discussion with the patient today with regard to the diagnosis, prognosis and treatment for his squamous cell carcinoma of the right lung.  2.  MRI of the brain done on August 25, 2017 does not show any evidence of metastatic disease .  I reviewed the results of his recent lab work with him in detail as well.  He expressed understanding.  3. Proceed with cycle 2 week 3 of carboplatin/Taxol  today.  4.  The patient stated that he had a lot of pain when he underwent simulation for XRT.  He reports that the pain has since resolved.  However he does not want to proceed with definitive radiation therapy at this time.  He understands risks/benefits including the possibility that we may not be able to cure his malignancy if we are unable to add definitive radiation therapy to his chemotherapy.  I have also offered him a referral to a different radiation oncology facility if he does not want to undergo radiation therapy at the [facility].  However the patient stated that he does not want to do this at this time.  He stated that he will let me know if he changes his mind.  The patient also reported his concerns with regard to something having possibly gone wrong with one of his chemotherapy infusions.  I have discussed with him that I have checked the treatment plan and everything seems to have been given accurately as ordered.  I did have our chemotherapy nurse supervisor look into his concerns as well.  He expressed understanding.  He is agreeable to continue his chemotherapy.  5.  Results of PET/CT scan from October 18, 2017 shows interval improvement in disease burden.  I have discussed the option of increasing his dose of Taxol to 80 mg/m² as he has decided not to get radiation at this time.  He understands that at this time the focus is essentially disease control and not cure based on his decision not to proceed with radiation therapy.  However at this time the patient would like to keep his dose of Taxol at 45 mg/m² and will let me know if he changes his mind with regard to this.  He understands risks/benefits.    Follow-up in 2 weeks with labs for cycle 3 week 1 of carboplatin/Taxol.  I will plan on obtaining PET/CT scan for reevaluation of his disease in Dec 2017. He knows to call sooner if any new problems or questions.      Burt De Luna MD

## 2017-12-05 ENCOUNTER — TELEPHONE (OUTPATIENT)
Dept: HEMATOLOGY/ONCOLOGY | Facility: CLINIC | Age: 67
End: 2017-12-05

## 2017-12-05 NOTE — TELEPHONE ENCOUNTER
MSW intern attempted to contact pt today but the call was not answered. Intern will attempt to contact pt again tmrw. The call was concerning a f/u since last encounter and to provide pt with SW contact info in case needs arise since intern will be leaving internship on Dec. 6, 2017.

## 2017-12-15 ENCOUNTER — LAB VISIT (OUTPATIENT)
Dept: LAB | Facility: HOSPITAL | Age: 67
End: 2017-12-15
Attending: INTERNAL MEDICINE
Payer: MEDICARE

## 2017-12-15 ENCOUNTER — OFFICE VISIT (OUTPATIENT)
Dept: HEMATOLOGY/ONCOLOGY | Facility: CLINIC | Age: 67
End: 2017-12-15
Payer: MEDICARE

## 2017-12-15 ENCOUNTER — INFUSION (OUTPATIENT)
Dept: INFUSION THERAPY | Facility: HOSPITAL | Age: 67
End: 2017-12-15
Attending: INTERNAL MEDICINE
Payer: MEDICARE

## 2017-12-15 VITALS
DIASTOLIC BLOOD PRESSURE: 60 MMHG | HEART RATE: 81 BPM | WEIGHT: 193.13 LBS | TEMPERATURE: 98 F | HEIGHT: 70 IN | RESPIRATION RATE: 18 BRPM | OXYGEN SATURATION: 100 % | SYSTOLIC BLOOD PRESSURE: 110 MMHG | BODY MASS INDEX: 27.65 KG/M2

## 2017-12-15 VITALS
SYSTOLIC BLOOD PRESSURE: 111 MMHG | DIASTOLIC BLOOD PRESSURE: 71 MMHG | WEIGHT: 193 LBS | HEIGHT: 70 IN | OXYGEN SATURATION: 96 % | HEART RATE: 67 BPM | RESPIRATION RATE: 18 BRPM | BODY MASS INDEX: 27.63 KG/M2 | TEMPERATURE: 99 F

## 2017-12-15 DIAGNOSIS — C34.91 SQUAMOUS CELL CARCINOMA OF RIGHT LUNG: Primary | Chronic | ICD-10-CM

## 2017-12-15 DIAGNOSIS — C34.91 SQUAMOUS CELL CARCINOMA OF RIGHT LUNG: Primary | ICD-10-CM

## 2017-12-15 DIAGNOSIS — C34.91 SQUAMOUS CELL CARCINOMA OF RIGHT LUNG: Chronic | ICD-10-CM

## 2017-12-15 LAB
ALBUMIN SERPL BCP-MCNC: 3.6 G/DL
ALP SERPL-CCNC: 76 U/L
ALT SERPL W/O P-5'-P-CCNC: 13 U/L
ANION GAP SERPL CALC-SCNC: 8 MMOL/L
AST SERPL-CCNC: 14 U/L
BASOPHILS # BLD AUTO: 0.02 K/UL
BASOPHILS NFR BLD: 0.3 %
BILIRUB SERPL-MCNC: 0.3 MG/DL
BUN SERPL-MCNC: 8 MG/DL
CALCIUM SERPL-MCNC: 9 MG/DL
CHLORIDE SERPL-SCNC: 99 MMOL/L
CO2 SERPL-SCNC: 27 MMOL/L
CREAT SERPL-MCNC: 0.7 MG/DL
DIFFERENTIAL METHOD: ABNORMAL
EOSINOPHIL # BLD AUTO: 0.1 K/UL
EOSINOPHIL NFR BLD: 0.9 %
ERYTHROCYTE [DISTWIDTH] IN BLOOD BY AUTOMATED COUNT: 18.3 %
EST. GFR  (AFRICAN AMERICAN): >60 ML/MIN/1.73 M^2
EST. GFR  (NON AFRICAN AMERICAN): >60 ML/MIN/1.73 M^2
GLUCOSE SERPL-MCNC: 84 MG/DL
HCT VFR BLD AUTO: 34.2 %
HGB BLD-MCNC: 11.3 G/DL
LYMPHOCYTES # BLD AUTO: 2.6 K/UL
LYMPHOCYTES NFR BLD: 35 %
MCH RBC QN AUTO: 27.1 PG
MCHC RBC AUTO-ENTMCNC: 33 G/DL
MCV RBC AUTO: 82 FL
MONOCYTES # BLD AUTO: 0.9 K/UL
MONOCYTES NFR BLD: 12.7 %
NEUTROPHILS # BLD AUTO: 3.8 K/UL
NEUTROPHILS NFR BLD: 51.1 %
PLATELET # BLD AUTO: 203 K/UL
PMV BLD AUTO: 8.8 FL
POTASSIUM SERPL-SCNC: 4.2 MMOL/L
PROT SERPL-MCNC: 7 G/DL
RBC # BLD AUTO: 4.17 M/UL
SODIUM SERPL-SCNC: 134 MMOL/L
WBC # BLD AUTO: 7.42 K/UL

## 2017-12-15 PROCEDURE — 99215 OFFICE O/P EST HI 40 MIN: CPT | Mod: 25,S$GLB,, | Performed by: INTERNAL MEDICINE

## 2017-12-15 PROCEDURE — 36415 COLL VENOUS BLD VENIPUNCTURE: CPT | Mod: PO

## 2017-12-15 PROCEDURE — 99499 UNLISTED E&M SERVICE: CPT | Mod: S$GLB,,, | Performed by: INTERNAL MEDICINE

## 2017-12-15 PROCEDURE — A4216 STERILE WATER/SALINE, 10 ML: HCPCS | Mod: PO | Performed by: INTERNAL MEDICINE

## 2017-12-15 PROCEDURE — 96417 CHEMO IV INFUS EACH ADDL SEQ: CPT | Mod: PO

## 2017-12-15 PROCEDURE — 96413 CHEMO IV INFUSION 1 HR: CPT | Mod: PO

## 2017-12-15 PROCEDURE — 96375 TX/PRO/DX INJ NEW DRUG ADDON: CPT | Mod: PO

## 2017-12-15 PROCEDURE — 80053 COMPREHEN METABOLIC PANEL: CPT | Mod: PO

## 2017-12-15 PROCEDURE — 25000003 PHARM REV CODE 250: Mod: PO | Performed by: INTERNAL MEDICINE

## 2017-12-15 PROCEDURE — 96367 TX/PROPH/DG ADDL SEQ IV INF: CPT | Mod: PO

## 2017-12-15 PROCEDURE — S0028 INJECTION, FAMOTIDINE, 20 MG: HCPCS | Mod: PO | Performed by: INTERNAL MEDICINE

## 2017-12-15 PROCEDURE — 99999 PR PBB SHADOW E&M-EST. PATIENT-LVL III: CPT | Mod: PBBFAC,,, | Performed by: INTERNAL MEDICINE

## 2017-12-15 PROCEDURE — 85025 COMPLETE CBC W/AUTO DIFF WBC: CPT | Mod: PO

## 2017-12-15 PROCEDURE — 63600175 PHARM REV CODE 636 W HCPCS: Mod: PO | Performed by: INTERNAL MEDICINE

## 2017-12-15 RX ORDER — HEPARIN 100 UNIT/ML
500 SYRINGE INTRAVENOUS
Status: CANCELLED | OUTPATIENT
Start: 2017-12-15

## 2017-12-15 RX ORDER — HEPARIN 100 UNIT/ML
500 SYRINGE INTRAVENOUS
Status: DISCONTINUED | OUTPATIENT
Start: 2017-12-15 | End: 2017-12-15 | Stop reason: HOSPADM

## 2017-12-15 RX ORDER — FAMOTIDINE 20 MG/50ML
20 INJECTION, SOLUTION INTRAVENOUS
Status: COMPLETED | OUTPATIENT
Start: 2017-12-15 | End: 2017-12-15

## 2017-12-15 RX ORDER — DIPHENHYDRAMINE HYDROCHLORIDE 50 MG/ML
50 INJECTION INTRAMUSCULAR; INTRAVENOUS ONCE AS NEEDED
Status: CANCELLED | OUTPATIENT
Start: 2017-12-15 | End: 2017-12-15

## 2017-12-15 RX ORDER — SODIUM CHLORIDE 9 MG/ML
10 INJECTION, SOLUTION INTRAMUSCULAR; INTRAVENOUS; SUBCUTANEOUS
Status: DISCONTINUED | OUTPATIENT
Start: 2017-12-15 | End: 2017-12-15 | Stop reason: HOSPADM

## 2017-12-15 RX ORDER — EPINEPHRINE 0.3 MG/.3ML
0.3 INJECTION SUBCUTANEOUS ONCE AS NEEDED
Status: CANCELLED | OUTPATIENT
Start: 2017-12-15 | End: 2017-12-15

## 2017-12-15 RX ORDER — DIPHENHYDRAMINE HYDROCHLORIDE 50 MG/ML
25 INJECTION INTRAMUSCULAR; INTRAVENOUS
Status: CANCELLED | OUTPATIENT
Start: 2017-12-15 | End: 2017-12-15

## 2017-12-15 RX ORDER — DIPHENHYDRAMINE HYDROCHLORIDE 50 MG/ML
25 INJECTION INTRAMUSCULAR; INTRAVENOUS
Status: COMPLETED | OUTPATIENT
Start: 2017-12-15 | End: 2017-12-15

## 2017-12-15 RX ORDER — SODIUM CHLORIDE 9 MG/ML
10 INJECTION, SOLUTION INTRAMUSCULAR; INTRAVENOUS; SUBCUTANEOUS
Status: CANCELLED | OUTPATIENT
Start: 2017-12-15

## 2017-12-15 RX ORDER — FAMOTIDINE 20 MG/50ML
20 INJECTION, SOLUTION INTRAVENOUS
Status: CANCELLED | OUTPATIENT
Start: 2017-12-15 | End: 2017-12-15

## 2017-12-15 RX ADMIN — SODIUM CHLORIDE, PRESERVATIVE FREE 10 ML: 5 INJECTION INTRAVENOUS at 09:12

## 2017-12-15 RX ADMIN — DIPHENHYDRAMINE HYDROCHLORIDE 25 MG: 50 INJECTION INTRAMUSCULAR; INTRAVENOUS at 09:12

## 2017-12-15 RX ADMIN — DEXAMETHASONE SODIUM PHOSPHATE: 4 INJECTION, SOLUTION INTRA-ARTICULAR; INTRALESIONAL; INTRAMUSCULAR; INTRAVENOUS; SOFT TISSUE at 09:12

## 2017-12-15 RX ADMIN — FAMOTIDINE 20 MG: 20 INJECTION, SOLUTION INTRAVENOUS at 09:12

## 2017-12-15 RX ADMIN — CARBOPLATIN 300 MG: 10 INJECTION, SOLUTION INTRAVENOUS at 11:12

## 2017-12-15 RX ADMIN — PACLITAXEL 168 MG: 6 INJECTION, SOLUTION, CONCENTRATE INTRAVENOUS at 10:12

## 2017-12-15 RX ADMIN — HEPARIN 500 UNITS: 100 SYRINGE at 12:12

## 2017-12-15 NOTE — PLAN OF CARE
Problem: Chemotherapy Effects (Adult)  Intervention: Monitor/Manage Chemotherapy Gastrointestinal Effects  Pt. Given antinausea and steroids to prevent nausea and vomiting.

## 2017-12-15 NOTE — PROGRESS NOTES
Reason for visit: Squamous cell carcinoma of the right lung  Date of Diagnosis: August 22, 2017    HPI:   The patient is a 67-year-old -American male who presents to the hematology oncology clinic today to discuss further evaluation and management recommendations for squamous cell carcinoma of the right lung.  The patient has been referred to me by Dr. Zackery Peña with pulmonary medicine.  I have reviewed all of the patient's clinical history available in the medical record and have utilized this in my evaluation and management recommendations today.  Today the patient reports that he feels well.  He denies any fevers, chills or night sweats.  He denies any loss of appetite or unintentional weight loss.  He denies any melena, hemoptysis, hematemesis, hematuria or hematochezia.  He denies any bowel or urinary complaints.  He denies any chest pain.  He does report dyspnea with exertion.  He reports occasional headaches.    PAST MEDICAL HISTORY:   1.  Hypertension  2.  Dyslipidemia  3.  Bell's palsy  4.  Osteoarthritis/degenerative disc disease  5.  BPH  6.  Hearing impaired  7.  Vitamin D deficiency  8.  Depression    SURGICAL HISTORY:   1.  Hemorrhoidectomy  2.  Right neck mass excision which was benign    FAMILY HISTORY: He denies any immediate family members with cancer or bleeding/clotting disorders.    SOCIAL HISTORY: He reports of 40-pack-year smoking history and quit in August 2017.  He reports taking alcohol socially.  He has never used any recreational drugs.  He is medically disabled.  He is .    ALLERGIES: [NKDA]    MEDICATIONS: [Medcard has been reviewed and/or reconciled.]    REVIEW OF SYSTEMS:   GENERAL: [No fevers, chills or sweats. No fatigue, weight loss or loss of appetite.]  HEENT: [No blurred vision, tinnitus, nasal discharge, sorethroat or dysphagia.]  HEART: [No chest pain, palpitations or shortness of breath.]    LUNGS: [Reports cough. Denies hemoptysis or breathing  problems.]  ABDOMEN: [No abdominal pain, nausea, vomiting, diarrhea, constipation or melena.]  GENITOURINARY: [No dysuria, bleeding or malodorous discharge.]  NEURO: [No headache, dizziness or vertigo.]  HEMATOLOGY: [No easy bruising, spontaneous bleeding or blood clots in the past].  MUSCULOSKELETAL: [No arthralgias, myalgias or bone pains.]  SKIN: [No rashes or skin lesions.]  PSYCHIATRY: [No depression. Reports anxiety.]    PHYSICAL EXAMINATION:   VS: Reviewed on nurse's notes.  APPEARANCE: The patient is a well-developed, well-nourished and well-groomed -American male who appears in no acute distress.    HEENT: No scleral icterus. Both external auditory canals clear. No oral ulcers, lesions. Throat clear  HEAD: No sinus tenderness.  NECK: Supple. No palpable lymphadenopathy. Thyroid non-tender, no palpable masses  CHEST: Scattered rales.  Scattered wheezes.  No rhonchi. Unlabored respirations.  CARDIOVASCULAR: Normal S1, S2. Normal rate. Regular rhythm.  ABDOMEN: Bowel sounds normal. No tenderness. No abdominal distention. No hepatomegaly. No splenomegaly.  : Deferred today.  LYMPHATIC: No palpable supraclavicular, axillary nodes  EXTREMITIES: No clubbing, cyanosis, edema  SKIN: No lesions. No petechiae. No ecchymoses. No induration or nodules  NEUROLOGIC: No focal findings. Alert & Oriented x 3. Mood appropriate to affect    LABS:   Reviewed    IMAGING:  Reviewed    IMPRESSION:  1.  Squamous cell carcinoma of the right lung    PLAN:  1.  I had a detailed discussion with the patient today with regard to the diagnosis, prognosis and treatment for his squamous cell carcinoma of the right lung.  2.  MRI of the brain done on August 25, 2017 does not show any evidence of metastatic disease .  I reviewed the results of his recent lab work with him in detail as well.  He expressed understanding.  3. Proceed with cycle 3 week 1 of carboplatin/Taxol today.  4.  The patient stated that he had a lot of pain when  he underwent simulation for XRT.  He reports that the pain has since resolved.  However he does not want to proceed with definitive radiation therapy at this time.  He understands risks/benefits including the possibility that we may not be able to cure his malignancy if we are unable to add definitive radiation therapy to his chemotherapy.  I have also offered him a referral to a different radiation oncology facility if he does not want to undergo radiation therapy at the [facility].  However the patient stated that he does not want to do this at this time.  He stated that he will let me know if he changes his mind.  The patient also reported his concerns with regard to something having possibly gone wrong with one of his chemotherapy infusions.  I have discussed with him that I have checked the treatment plan and everything seems to have been given accurately as ordered.  I did have our chemotherapy nurse supervisor look into his concerns as well.  He expressed understanding.  He is agreeable to continue his chemotherapy.  5.  Results of PET/CT scan from October 18, 2017 shows interval improvement in disease burden.  I have discussed the option of increasing his dose of Taxol to 80 mg/m² as he has decided not to get radiation at this time.  He understands that at this time the focus is essentially disease control and not cure based on his decision not to proceed with radiation therapy.  At this time the patient would like to change/increase his dose of Taxol at 80 mg/m². He understands risks/benefits.    Follow-up in 1 week with labs for cycle 3 week 2 of carboplatin/Taxol.  I will plan on obtaining PET/CT scan before cycle 4. He knows to call sooner if any new problems or questions.      Burt De Luna MD

## 2017-12-15 NOTE — PATIENT INSTRUCTIONS
[facility]  [address]  [address] [address]  [phone] phone     [phone] fax  Hours of Operation: Monday- Friday 8:00am- 5:00pm  After hours phone  [phone]  Hematology / Oncology Physicians on call      Dr. Glen Welch                        Please call with any concerns regarding your appointment today.  HOME CARE AFTER CHEMOTHERAPY   Meals   Many patients feel sick and lose their appetites during treatment. Eat small meals several times a day. Choose bland foods with little taste or smell if you have problems with nausea. Be sure to cook all food thoroughly. This kills bacteria and helps you avoid intestinal infection. Soft foods are easier to swallow and digest.   Activity   Exercise keeps you strong and keeps your heart and lungs active. Talk to your doctor about an appropriate exercise program for you.   Skin Care   To prevent a skin infection, bathe or shower once a day. Use a moisturizing soap and wash with warm water. Avoid very hot or cold water. Chemotherapy can make your skin dry . Apply moisturizing lotion to help relieve dry skin. Some drugs used in high doses can cause slight burns to appear (like sunburn). Ask for a special cream to help relieve the burn and protect your skin.   Prevent Mouth Sores   During chemotherapy, many people get mouth sores. Do the following to help prevent mouth sores or to ease discomfort.   Brush your teeth with a soft-bristle toothbrush after every meal.  Don't use dental floss if your platelet count is below 50,000. Your doctor or nurse will tell you if this is the case.  Use an oral swab or special soft toothbrush if your gums bleed during regular brushing.  Use mouthwash as directed. If you can't tolerate commercial mouthwash, use salt and baking soda to clean your mouth. Mix 1 teaspoon of salt and 1 teaspoon of baking soda into a glass of water. Swish and spit.  Call your doctor  or return to this facility if you develop any of the following:   Sore throat   White patches in the mouth or throat   Fever of 100.4ºF (38ºC) or higher, or as directed by your healthcare provider  © 2000-2011 [facility], [address] [place] [id number]. All rights reserved. This information is not intended as a substitute for professional medical care. Always follow your healthcare professional's   FALL PREVENTION   Falls often occur due to slipping, tripping or losing your balance. Here are ways to reduce your risk of falling again.   Was there anything that caused your fall that can be fixed, removed or replaced?   Make your home safe by keeping walkways clear of objects you may trip over.   Use non-slip pads under rugs.   Do not walk in poorly lit areas.   Do not stand on chairs or wobbly ladders.   Use caution when reaching overhead or looking upward. This position can cause a loss of balance.   Be sure your shoes fit properly, have non-slip bottoms and are in good condition.   Be cautious when going up and down stairs, curbs, and when walking on uneven sidewalks.   If your balance is poor, consider using a cane or walker.   If your fall was related to alcohol use, stop or limit alcohol intake.   If your fall was related to use of sleeping medicines, talk to your doctor about this. You may need to reduce your dosage at bedtime if you awaken during the night to go to the bathroom.   To reduce the need for nighttime bathroom trips:   Avoid drinking fluids for several hours before going to bed   Empty your bladder before going to bed   Men can keep a urinal at the bedside   © 2000-2011 [facility], [address] [place] [id number]. All rights reserved. This information is not intended as a substitute for professional medical care. Always follow your healthcare professional's instructions.

## 2017-12-22 ENCOUNTER — OFFICE VISIT (OUTPATIENT)
Dept: HEMATOLOGY/ONCOLOGY | Facility: CLINIC | Age: 67
End: 2017-12-22
Payer: MEDICARE

## 2017-12-22 VITALS
OXYGEN SATURATION: 99 % | BODY MASS INDEX: 27.21 KG/M2 | WEIGHT: 190.06 LBS | HEART RATE: 50 BPM | SYSTOLIC BLOOD PRESSURE: 124 MMHG | HEIGHT: 70 IN | DIASTOLIC BLOOD PRESSURE: 75 MMHG | TEMPERATURE: 98 F

## 2017-12-22 DIAGNOSIS — C34.91 SQUAMOUS CELL CARCINOMA OF RIGHT LUNG: Primary | ICD-10-CM

## 2017-12-22 DIAGNOSIS — C77.1 CANCER OF INTRATHORACIC LYMPH NODES, SECONDARY: ICD-10-CM

## 2017-12-22 PROCEDURE — 99999 PR PBB SHADOW E&M-EST. PATIENT-LVL III: CPT | Mod: PBBFAC,,, | Performed by: INTERNAL MEDICINE

## 2017-12-22 PROCEDURE — 99499 UNLISTED E&M SERVICE: CPT | Mod: S$GLB,,, | Performed by: INTERNAL MEDICINE

## 2017-12-22 PROCEDURE — 99215 OFFICE O/P EST HI 40 MIN: CPT | Mod: S$GLB,,, | Performed by: INTERNAL MEDICINE

## 2017-12-22 NOTE — PROGRESS NOTES
Subjective:       Patient ID: Jesse Chow is a 67 y.o. male.    Chief Complaint: Results and Lung Cancer    HPI 67-year-old male locally advanced non-small cell lung carcinoma patient reports increasing fatigue and weakness patient states that he is staggering sometimes has also states that he's having increasing cough was scheduled for a PET scan soon was most received therapy today ECOG status 2    Past Medical History:   Diagnosis Date    Borderline high blood pressure     Chronic bronchitis     Chronic low back pain     DDD (degenerative disc disease), lumbar 6/13/2017    H/O: Bell's palsy     H/O: Bell's palsy     Hyperlipidemia     Hypertension     Major depression     no meds now    Sciatica     Squamous cell carcinoma of right lung     Stroke 2003 approx     Lt side of face/ ? [place] palsy then also. later +lacunar infarcts on MRi    Tobacco abuse     Tobacco dependence      Family History   Problem Relation Age of Onset    Hypertension Mother     Heart disease Mother     Stroke Mother     Heart attack Father 87    Heart disease Father     Diabetes Sister     Diabetes Brother     Diabetes Brother     Heart disease Brother     Heart disease Daughter 38     Social History     Social History    Marital status:      Spouse name: N/A    Number of children: N/A    Years of education: N/A     Occupational History    Not on file.     Social History Main Topics    Smoking status: Current Some Day Smoker     Packs/day: 1.00     Years: 42.00     Types: Cigarettes     Last attempt to quit: 8/13/2017    Smokeless tobacco: Never Used      Comment: pack last 4-5 days      Alcohol use 0.0 oz/week    Drug use: No    Sexual activity: No     Other Topics Concern    Not on file     Social History Narrative    .  Retired . He has 4 children.No longer drives.  Does not have a Living Will or advanced directive.      Past Surgical History:   Procedure Laterality Date     BRONCHOSCOPY  08/2017    HEMORRHOID SURGERY  1973 approx    NECK MASS EXCISION Right 2014 approx    cyst       Labs:  Lab Results   Component Value Date    WBC 7.18 12/22/2017    HGB 10.6 (L) 12/22/2017    HCT 32.5 (L) 12/22/2017    MCV 82 12/22/2017     12/22/2017     BMP  Lab Results   Component Value Date     (L) 12/22/2017    K 4.0 12/22/2017     12/22/2017    CO2 22 (L) 12/22/2017    BUN 7 (L) 12/22/2017    CREATININE 0.6 12/22/2017    CALCIUM 9.1 12/22/2017    ANIONGAP 12 12/22/2017    ESTGFRAFRICA >60 12/22/2017    EGFRNONAA >60 12/22/2017     Lab Results   Component Value Date    ALT 11 12/22/2017    AST 12 12/22/2017    ALKPHOS 73 12/22/2017    BILITOT 0.5 12/22/2017       No results found for: IRON, TIBC, FERRITIN, SATURATEDIRO  Lab Results   Component Value Date    VFWPVPBA48 321 06/13/2017     No results found for: FOLATE  Lab Results   Component Value Date    TSH 1.347 05/30/2017         Review of Systems   Constitutional: Positive for activity change and fatigue. Negative for appetite change, chills, diaphoresis, fever and unexpected weight change.   HENT: Negative for congestion, dental problem, drooling, ear discharge, ear pain, facial swelling, hearing loss, mouth sores, nosebleeds, postnasal drip, rhinorrhea, sinus pressure, sneezing, sore throat, tinnitus, trouble swallowing and voice change.    Eyes: Negative for photophobia, pain, discharge, redness, itching and visual disturbance.   Respiratory: Positive for cough. Negative for apnea, choking, chest tightness, shortness of breath, wheezing and stridor.    Cardiovascular: Negative for chest pain, palpitations and leg swelling.   Gastrointestinal: Negative for abdominal distention, abdominal pain, anal bleeding, blood in stool, constipation, diarrhea, nausea, rectal pain and vomiting.   Endocrine: Negative for cold intolerance, heat intolerance, polydipsia, polyphagia and polyuria.   Genitourinary: Negative for decreased  urine volume, difficulty urinating, discharge, dysuria, enuresis, flank pain, frequency, genital sores, hematuria, penile pain, penile swelling, scrotal swelling, testicular pain and urgency.   Musculoskeletal: Negative for arthralgias, back pain, gait problem, joint swelling, myalgias, neck pain and neck stiffness.   Skin: Negative for color change, pallor, rash and wound.   Allergic/Immunologic: Negative for environmental allergies, food allergies and immunocompromised state.   Neurological: Positive for weakness. Negative for dizziness, tremors, seizures, syncope, facial asymmetry, speech difficulty, light-headedness, numbness and headaches.   Hematological: Negative for adenopathy. Does not bruise/bleed easily.   Psychiatric/Behavioral: Positive for dysphoric mood. Negative for agitation, behavioral problems, confusion, decreased concentration, hallucinations, self-injury, sleep disturbance and suicidal ideas. The patient is nervous/anxious. The patient is not hyperactive.        Objective:      Physical Exam   Constitutional: He is oriented to person, place, and time. He has a sickly appearance. He appears ill. He appears distressed.   HENT:   Head: Normocephalic.   Right Ear: External ear normal.   Left Ear: External ear normal.   Nose: Nose normal. Right sinus exhibits no maxillary sinus tenderness and no frontal sinus tenderness. Left sinus exhibits no maxillary sinus tenderness and no frontal sinus tenderness.   Mouth/Throat: Oropharynx is clear and moist. No oropharyngeal exudate.   Eyes: EOM and lids are normal. Pupils are equal, round, and reactive to light. Right eye exhibits no discharge. Left eye exhibits no discharge. Right conjunctiva is not injected. Right conjunctiva has no hemorrhage. Left conjunctiva is not injected. Left conjunctiva has no hemorrhage. No scleral icterus. Right eye exhibits normal extraocular motion. Left eye exhibits normal extraocular motion.   Neck: Normal range of motion.  Neck supple. No JVD present. No tracheal deviation present. No thyromegaly present.   Cardiovascular: Normal rate, regular rhythm and normal heart sounds.    Pulmonary/Chest: Effort normal and breath sounds normal. No stridor. No respiratory distress.   Abdominal: Soft. Bowel sounds are normal. He exhibits no mass. There is no hepatosplenomegaly, splenomegaly or hepatomegaly. There is no tenderness.   Musculoskeletal: Normal range of motion. He exhibits no edema or tenderness.   Lymphadenopathy:        Head (right side): No posterior auricular and no occipital adenopathy present.        Head (left side): No posterior auricular and no occipital adenopathy present.     He has no cervical adenopathy.        Right cervical: No superficial cervical, no deep cervical and no posterior cervical adenopathy present.       Left cervical: No superficial cervical, no deep cervical and no posterior cervical adenopathy present.     He has no axillary adenopathy.        Right: No supraclavicular adenopathy present.        Left: No supraclavicular adenopathy present.   Neurological: He is alert and oriented to person, place, and time. He has normal strength. No cranial nerve deficit. Coordination normal.   Skin: Skin is dry. No rash noted. He is not diaphoretic. No cyanosis or erythema. Nails show no clubbing.   Psychiatric: His behavior is normal. Judgment and thought content normal. His mood appears anxious. Cognition and memory are normal. He exhibits a depressed mood.   Vitals reviewed.          Assessment:      1. Squamous cell carcinoma of right lung    2. Cancer of intrathoracic lymph nodes, secondary           Plan:   Locally advanced non-small cell lung carcinoma declining performance status at this point hold chemotherapy today laboratory studies unremarkable patient is scheduled to have repeat PET scan an MRI of brain to make sure no results of progressive disease before proceeding with any for more additional treatment  discussed applications with he and his wife reviewed condition

## 2017-12-26 ENCOUNTER — TELEPHONE (OUTPATIENT)
Dept: RADIOLOGY | Facility: HOSPITAL | Age: 67
End: 2017-12-26

## 2017-12-27 ENCOUNTER — HOSPITAL ENCOUNTER (OUTPATIENT)
Dept: RADIOLOGY | Facility: HOSPITAL | Age: 67
Discharge: HOME | End: 2017-12-27
Attending: INTERNAL MEDICINE
Payer: MEDICARE

## 2017-12-27 ENCOUNTER — TELEPHONE (OUTPATIENT)
Dept: HEMATOLOGY/ONCOLOGY | Facility: CLINIC | Age: 67
End: 2017-12-27

## 2017-12-27 DIAGNOSIS — C77.1 CANCER OF INTRATHORACIC LYMPH NODES, SECONDARY: ICD-10-CM

## 2017-12-27 DIAGNOSIS — C34.91 SQUAMOUS CELL CARCINOMA OF RIGHT LUNG: ICD-10-CM

## 2017-12-27 PROCEDURE — 25500020 PHARM REV CODE 255: Performed by: INTERNAL MEDICINE

## 2017-12-27 PROCEDURE — 78815 PET IMAGE W/CT SKULL-THIGH: CPT | Mod: 26,PS,, | Performed by: RADIOLOGY

## 2017-12-27 PROCEDURE — A9552 F18 FDG: HCPCS

## 2017-12-27 PROCEDURE — A9585 GADOBUTROL INJECTION: HCPCS | Performed by: INTERNAL MEDICINE

## 2017-12-27 PROCEDURE — 70553 MRI BRAIN STEM W/O & W/DYE: CPT | Mod: TC

## 2017-12-27 RX ORDER — GADOBUTROL 604.72 MG/ML
8 INJECTION INTRAVENOUS
Status: COMPLETED | OUTPATIENT
Start: 2017-12-27 | End: 2017-12-27

## 2017-12-27 RX ADMIN — GADOBUTROL 8 ML: 604.72 INJECTION INTRAVENOUS at 12:12

## 2017-12-27 NOTE — TELEPHONE ENCOUNTER
----- Message from Jagjit Carroll MD sent at 12/27/2017  1:06 PM CST -----  Let them know no cancer and brain

## 2017-12-28 ENCOUNTER — OFFICE VISIT (OUTPATIENT)
Dept: HEMATOLOGY/ONCOLOGY | Facility: CLINIC | Age: 67
End: 2017-12-28
Payer: MEDICARE

## 2017-12-28 ENCOUNTER — TELEPHONE (OUTPATIENT)
Dept: HEMATOLOGY/ONCOLOGY | Facility: CLINIC | Age: 67
End: 2017-12-28

## 2017-12-28 VITALS
WEIGHT: 194.56 LBS | BODY MASS INDEX: 27.85 KG/M2 | DIASTOLIC BLOOD PRESSURE: 79 MMHG | HEIGHT: 70 IN | TEMPERATURE: 98 F | HEART RATE: 60 BPM | OXYGEN SATURATION: 96 % | SYSTOLIC BLOOD PRESSURE: 151 MMHG

## 2017-12-28 DIAGNOSIS — C34.91 SQUAMOUS CELL CARCINOMA OF RIGHT LUNG: Primary | Chronic | ICD-10-CM

## 2017-12-28 DIAGNOSIS — C77.0 CANCER OF HEAD, FACE, OR NECK LYMPH NODES, SECONDARY: ICD-10-CM

## 2017-12-28 DIAGNOSIS — J98.8 RESPIRATORY INFECTION: ICD-10-CM

## 2017-12-28 DIAGNOSIS — C77.1 CANCER OF INTRATHORACIC LYMPH NODES, SECONDARY: ICD-10-CM

## 2017-12-28 LAB
FLUAV AG SPEC QL IA: NEGATIVE
FLUBV AG SPEC QL IA: NEGATIVE
SPECIMEN SOURCE: NORMAL

## 2017-12-28 PROCEDURE — 99499 UNLISTED E&M SERVICE: CPT | Mod: S$GLB,,, | Performed by: INTERNAL MEDICINE

## 2017-12-28 PROCEDURE — 99999 PR PBB SHADOW E&M-EST. PATIENT-LVL III: CPT | Mod: PBBFAC,,, | Performed by: INTERNAL MEDICINE

## 2017-12-28 PROCEDURE — 99215 OFFICE O/P EST HI 40 MIN: CPT | Mod: S$GLB,,, | Performed by: INTERNAL MEDICINE

## 2017-12-28 PROCEDURE — 87400 INFLUENZA A/B EACH AG IA: CPT

## 2017-12-28 RX ORDER — LEVOFLOXACIN 750 MG/1
750 TABLET ORAL DAILY
Qty: 7 TABLET | Refills: 0 | Status: SHIPPED | OUTPATIENT
Start: 2017-12-28 | End: 2018-01-04

## 2017-12-28 NOTE — TELEPHONE ENCOUNTER
----- Message from Burt De Luna MD sent at 12/28/2017  1:35 PM CST -----  Please let the patient know that flu test is negative. Remind him to fill and take prescribed levaquin. Thank you.

## 2017-12-29 NOTE — PROGRESS NOTES
Reason for visit: Squamous cell carcinoma of the right lung  Date of Diagnosis: August 22, 2017    HPI:   The patient is a 67-year-old -American male who presents to the hematology oncology clinic today to discuss further evaluation and management recommendations for squamous cell carcinoma of the right lung.  The patient has been referred to me by Dr. Zackery Peña with pulmonary medicine.  I have reviewed all of the patient's clinical history available in the medical record and have utilized this in my evaluation and management recommendations today.  Today the patient reports that for the past few days he has been having symptoms consistent with respiratory tract infection including cough.  He states that he has been managing his symptoms at home with using over-the-counter preparations.  However they are not improving.  He denies any fevers, chills or night sweats.  He denies any loss of appetite or unintentional weight loss.  He denies any melena, hemoptysis, hematemesis, hematuria or hematochezia.  He denies any bowel or urinary complaints.  He denies any chest pain.  He does report dyspnea with exertion.  He reports occasional headaches.    PAST MEDICAL HISTORY:   1.  Hypertension  2.  Dyslipidemia  3.  Bell's palsy  4.  Osteoarthritis/degenerative disc disease  5.  BPH  6.  Hearing impaired  7.  Vitamin D deficiency  8.  Depression    SURGICAL HISTORY:   1.  Hemorrhoidectomy  2.  Right neck mass excision which was benign    FAMILY HISTORY: He denies any immediate family members with cancer or bleeding/clotting disorders.    SOCIAL HISTORY: He reports of 40-pack-year smoking history and quit in August 2017.  He reports taking alcohol socially.  He has never used any recreational drugs.  He is medically disabled.  He is .    ALLERGIES: [NKDA]    MEDICATIONS: [Medcard has been reviewed and/or reconciled.]    REVIEW OF SYSTEMS:   GENERAL: [No fevers, chills or sweats. Reports fatigue. Denies  weight loss or loss of appetite.]  HEENT: [No blurred vision, tinnitus, nasal discharge, sorethroat or dysphagia.]  HEART: [No chest pain, palpitations. Reports shortness of breath.]    LUNGS: [Reports cough. Denies hemoptysis.]  Reports shortness of breath with exertion.  ABDOMEN: [No abdominal pain, nausea, vomiting, diarrhea, constipation or melena.]  GENITOURINARY: [No dysuria, bleeding or malodorous discharge.]  NEURO: [No headache, dizziness or vertigo.]  HEMATOLOGY: [No easy bruising, spontaneous bleeding or blood clots in the past].  MUSCULOSKELETAL: [No arthralgias, myalgias or bone pains.]  SKIN: [No rashes or skin lesions.]  PSYCHIATRY: [No depression. Reports anxiety.]    PHYSICAL EXAMINATION:   VS: Reviewed on nurse's notes.  APPEARANCE: The patient is a well-developed, well-nourished and well-groomed -American male who appears in no acute distress.  He was accompanied to this clinic visit by his wife.  HEENT: No scleral icterus. Both external auditory canals clear. No oral ulcers, lesions. Throat clear  HEAD: No sinus tenderness.  NECK: Supple. No palpable lymphadenopathy. Thyroid non-tender, no palpable masses  CHEST: Scattered rales.  Scattered wheezes.  No rhonchi. Unlabored respirations.  CARDIOVASCULAR: Normal S1, S2. Normal rate. Regular rhythm.  ABDOMEN: Bowel sounds normal. No tenderness. No abdominal distention. No hepatomegaly. No splenomegaly.  : Deferred today.  LYMPHATIC: No palpable supraclavicular, axillary nodes  EXTREMITIES: No clubbing, cyanosis, edema  SKIN: No lesions. No petechiae. No ecchymoses. No induration or nodules  NEUROLOGIC: No focal findings. Alert & Oriented x 3. Mood appropriate to affect    LABS:   Reviewed    IMAGING:  Reviewed    IMPRESSION:  1.  Squamous cell carcinoma of the right lung  2.  Respiratory tract infection    PLAN:  1.  I had a detailed discussion with the patient today with regard to the diagnosis, prognosis and treatment for his squamous cell  carcinoma of the right lung.  2.  MRI of the brain done on August 25, 2017 does not show any evidence of metastatic disease .  I reviewed the results of his recent lab work with him in detail as well.  He expressed understanding.  3.  He is status post cycle 3 carboplatin/Taxol.  4.  The patient stated that he had a lot of pain when he underwent simulation for XRT.  He reports that the pain has since resolved.  However he does not want to proceed with definitive radiation therapy at this time.  He understands risks/benefits including the possibility that we may not be able to cure his malignancy if we are unable to add definitive radiation therapy to his chemotherapy.  I have also offered him a referral to a different radiation oncology facility if he does not want to undergo radiation therapy at the [facility].  However the patient stated that he does not want to do this at this time.  He stated that he will let me know if he changes his mind.  The patient also reported his concerns with regard to something having possibly gone wrong with one of his chemotherapy infusions.  I have discussed with him that I have checked the treatment plan and everything seems to have been given accurately as ordered.  I did have our chemotherapy nurse supervisor look into his concerns as well.  He expressed understanding.  He is agreeable to continue his chemotherapy.  5.  Results of PET/CT scan from October 18, 2017 showed interval improvement in disease burden.  I had discussed the option of increasing his dose of Taxol to 80 mg/m² as he has decided not to get radiation at this time.  He understands that at this time the focus is essentially disease control and not cure based on his decision not to proceed with radiation therapy.  We had increase his dose of Taxol to 80 mg/m² for cycle 3. He understands risks/benefits.  6.  We had a detailed discussion with regard to the results of the imaging studies from December 27,  2017.  This shows evidence of progressive malignancy.  I had a detailed discussion with him about our options for second line treatment which include opdivo versus other single agent chemotherapeutic drugs such as docetaxel or gemcitabine among others.  We also discussed about the option of palliative care/comfort care with hospice.  Risks/benefits and common side effects of the chemotherapy agents were discussed in detail and the patient stated that he will think about his options and will call and let me know as to how he would like to proceed.  7.  Prescription for Levaquin given for treatment of respiratory tract infection.  Supportive care medications available over-the-counter were discussed in detail as well and he expressed understanding.  I have strongly recommended that he proceed to the emergency room/seek immediate medical attention if he has any worsening of his symptoms.    Follow-up will be determined after the patient contact me as above.  He knows to call sooner if any new problems or questions.      Burt De Luna MD

## 2018-01-05 RX ORDER — ALBUTEROL SULFATE 90 UG/1
AEROSOL, METERED RESPIRATORY (INHALATION)
Qty: 16 G | Refills: 3 | Status: SHIPPED | OUTPATIENT
Start: 2018-01-05

## 2018-01-05 NOTE — TELEPHONE ENCOUNTER
----- Message from Sierra Leong sent at 1/5/2018 11:35 AM CST -----  Contact: wife [phone]  Would like to consult with nurse regarding ordering inhaler. Please shaka back at [phone].  Md Lorie

## 2018-01-11 ENCOUNTER — OFFICE VISIT (OUTPATIENT)
Dept: FAMILY MEDICINE | Facility: CLINIC | Age: 68
End: 2018-01-11
Payer: MEDICARE

## 2018-01-11 VITALS
HEIGHT: 70 IN | OXYGEN SATURATION: 98 % | DIASTOLIC BLOOD PRESSURE: 76 MMHG | SYSTOLIC BLOOD PRESSURE: 130 MMHG | WEIGHT: 186.94 LBS | RESPIRATION RATE: 18 BRPM | HEART RATE: 97 BPM | TEMPERATURE: 99 F | BODY MASS INDEX: 26.76 KG/M2

## 2018-01-11 DIAGNOSIS — C34.91 SQUAMOUS CELL CARCINOMA OF RIGHT LUNG: Primary | Chronic | ICD-10-CM

## 2018-01-11 DIAGNOSIS — I10 ESSENTIAL HYPERTENSION: Chronic | ICD-10-CM

## 2018-01-11 DIAGNOSIS — N40.0 BENIGN PROSTATIC HYPERPLASIA WITHOUT LOWER URINARY TRACT SYMPTOMS: Chronic | ICD-10-CM

## 2018-01-11 DIAGNOSIS — N40.0 BENIGN NON-NODULAR PROSTATIC HYPERPLASIA WITHOUT LOWER URINARY TRACT SYMPTOMS: ICD-10-CM

## 2018-01-11 DIAGNOSIS — C77.1 CANCER OF INTRATHORACIC LYMPH NODES, SECONDARY: ICD-10-CM

## 2018-01-11 PROCEDURE — 99214 OFFICE O/P EST MOD 30 MIN: CPT | Mod: S$GLB,,, | Performed by: INTERNAL MEDICINE

## 2018-01-11 PROCEDURE — 99499 UNLISTED E&M SERVICE: CPT | Mod: S$GLB,,, | Performed by: INTERNAL MEDICINE

## 2018-01-11 PROCEDURE — 99999 PR PBB SHADOW E&M-EST. PATIENT-LVL III: CPT | Mod: PBBFAC,,, | Performed by: INTERNAL MEDICINE

## 2018-01-11 RX ORDER — AMLODIPINE AND BENAZEPRIL HYDROCHLORIDE 5; 10 MG/1; MG/1
1 CAPSULE ORAL DAILY
Qty: 30 CAPSULE | Refills: 12 | Status: SHIPPED | OUTPATIENT
Start: 2018-01-11

## 2018-01-11 RX ORDER — TAMSULOSIN HYDROCHLORIDE 0.4 MG/1
1 CAPSULE ORAL DAILY
Qty: 30 CAPSULE | Refills: 12 | Status: SHIPPED | OUTPATIENT
Start: 2018-01-11

## 2018-01-11 NOTE — PROGRESS NOTES
Subjective:       Patient ID: Jesse Chow is a 67 y.o. male.    Chief Complaint: Medication Refill; Hypertension; Hyperlipidemia; Benign Prostatic Hypertrophy; and Lung Cancer    Medication Refill   Associated symptoms include coughing. Pertinent negatives include no abdominal pain, arthralgias, chest pain, chills, diaphoresis, fever, headaches, joint swelling, myalgias, nausea, neck pain, numbness, rash, sore throat or vomiting.   Hypertension   This is a chronic problem. The problem is controlled. Associated symptoms include shortness of breath. Pertinent negatives include no chest pain, headaches, neck pain or palpitations.   Hyperlipidemia   Associated symptoms include shortness of breath. Pertinent negatives include no chest pain or myalgias.   Benign Prostatic Hypertrophy   Irritative symptoms do not include frequency or urgency. Pertinent negatives include no chills, dysuria, hematuria, nausea or vomiting. Past treatments include tamsulosin.   Lung Cancer   Associated symptoms include coughing. Pertinent negatives include no abdominal pain, arthralgias, chest pain, chills, diaphoresis, fever, headaches, joint swelling, myalgias, nausea, neck pain, numbness, rash, sore throat or vomiting.     Review of Systems   Constitutional: Positive for activity change. Negative for appetite change, chills, diaphoresis and fever.   HENT: Negative for drooling, ear discharge, ear pain, facial swelling, hearing loss, mouth sores, nosebleeds, postnasal drip, rhinorrhea, sinus pressure, sneezing, sore throat, tinnitus and voice change.    Respiratory: Positive for cough, choking and shortness of breath. Negative for apnea, chest tightness and wheezing.    Cardiovascular: Negative for chest pain and palpitations.   Gastrointestinal: Negative for abdominal distention, abdominal pain, anal bleeding, blood in stool, constipation, diarrhea, nausea and vomiting.   Genitourinary: Negative for difficulty urinating, dysuria,  enuresis, flank pain, frequency, genital sores, hematuria and urgency.   Musculoskeletal: Negative for arthralgias, back pain, gait problem, joint swelling, myalgias and neck pain.   Skin: Negative for color change, pallor and rash.   Allergic/Immunologic: Negative for food allergies and immunocompromised state.   Neurological: Negative for tremors, seizures, syncope, facial asymmetry, speech difficulty, light-headedness, numbness and headaches.   Hematological: Negative for adenopathy. Does not bruise/bleed easily.   Psychiatric/Behavioral: Negative for agitation, behavioral problems, confusion, decreased concentration, dysphoric mood, hallucinations, self-injury and suicidal ideas. The patient is not nervous/anxious and is not hyperactive.        Objective:      Physical Exam   Constitutional: He is oriented to person, place, and time. He appears well-developed and well-nourished. No distress.   HENT:   Head: Normocephalic and atraumatic.   Eyes: Pupils are equal, round, and reactive to light.   Neck: Normal range of motion. Neck supple. No JVD present. Carotid bruit is not present. No tracheal deviation present. No thyromegaly present.   Cardiovascular: Normal rate, regular rhythm, normal heart sounds and intact distal pulses.    Pulmonary/Chest: Effort normal.   Coarse breath sounds   Abdominal: Soft. Bowel sounds are normal.   Musculoskeletal: Normal range of motion. He exhibits no edema or tenderness.   Lymphadenopathy:     He has no cervical adenopathy.   Neurological: He is alert and oriented to person, place, and time.   Skin: Skin is warm and dry. No rash noted. He is not diaphoretic. No erythema. No pallor.   Psychiatric: He has a normal mood and affect. His behavior is normal. Judgment and thought content normal.   Nursing note and vitals reviewed.      Assessment:       1. Squamous cell carcinoma of right lung    2. Essential hypertension    3. Cancer of intrathoracic lymph nodes, secondary    4. Benign  prostatic hyperplasia without lower urinary tract symptoms    5. Benign non-nodular prostatic hyperplasia without lower urinary tract symptoms        Plan:        continue meds.                Notes/labs reviewed.              mucinex bid.                           Pt. States he does not want further chemo 2nd side effects of meds, and does not want radiation treatment 2nd side effects----------understands that his cancer is progressing-------indicates he wants comfort care------has f/u oncology tomorrow.

## 2018-01-12 ENCOUNTER — OFFICE VISIT (OUTPATIENT)
Dept: HEMATOLOGY/ONCOLOGY | Facility: CLINIC | Age: 68
End: 2018-01-12
Payer: MEDICARE

## 2018-01-12 VITALS
TEMPERATURE: 99 F | SYSTOLIC BLOOD PRESSURE: 120 MMHG | HEART RATE: 93 BPM | WEIGHT: 191.13 LBS | RESPIRATION RATE: 18 BRPM | OXYGEN SATURATION: 98 % | BODY MASS INDEX: 27.36 KG/M2 | DIASTOLIC BLOOD PRESSURE: 80 MMHG | HEIGHT: 70 IN

## 2018-01-12 DIAGNOSIS — R93.89 ABNORMAL FINDINGS ON DIAGNOSTIC IMAGING OF OTHER SPECIFIED BODY STRUCTURES: ICD-10-CM

## 2018-01-12 DIAGNOSIS — C77.0 CANCER OF HEAD, FACE, OR NECK LYMPH NODES, SECONDARY: ICD-10-CM

## 2018-01-12 DIAGNOSIS — C34.91 SQUAMOUS CELL CARCINOMA OF RIGHT LUNG: Primary | Chronic | ICD-10-CM

## 2018-01-12 DIAGNOSIS — C77.1 CANCER OF INTRATHORACIC LYMPH NODES, SECONDARY: ICD-10-CM

## 2018-01-12 PROCEDURE — 99999 PR PBB SHADOW E&M-EST. PATIENT-LVL III: CPT | Mod: PBBFAC,,, | Performed by: INTERNAL MEDICINE

## 2018-01-12 PROCEDURE — 99499 UNLISTED E&M SERVICE: CPT | Mod: S$GLB,,, | Performed by: INTERNAL MEDICINE

## 2018-01-12 PROCEDURE — 99214 OFFICE O/P EST MOD 30 MIN: CPT | Mod: S$GLB,,, | Performed by: INTERNAL MEDICINE

## 2018-01-12 NOTE — PROGRESS NOTES
Reason for visit: Squamous cell carcinoma of the right lung  Date of Diagnosis: August 22, 2017    HPI:   The patient is a 67-year-old -American male who presents to the hematology oncology clinic today to discuss further evaluation and management recommendations for squamous cell carcinoma of the right lung.  The patient has been referred to me by Dr. Zackery Peña with pulmonary medicine.  I have reviewed all of the patient's clinical history available in the medical record and have utilized this in my evaluation and management recommendations today.  He denies any fevers, chills or night sweats.  He denies any loss of appetite or unintentional weight loss.  He denies any melena, hemoptysis, hematemesis, hematuria or hematochezia.  He denies any bowel or urinary complaints.  He denies any chest pain.  He does report dyspnea with exertion.  He reports occasional headaches.    PAST MEDICAL HISTORY:   1.  Hypertension  2.  Dyslipidemia  3.  Bell's palsy  4.  Osteoarthritis/degenerative disc disease  5.  BPH  6.  Hearing impaired  7.  Vitamin D deficiency  8.  Depression    SURGICAL HISTORY:   1.  Hemorrhoidectomy  2.  Right neck mass excision which was benign    FAMILY HISTORY: He denies any immediate family members with cancer or bleeding/clotting disorders.    SOCIAL HISTORY: He reports of 40-pack-year smoking history and quit in August 2017.  He reports taking alcohol socially.  He has never used any recreational drugs.  He is medically disabled.  He is .    ALLERGIES: [NKDA]    MEDICATIONS: [Medcard has been reviewed and/or reconciled.]    REVIEW OF SYSTEMS:   GENERAL: [No fevers, chills or sweats. Reports fatigue. Denies weight loss or loss of appetite.]  HEENT: [No blurred vision, tinnitus, nasal discharge, sorethroat or dysphagia.]  HEART: [No chest pain, palpitations. Reports shortness of breath.]    LUNGS: [Reports cough. Denies hemoptysis.]  Reports shortness of breath with  exertion.  ABDOMEN: [No abdominal pain, nausea, vomiting, diarrhea, constipation or melena.]  GENITOURINARY: [No dysuria, bleeding or malodorous discharge.]  NEURO: [No headache, dizziness or vertigo.]  HEMATOLOGY: [No easy bruising, spontaneous bleeding or blood clots in the past].  MUSCULOSKELETAL: [No arthralgias, myalgias or bone pains.]  SKIN: [No rashes or skin lesions.]  PSYCHIATRY: [No depression. Reports anxiety.]    PHYSICAL EXAMINATION:   VS: Reviewed on nurse's notes.  APPEARANCE: The patient is a well-developed, well-nourished and well-groomed -American male who appears in no acute distress.  He was accompanied to this clinic visit by his wife.  HEENT: No scleral icterus. Both external auditory canals clear. No oral ulcers, lesions. Throat clear  HEAD: No sinus tenderness.  NECK: Supple. No palpable lymphadenopathy. Thyroid non-tender, no palpable masses  CHEST: Scattered rales.  Scattered wheezes.  No rhonchi. Unlabored respirations.  CARDIOVASCULAR: Normal S1, S2. Normal rate. Regular rhythm.  ABDOMEN: Bowel sounds normal. No tenderness. No abdominal distention. No hepatomegaly. No splenomegaly.  : Deferred today.  LYMPHATIC: No palpable supraclavicular, axillary nodes  EXTREMITIES: No clubbing, cyanosis, edema  SKIN: No lesions. No petechiae. No ecchymoses. No induration or nodules  NEUROLOGIC: No focal findings. Alert & Oriented x 3. Mood appropriate to affect    LABS:   Reviewed    IMAGING:  Reviewed    IMPRESSION:  1.  Squamous cell carcinoma of the right lung    PLAN:  1.  I had a detailed discussion with the patient today with regard to the diagnosis, prognosis and treatment for his squamous cell carcinoma of the right lung.  2.  MRI of the brain done on August 25, 2017 does not show any evidence of metastatic disease .  I reviewed the results of his recent lab work with him in detail as well.  He expressed understanding.  3.  He is status post carboplatin/Taxol and has been noted to  have progressive malignancy.  4.  The patient stated that he had a lot of pain when he underwent simulation for XRT.  He reports that the pain has since resolved.  However he does not want to proceed with definitive radiation therapy at this time.  He understands risks/benefits including the possibility that we may not be able to cure his malignancy if we are unable to add definitive radiation therapy to his chemotherapy.  I have also offered him a referral to a different radiation oncology facility if he does not want to undergo radiation therapy at the [facility].  However the patient stated that he does not want to do this at this time.  He stated that he will let me know if he changes his mind.  The patient also reported his concerns with regard to something having possibly gone wrong with one of his chemotherapy infusions.  I have discussed with him that I have checked the treatment plan and everything seems to have been given accurately as ordered.  I did have our chemotherapy nurse supervisor look into his concerns as well.  He expressed understanding.    5.  We had a detailed discussion with regard to the results of the imaging studies from December 27, 2017.  This shows evidence of progressive malignancy.  I had a detailed discussion with him about our options for second line treatment which include opdivo versus other single agent chemotherapeutic drugs such as docetaxel or gemcitabine among others.  We also discussed about the option of palliative care/comfort care with hospice.  Risks/benefits and common side effects of the chemotherapy agents were discussed in detail. He wants to proceed with second line treatment with opdivo.  6. He did not take prescribed levaquin because he did not like the potential side effects.    Follow-up as above with labs for cycle 1 of opdivo. He knows to call sooner if any new problems or questions.      Burt De Luna MD

## 2018-02-15 ENCOUNTER — INFUSION (OUTPATIENT)
Dept: INFUSION THERAPY | Facility: HOSPITAL | Age: 68
End: 2018-02-15
Attending: INTERNAL MEDICINE
Payer: MEDICARE

## 2018-02-15 DIAGNOSIS — C77.0 CANCER OF HEAD, FACE, OR NECK LYMPH NODES, SECONDARY: Primary | ICD-10-CM

## 2018-02-15 PROCEDURE — 25000003 PHARM REV CODE 250: Mod: PO | Performed by: INTERNAL MEDICINE

## 2018-02-15 PROCEDURE — 63600175 PHARM REV CODE 636 W HCPCS: Mod: PO | Performed by: INTERNAL MEDICINE

## 2018-02-15 PROCEDURE — 96523 IRRIG DRUG DELIVERY DEVICE: CPT | Mod: PO

## 2018-02-15 PROCEDURE — A4216 STERILE WATER/SALINE, 10 ML: HCPCS | Mod: PO | Performed by: INTERNAL MEDICINE

## 2018-02-15 RX ORDER — SODIUM CHLORIDE 0.9 % (FLUSH) 0.9 %
10 SYRINGE (ML) INJECTION
Status: COMPLETED | OUTPATIENT
Start: 2018-02-15 | End: 2018-02-15

## 2018-02-15 RX ORDER — HEPARIN 100 UNIT/ML
500 SYRINGE INTRAVENOUS
Status: COMPLETED | OUTPATIENT
Start: 2018-02-15 | End: 2018-02-15

## 2018-02-15 RX ORDER — SODIUM CHLORIDE 0.9 % (FLUSH) 0.9 %
10 SYRINGE (ML) INJECTION
Status: CANCELLED | OUTPATIENT
Start: 2018-02-15

## 2018-02-15 RX ORDER — HEPARIN 100 UNIT/ML
500 SYRINGE INTRAVENOUS
Status: CANCELLED | OUTPATIENT
Start: 2018-02-15

## 2018-02-15 RX ADMIN — HEPARIN 500 UNITS: 100 SYRINGE at 08:02

## 2018-02-15 RX ADMIN — SODIUM CHLORIDE, PRESERVATIVE FREE 10 ML: 5 INJECTION INTRAVENOUS at 08:02

## 2018-02-20 ENCOUNTER — OFFICE VISIT (OUTPATIENT)
Dept: FAMILY MEDICINE | Facility: CLINIC | Age: 68
End: 2018-02-20
Payer: MEDICARE

## 2018-02-20 VITALS
DIASTOLIC BLOOD PRESSURE: 70 MMHG | BODY MASS INDEX: 25.6 KG/M2 | HEIGHT: 70 IN | OXYGEN SATURATION: 95 % | TEMPERATURE: 99 F | SYSTOLIC BLOOD PRESSURE: 100 MMHG | HEART RATE: 90 BPM | RESPIRATION RATE: 16 BRPM | WEIGHT: 178.81 LBS

## 2018-02-20 DIAGNOSIS — M54.2 NECK PAIN: Primary | ICD-10-CM

## 2018-02-20 PROCEDURE — 99213 OFFICE O/P EST LOW 20 MIN: CPT | Mod: S$GLB,,, | Performed by: INTERNAL MEDICINE

## 2018-02-20 PROCEDURE — 3008F BODY MASS INDEX DOCD: CPT | Mod: S$GLB,,, | Performed by: INTERNAL MEDICINE

## 2018-02-20 PROCEDURE — 1159F MED LIST DOCD IN RCRD: CPT | Mod: S$GLB,,, | Performed by: INTERNAL MEDICINE

## 2018-02-20 PROCEDURE — 99999 PR PBB SHADOW E&M-EST. PATIENT-LVL IV: CPT | Mod: PBBFAC,,, | Performed by: INTERNAL MEDICINE

## 2018-02-20 PROCEDURE — 1126F AMNT PAIN NOTED NONE PRSNT: CPT | Mod: S$GLB,,, | Performed by: INTERNAL MEDICINE

## 2018-02-20 RX ORDER — HYDROCODONE BITARTRATE AND ACETAMINOPHEN 7.5; 325 MG/1; MG/1
1 TABLET ORAL 3 TIMES DAILY PRN
Qty: 30 TABLET | Refills: 0 | Status: SHIPPED | OUTPATIENT
Start: 2018-02-20 | End: 2018-04-10

## 2018-02-20 RX ORDER — BACLOFEN 10 MG/1
10 TABLET ORAL NIGHTLY PRN
Qty: 30 TABLET | Refills: 0 | Status: SHIPPED | OUTPATIENT
Start: 2018-02-20 | End: 2018-03-19 | Stop reason: SDUPTHER

## 2018-02-20 RX ORDER — ONDANSETRON 8 MG/1
8 TABLET, ORALLY DISINTEGRATING ORAL ONCE
COMMUNITY

## 2018-02-20 NOTE — PROGRESS NOTES
Subjective:       Patient ID: Jesse Chow is a 67 y.o. male.    Chief Complaint: Neck Pain  ---------1 week---------right side neck pain------------went to sleep on love seat and awoke with pain 8/10 neck-radiating to right back----------positional-----------HPI  Review of Systems   Constitutional: Negative for chills and fever.   HENT: Negative.    Respiratory: Negative for apnea, cough, choking, chest tightness, shortness of breath, wheezing and stridor.    Cardiovascular: Negative for chest pain, palpitations and leg swelling.   Gastrointestinal: Negative for abdominal pain and nausea.   Neurological: Negative for dizziness, tremors, syncope, weakness and numbness.   Psychiatric/Behavioral: Negative for agitation, behavioral problems and confusion.       Objective:      Physical Exam   Constitutional: He is oriented to person, place, and time. He appears well-developed and well-nourished. No distress.   HENT:   Head: Normocephalic and atraumatic.   Eyes: Pupils are equal, round, and reactive to light.   Neck: Normal range of motion. Neck supple. Carotid bruit is not present.   Cardiovascular: Normal rate, regular rhythm, normal heart sounds and intact distal pulses.    Pulmonary/Chest: Effort normal and breath sounds normal. No respiratory distress. He has no wheezes. He has no rales. He exhibits no tenderness.   Abdominal: Soft. Bowel sounds are normal.   Musculoskeletal: Normal range of motion. He exhibits no edema or tenderness.   Reproducible tenderness to palpation posterior/right neck---------   Neurological: He is alert and oriented to person, place, and time.   Skin: Skin is warm and dry. No rash noted. He is not diaphoretic. No erythema. No pallor.   Psychiatric: He has a normal mood and affect. His behavior is normal. Judgment and thought content normal.   Nursing note and vitals reviewed.      Assessment:       1. Neck pain        Plan:        norco 7.5 tid prn with baclofen 10 mg bid prn-call if  persists.

## 2018-02-23 ENCOUNTER — TELEPHONE (OUTPATIENT)
Dept: FAMILY MEDICINE | Facility: CLINIC | Age: 68
End: 2018-02-23

## 2018-02-23 NOTE — TELEPHONE ENCOUNTER
----- Message from Stephanie Villatoro sent at 2/23/2018  2:42 PM CST -----  Contact: Elsie Zimmerman requesting a Rx for a laxative.    Pt use..  [facility]/pharmacy #[id number] - [place]der[place] [address] [address]  Phone: [phone] Fax: [phone]     Please adv/call [phone].[email or web address]

## 2018-02-26 ENCOUNTER — TELEPHONE (OUTPATIENT)
Dept: FAMILY MEDICINE | Facility: CLINIC | Age: 68
End: 2018-02-26

## 2018-02-26 NOTE — TELEPHONE ENCOUNTER
----- Message from Sierra Leong sent at 2/23/2018  4:25 PM CST -----  Contact: Wife [phone]  Would like to consult with nurse regarding issue with constipation.  Please call back at [phone].  Md Lorie

## 2018-03-19 ENCOUNTER — TELEPHONE (OUTPATIENT)
Dept: INFUSION THERAPY | Facility: HOSPITAL | Age: 68
End: 2018-03-19

## 2018-03-19 RX ORDER — BACLOFEN 10 MG/1
10 TABLET ORAL NIGHTLY PRN
Qty: 30 TABLET | Refills: 0 | Status: SHIPPED | OUTPATIENT
Start: 2018-03-19 | End: 2019-03-19

## 2018-03-29 ENCOUNTER — OFFICE VISIT (OUTPATIENT)
Dept: HEMATOLOGY/ONCOLOGY | Facility: CLINIC | Age: 68
End: 2018-03-29
Payer: MEDICARE

## 2018-03-29 ENCOUNTER — TELEPHONE (OUTPATIENT)
Dept: HEMATOLOGY/ONCOLOGY | Facility: CLINIC | Age: 68
End: 2018-03-29

## 2018-03-29 ENCOUNTER — INFUSION (OUTPATIENT)
Dept: INFUSION THERAPY | Facility: HOSPITAL | Age: 68
End: 2018-03-29
Attending: INTERNAL MEDICINE
Payer: MEDICARE

## 2018-03-29 VITALS
TEMPERATURE: 100 F | WEIGHT: 173.75 LBS | OXYGEN SATURATION: 96 % | HEART RATE: 106 BPM | BODY MASS INDEX: 24.93 KG/M2 | SYSTOLIC BLOOD PRESSURE: 128 MMHG | DIASTOLIC BLOOD PRESSURE: 68 MMHG

## 2018-03-29 VITALS
TEMPERATURE: 101 F | SYSTOLIC BLOOD PRESSURE: 116 MMHG | HEART RATE: 105 BPM | DIASTOLIC BLOOD PRESSURE: 70 MMHG | OXYGEN SATURATION: 98 % | RESPIRATION RATE: 22 BRPM

## 2018-03-29 DIAGNOSIS — C34.91 SQUAMOUS CELL CARCINOMA OF RIGHT LUNG: Primary | Chronic | ICD-10-CM

## 2018-03-29 DIAGNOSIS — C77.1 CANCER OF INTRATHORACIC LYMPH NODES, SECONDARY: ICD-10-CM

## 2018-03-29 DIAGNOSIS — R64 MALIGNANT CACHEXIA: ICD-10-CM

## 2018-03-29 DIAGNOSIS — C77.0 CANCER OF HEAD, FACE, OR NECK LYMPH NODES, SECONDARY: Primary | ICD-10-CM

## 2018-03-29 DIAGNOSIS — R06.02 SHORTNESS OF BREATH: ICD-10-CM

## 2018-03-29 PROCEDURE — 25000003 PHARM REV CODE 250: Mod: PO | Performed by: INTERNAL MEDICINE

## 2018-03-29 PROCEDURE — 96523 IRRIG DRUG DELIVERY DEVICE: CPT | Mod: PO

## 2018-03-29 PROCEDURE — A4216 STERILE WATER/SALINE, 10 ML: HCPCS | Mod: PO | Performed by: INTERNAL MEDICINE

## 2018-03-29 PROCEDURE — 63600175 PHARM REV CODE 636 W HCPCS: Mod: PO | Performed by: INTERNAL MEDICINE

## 2018-03-29 PROCEDURE — 3074F SYST BP LT 130 MM HG: CPT | Mod: CPTII,S$GLB,, | Performed by: INTERNAL MEDICINE

## 2018-03-29 PROCEDURE — 99499 UNLISTED E&M SERVICE: CPT | Mod: S$GLB,,, | Performed by: INTERNAL MEDICINE

## 2018-03-29 PROCEDURE — 99214 OFFICE O/P EST MOD 30 MIN: CPT | Mod: 25,S$GLB,, | Performed by: INTERNAL MEDICINE

## 2018-03-29 PROCEDURE — 99999 PR PBB SHADOW E&M-EST. PATIENT-LVL III: CPT | Mod: PBBFAC,,, | Performed by: INTERNAL MEDICINE

## 2018-03-29 PROCEDURE — 3078F DIAST BP <80 MM HG: CPT | Mod: CPTII,S$GLB,, | Performed by: INTERNAL MEDICINE

## 2018-03-29 RX ORDER — HEPARIN 100 UNIT/ML
500 SYRINGE INTRAVENOUS
Status: COMPLETED | OUTPATIENT
Start: 2018-03-29 | End: 2018-03-29

## 2018-03-29 RX ORDER — SODIUM CHLORIDE 0.9 % (FLUSH) 0.9 %
10 SYRINGE (ML) INJECTION
Status: COMPLETED | OUTPATIENT
Start: 2018-03-29 | End: 2018-03-29

## 2018-03-29 RX ORDER — HEPARIN 100 UNIT/ML
500 SYRINGE INTRAVENOUS
Status: CANCELLED | OUTPATIENT
Start: 2018-03-29

## 2018-03-29 RX ORDER — SODIUM CHLORIDE 0.9 % (FLUSH) 0.9 %
10 SYRINGE (ML) INJECTION
Status: CANCELLED | OUTPATIENT
Start: 2018-03-29

## 2018-03-29 RX ADMIN — HEPARIN 500 UNITS: 100 SYRINGE at 10:03

## 2018-03-29 RX ADMIN — SODIUM CHLORIDE, PRESERVATIVE FREE 10 ML: 5 INJECTION INTRAVENOUS at 10:03

## 2018-03-29 NOTE — PATIENT INSTRUCTIONS
[facility]  [address]  [address] [address]  [phone] phone     [phone] fax  Hours of Operation: Monday- Friday 8:00am- 5:00pm  After hours phone  [phone]  Hematology / Oncology Physicians on call      Dr. Glen Pnito    Please call with any concerns regarding your appointment today.

## 2018-03-29 NOTE — PROGRESS NOTES
Reason for visit: Squamous cell carcinoma of the right lung  Date of Diagnosis: August 22, 2017    HPI:   The patient is a 67-year-old -American male who presents to the hematology oncology clinic today to discuss further evaluation and management recommendations for squamous cell carcinoma of the right lung.  The patient has been referred to me by Dr. Zackery Peña with pulmonary medicine.  I have reviewed all of the patient's clinical history available in the medical record and have utilized this in my evaluation and management recommendations today.  Today the patient is doing very poorly.  He did not want to proceed with any further treatment for his metastatic squamous cell carcinoma of the lung previously and canceled his previously scheduled appointment for opdivo.  He has been short of breath.  He denies any pain.  He has been eating poorly.  He has lost weight in the interim.     PAST MEDICAL HISTORY:   1.  Hypertension  2.  Dyslipidemia  3.  Bell's palsy  4.  Osteoarthritis/degenerative disc disease  5.  BPH  6.  Hearing impaired  7.  Vitamin D deficiency  8.  Depression    SURGICAL HISTORY:   1.  Hemorrhoidectomy  2.  Right neck mass excision which was benign    FAMILY HISTORY: He denies any immediate family members with cancer or bleeding/clotting disorders.    SOCIAL HISTORY: He reports of 40-pack-year smoking history and quit in August 2017.  He reports taking alcohol socially.  He has never used any recreational drugs.  He is medically disabled.  He is .    ALLERGIES: [NKDA]    MEDICATIONS: [Medcard has been reviewed and/or reconciled.]    REVIEW OF SYSTEMS:   GENERAL: [No fevers, chills or sweats. Reports fatigue. Reports weight loss and loss of appetite.]  HEENT: [No blurred vision, tinnitus, nasal discharge, sorethroat or dysphagia.]  HEART: [No chest pain, palpitations. Reports shortness of breath.]    LUNGS: [Reports cough. Denies hemoptysis.]  Reports shortness of  breath.  ABDOMEN: [No abdominal pain, nausea, vomiting, diarrhea, constipation or melena.]  GENITOURINARY: [No dysuria, bleeding or malodorous discharge.]  NEURO: [No headache, dizziness or vertigo.]  HEMATOLOGY: [No easy bruising, spontaneous bleeding or blood clots in the past].  MUSCULOSKELETAL: [No arthralgias, myalgias or bone pains.]  SKIN: [No rashes or skin lesions.]  PSYCHIATRY: [No depression. Reports anxiety.]    PHYSICAL EXAMINATION:   VS: Reviewed on nurse's notes.  APPEARANCE: The patient is a chronically ill-appearing and well-groomed -American male who appears in mild to moderate distress.  He was accompanied to this clinic visit by his wife.  HEENT: No scleral icterus. Both external auditory canals clear. No oral ulcers, lesions. Throat clear  HEAD: No sinus tenderness.  NECK: Supple. No palpable lymphadenopathy. Thyroid non-tender, no palpable masses  CHEST: Scattered rales.  Scattered wheezes.  No rhonchi. Mildly labored respirations.  CARDIOVASCULAR: Normal S1, S2.  Sinus tachycardia.  Regular rhythm.  ABDOMEN: Bowel sounds normal. No tenderness. No abdominal distention. No hepatomegaly. No splenomegaly.  : Deferred today.  LYMPHATIC: No palpable supraclavicular, axillary nodes  EXTREMITIES: No clubbing, cyanosis, edema  SKIN: No lesions. No petechiae. No ecchymoses. No induration or nodules  NEUROLOGIC: No focal findings. Alert & Oriented x 3. Mood appropriate to affect    LABS:   Reviewed    IMAGING:  Reviewed    IMPRESSION:  1.  Metastatic squamous cell carcinoma of the right lung  2.  Shortness of breath  3.  Malignant cachexia    PLAN:  1.  I had a detailed discussion with the patient today with regard to the diagnosis, prognosis and treatment for his squamous cell carcinoma of the right lung.  2.  We had a extensive discussion about the next best step in his clinical care since he has decided not to take any further treatment for his incurable metastatic malignancy.  I have  recommended transitioning to best supportive care/palliative care/hospice care.  I have discussed the option of hospitalization to optimize management for his comfort and then transitioning to hospice care.  He did not want to do this.  I have discussed sending a representative from hospice to his home for an informational visit and arranging for all necessary equipment including supplemental oxygen for his comfort.  He did not want to do this.  His wife appears to be overwhelmed at this time.  I will request one of our social workers to contact the patient and try to help with this difficult situation.    Follow-up in 2 weeks. He knows to call sooner if he changes his mind with regard to transitioning to hospice care.      Burt De Luna MD

## 2018-03-29 NOTE — TELEPHONE ENCOUNTER
Called pt to f/u regarding his visit with Dr. De Luna this morning. Pt very short of breath. Spoke to both the patient and his wife on speaker phone. We discussed that if he cannot/will not get further treatment, and he does not want to go to the hospital, the next best thing to do is utilize hospice services. Explained the benefits that it can offer at this time and stressed that this does not mean he is giving up. It will help him and his family too, and he acknowledged how difficult this has been for his wife. He admitted that he was very upset earlier in the visit with Dr. De Luna but is open now to hospice. Gave him several agency options to choose from; pt and wife have no preference at this time. Notified Dr. De Luna, who will have his nurse contact Nadiya with [facility] to make arrangements. SW will call and check on pt/wife next week.

## 2018-04-03 ENCOUNTER — TELEPHONE (OUTPATIENT)
Dept: HEMATOLOGY/ONCOLOGY | Facility: CLINIC | Age: 68
End: 2018-04-03

## 2018-04-03 NOTE — TELEPHONE ENCOUNTER
----- Message from Marianne Olsen sent at 4/3/2018  8:21 AM CDT -----  Contact: self   Would like to consult with nurse regarding pain and oxygen. Please call back at [phone].      Thanks,  Marianne Olsen

## 2018-04-04 ENCOUNTER — HOSPITAL ENCOUNTER (EMERGENCY)
Facility: HOSPITAL | Age: 68
Discharge: HOME | End: 2018-04-04
Attending: EMERGENCY MEDICINE
Payer: MEDICARE

## 2018-04-04 VITALS
TEMPERATURE: 99 F | SYSTOLIC BLOOD PRESSURE: 131 MMHG | DIASTOLIC BLOOD PRESSURE: 76 MMHG | RESPIRATION RATE: 36 BRPM | HEART RATE: 85 BPM | OXYGEN SATURATION: 100 %

## 2018-04-04 DIAGNOSIS — E87.1 HYPONATREMIA: ICD-10-CM

## 2018-04-04 DIAGNOSIS — J98.09 BRONCHIAL OBSTRUCTION: ICD-10-CM

## 2018-04-04 DIAGNOSIS — C34.01 MALIGNANT NEOPLASM OF HILUS OF RIGHT LUNG: Primary | ICD-10-CM

## 2018-04-04 LAB
ALBUMIN SERPL BCP-MCNC: 3.3 G/DL
ALP SERPL-CCNC: 75 U/L
ALT SERPL W/O P-5'-P-CCNC: 17 U/L
ANION GAP SERPL CALC-SCNC: 10 MMOL/L
AST SERPL-CCNC: 13 U/L
BASOPHILS # BLD AUTO: 0.02 K/UL
BASOPHILS NFR BLD: 0.2 %
BILIRUB SERPL-MCNC: 0.6 MG/DL
BNP SERPL-MCNC: <10 PG/ML
BUN SERPL-MCNC: 8 MG/DL
CALCIUM SERPL-MCNC: 9.9 MG/DL
CHLORIDE SERPL-SCNC: 91 MMOL/L
CO2 SERPL-SCNC: 27 MMOL/L
CREAT SERPL-MCNC: 0.7 MG/DL
DIFFERENTIAL METHOD: ABNORMAL
EOSINOPHIL # BLD AUTO: 0.1 K/UL
EOSINOPHIL NFR BLD: 0.6 %
ERYTHROCYTE [DISTWIDTH] IN BLOOD BY AUTOMATED COUNT: 15 %
EST. GFR  (AFRICAN AMERICAN): >60 ML/MIN/1.73 M^2
EST. GFR  (NON AFRICAN AMERICAN): >60 ML/MIN/1.73 M^2
GLUCOSE SERPL-MCNC: 107 MG/DL
HCT VFR BLD AUTO: 33.3 %
HGB BLD-MCNC: 11.3 G/DL
LACTATE SERPL-SCNC: 1.5 MMOL/L
LYMPHOCYTES # BLD AUTO: 1.5 K/UL
LYMPHOCYTES NFR BLD: 14.7 %
MCH RBC QN AUTO: 26.7 PG
MCHC RBC AUTO-ENTMCNC: 33.9 G/DL
MCV RBC AUTO: 79 FL
MONOCYTES # BLD AUTO: 0.8 K/UL
MONOCYTES NFR BLD: 8 %
NEUTROPHILS # BLD AUTO: 7.9 K/UL
NEUTROPHILS NFR BLD: 76.5 %
PLATELET # BLD AUTO: 426 K/UL
PMV BLD AUTO: 8.5 FL
POTASSIUM SERPL-SCNC: 4.4 MMOL/L
PROT SERPL-MCNC: 7.9 G/DL
RBC # BLD AUTO: 4.23 M/UL
SODIUM SERPL-SCNC: 128 MMOL/L
TROPONIN I SERPL DL<=0.01 NG/ML-MCNC: <0.006 NG/ML
WBC # BLD AUTO: 10.37 K/UL

## 2018-04-04 PROCEDURE — 25000242 PHARM REV CODE 250 ALT 637 W/ HCPCS: Performed by: EMERGENCY MEDICINE

## 2018-04-04 PROCEDURE — 84484 ASSAY OF TROPONIN QUANT: CPT

## 2018-04-04 PROCEDURE — 93010 ELECTROCARDIOGRAM REPORT: CPT | Mod: ,,, | Performed by: INTERNAL MEDICINE

## 2018-04-04 PROCEDURE — 85025 COMPLETE CBC W/AUTO DIFF WBC: CPT

## 2018-04-04 PROCEDURE — 99285 EMERGENCY DEPT VISIT HI MDM: CPT | Mod: 25

## 2018-04-04 PROCEDURE — 83880 ASSAY OF NATRIURETIC PEPTIDE: CPT

## 2018-04-04 PROCEDURE — 25500020 PHARM REV CODE 255: Performed by: EMERGENCY MEDICINE

## 2018-04-04 PROCEDURE — 83605 ASSAY OF LACTIC ACID: CPT

## 2018-04-04 PROCEDURE — 80053 COMPREHEN METABOLIC PANEL: CPT

## 2018-04-04 PROCEDURE — 94761 N-INVAS EAR/PLS OXIMETRY MLT: CPT

## 2018-04-04 PROCEDURE — 94640 AIRWAY INHALATION TREATMENT: CPT

## 2018-04-04 PROCEDURE — 93005 ELECTROCARDIOGRAM TRACING: CPT

## 2018-04-04 RX ORDER — IPRATROPIUM BROMIDE AND ALBUTEROL SULFATE 2.5; .5 MG/3ML; MG/3ML
3 SOLUTION RESPIRATORY (INHALATION)
Status: COMPLETED | OUTPATIENT
Start: 2018-04-04 | End: 2018-04-04

## 2018-04-04 RX ADMIN — IOHEXOL 75 ML: 350 INJECTION, SOLUTION INTRAVENOUS at 01:04

## 2018-04-04 RX ADMIN — IPRATROPIUM BROMIDE AND ALBUTEROL SULFATE 3 ML: .5; 3 SOLUTION RESPIRATORY (INHALATION) at 09:04

## 2018-04-04 RX ADMIN — IOHEXOL 30 ML: 350 INJECTION, SOLUTION INTRAVENOUS at 11:04

## 2018-04-04 NOTE — ED PROVIDER NOTES
SCRIBE #1 NOTE: I, Ellen Martinezen, am scribing for, and in the presence of, Alvarado Galeas MD. I have scribed the HPI, ROS, PEx.     SCRIBE #2 NOTE: I, Eligio Wright, am scribing for, and in the presence of,  Alvarado Galeas. I have scribed the remaining portions of the note not scribed by Scribe #1.     History      Chief Complaint   Patient presents with    Shortness of Breath     x 3 weeks       Review of patient's allergies indicates:  No Known Allergies     HPI   HPI    4/4/2018 8:59 AM   History obtained from the patient      History of Present Illness: Jesse Chow is a 67 y.o. male patient who presents to the Emergency Department for SOB which onset gradually a week ago. Pt was referred to the ED by his PCP for further evaluation of his sxs. Pt states he has been having his sxs for about 3 weeks. Symptoms are constant and moderate in severity.  No mitigating or exacerbating factors reported. Associated sxs include cough. Patient denies any fever, chills, CP, wheezing, abd pain, leg swelling, HA, dizziness and all other sxs at this time. No prior Tx. No further complaints or concerns at this time.         Arrival mode: Personal vehicle      PCP: Sunday Zhu MD       Past Medical History:  Past Medical History:   Diagnosis Date    Borderline high blood pressure     Chronic bronchitis     Chronic low back pain     DDD (degenerative disc disease), lumbar 6/13/2017    H/O: Bell's palsy     H/O: Bell's palsy     Hyperlipidemia     Hypertension     Major depression     no meds now    Sciatica     Squamous cell carcinoma of right lung     Stroke 2003 approx     Lt side of face/ ? Wells palsy then also. later +lacunar infarcts on MRi    Tobacco abuse     Tobacco dependence        Past Surgical History:  Past Surgical History:   Procedure Laterality Date    BRONCHOSCOPY  08/2017    HEMORRHOID SURGERY  1973 approx    NECK MASS EXCISION Right 2014 approx    cyst         Family  History:  Family History   Problem Relation Age of Onset    Hypertension Mother     Heart disease Mother     Stroke Mother     Heart attack Father 87    Heart disease Father     Diabetes Sister     Diabetes Brother     Diabetes Brother     Heart disease Brother     Heart disease Daughter 38       Social History:  Social History     Social History Main Topics    Smoking status: Former Smoker     Packs/day: 1.00     Years: 42.00     Types: Cigarettes     Quit date: 8/13/2017    Smokeless tobacco: Never Used      Comment: pack last 4-5 days      Alcohol use Yes      Comment: occasional    Drug use: No    Sexual activity: No       ROS   Review of Systems   Constitutional: Negative for fever.   HENT: Negative for sore throat.    Respiratory: Positive for cough and shortness of breath. Negative for chest tightness and wheezing.    Cardiovascular: Negative for chest pain.   Gastrointestinal: Negative for nausea.   Genitourinary: Negative for dysuria and hematuria.   Musculoskeletal: Negative for back pain.   Skin: Negative for rash.   Neurological: Negative for weakness.   Hematological: Does not bruise/bleed easily.   All other systems reviewed and are negative.      Physical Exam      Initial Vitals [04/04/18 0854]   BP Pulse Resp Temp SpO2   115/64 97 (!) 30 98.6 °F (37 °C) (!) 94 %      MAP       81          Physical Exam  Nursing Notes and Vital Signs Reviewed.  Constitutional: Patient is in no acute distress. Well-developed and well-nourished.  Head: Atraumatic. Normocephalic.  Eyes: PERRL. EOM intact. Conjunctivae are not pale. No scleral icterus.  ENT: Mucous membranes are moist. Oropharynx is clear and symmetric.    Neck: Supple. Full ROM. No lymphadenopathy.  Cardiovascular: Tachycardic. Regular rhythm. No murmurs, rubs, or gallops. Distal pulses are 2+ and symmetric.  Pulmonary/Chest: No respiratory distress. Clear to auscultation bilaterally. No wheezing or rales. Tachypnic.   Abdominal: Soft and  non-distended.  There is no tenderness.  No rebound, guarding, or rigidity. Good bowel sounds.  Musculoskeletal: Moves all extremities. No obvious deformities. No edema. No calf tenderness. Visible port in R shoulder.  Skin: Warm and dry.  Neurological:  Alert, awake, and appropriate.  Normal speech.  No acute focal neurological deficits are appreciated.  Psychiatric: Normal affect. Good eye contact. Appropriate in content.    ED Course    Procedures  ED Vital Signs:  Vitals:    04/04/18 0854 04/04/18 0918 04/04/18 0923 04/04/18 0927   BP: 115/64      Pulse: 97 89 88 87   Resp: (!) 30 15 20 (!) 34   Temp: 98.6 °F (37 °C)      TempSrc: Oral      SpO2: (!) 94% 100% 100% 100%    04/04/18 1202 04/04/18 1503 04/04/18 1602   BP: 115/73 123/71 131/76   Pulse: 80 86 85   Resp: (!) 22 (!) 33 (!) 36   Temp:      TempSrc:      SpO2: 100% 99% 100%       Abnormal Lab Results:  Labs Reviewed   CBC W/ AUTO DIFFERENTIAL - Abnormal; Notable for the following:        Result Value    RBC 4.23 (*)     Hemoglobin 11.3 (*)     Hematocrit 33.3 (*)     MCV 79 (*)     MCH 26.7 (*)     RDW 15.0 (*)     Platelets 426 (*)     MPV 8.5 (*)     Gran # (ANC) 7.9 (*)     Gran% 76.5 (*)     Lymph% 14.7 (*)     All other components within normal limits   COMPREHENSIVE METABOLIC PANEL - Abnormal; Notable for the following:     Sodium 128 (*)     Chloride 91 (*)     Albumin 3.3 (*)     All other components within normal limits   B-TYPE NATRIURETIC PEPTIDE   TROPONIN I   LACTIC ACID, PLASMA        All Lab Results:  Results for orders placed or performed during the hospital encounter of 04/04/18   Brain natriuretic peptide   Result Value Ref Range    BNP <10 0 - 99 pg/mL   CBC auto differential   Result Value Ref Range    WBC 10.37 3.90 - 12.70 K/uL    RBC 4.23 (L) 4.60 - 6.20 M/uL    Hemoglobin 11.3 (L) 14.0 - 18.0 g/dL    Hematocrit 33.3 (L) 40.0 - 54.0 %    MCV 79 (L) 82 - 98 fL    MCH 26.7 (L) 27.0 - 31.0 pg    MCHC 33.9 32.0 - 36.0 g/dL    RDW 15.0  (H) 11.5 - 14.5 %    Platelets 426 (H) 150 - 350 K/uL    MPV 8.5 (L) 9.2 - 12.9 fL    Gran # (ANC) 7.9 (H) 1.8 - 7.7 K/uL    Lymph # 1.5 1.0 - 4.8 K/uL    Mono # 0.8 0.3 - 1.0 K/uL    Eos # 0.1 0.0 - 0.5 K/uL    Baso # 0.02 0.00 - 0.20 K/uL    Gran% 76.5 (H) 38.0 - 73.0 %    Lymph% 14.7 (L) 18.0 - 48.0 %    Mono% 8.0 4.0 - 15.0 %    Eosinophil% 0.6 0.0 - 8.0 %    Basophil% 0.2 0.0 - 1.9 %    Differential Method Automated    Comprehensive metabolic panel   Result Value Ref Range    Sodium 128 (L) 136 - 145 mmol/L    Potassium 4.4 3.5 - 5.1 mmol/L    Chloride 91 (L) 95 - 110 mmol/L    CO2 27 23 - 29 mmol/L    Glucose 107 70 - 110 mg/dL    BUN, Bld 8 8 - 23 mg/dL    Creatinine 0.7 0.5 - 1.4 mg/dL    Calcium 9.9 8.7 - 10.5 mg/dL    Total Protein 7.9 6.0 - 8.4 g/dL    Albumin 3.3 (L) 3.5 - 5.2 g/dL    Total Bilirubin 0.6 0.1 - 1.0 mg/dL    Alkaline Phosphatase 75 55 - 135 U/L    AST 13 10 - 40 U/L    ALT 17 10 - 44 U/L    Anion Gap 10 8 - 16 mmol/L    eGFR if African American >60 >60 mL/min/1.73 m^2    eGFR if non African American >60 >60 mL/min/1.73 m^2   Troponin I   Result Value Ref Range    Troponin I <0.006 0.000 - 0.026 ng/mL   Lactic acid, plasma   Result Value Ref Range    Lactate (Lactic Acid) 1.5 0.5 - 2.2 mmol/L         Imaging Results:  Imaging Results          CT Chest Abdomen Pelvis W W/O Contrast (XPD) (Edited Result - FINAL)  Result time 04/04/18 14:15:49    Addendum 1 of 1 by Terrence Rasmussen Jr., MD (04/04/18 14:15:49)    Right lung collapse and effusion are new since the prior PET tumor imaging exam.      Electronically signed by: TERRENCE RASMUSSEN MD  Date:     04/04/18  Time:    14:15                Final result by Terrence Rasmussen Jr., MD (04/04/18 14:11:50)                 Impression:        1.  Known right hilar pulmonary mass.  I suspect this mass has resulted in obstruction of mainstem bronchus with associated collapse of the right lung.  Multiple zones of hypodensity within the right lung, could  correlate with mass formation.  Right-sided pleural effusion.  Shift of midline structures to the right as a result.  No left lung masses.  2.  No findings suspicious for liver or adrenal gland masses.  3.  Chronic changes in the bones.  No osteoblastic metastatic disease can be appreciated.  4.  Prostate gland enlargement.  5.  Left renal calcifications.    All CT scans at this facility use dose modulation, iterative reconstruction, and/or weight based dosing when appropriate to reduce radiation dose to as low as reasonably achievable.      Electronically signed by: ANTHONY RUSHING MD  Date:     04/04/18  Time:    14:11              Narrative:    EXAM:   [id number] CHEST ABDOMEN PELVIS W W/O CONTRAST (XPD)    CLINICAL HISTORY:  R/O metastasis  .  Review of prior imaging studies indicates a right lung and right hilar mass.    COMPARISON:  None    TECHNIQUE: Postcontrast axial images of the chest were obtained.  Pre-and postcontrast axial images of the abdomen and pelvis were obtained.  Multiplanar reconstruction images were produced.  75 ml of Omnipaque 350 was administered intravenously.  Multiplanar reconstruction images were produced.    FINDINGS:     CT chest findings: The patient is now status post right pneumonectomy.  The right lung is collapsed.  Multiple hypodense masses are identified within the partially collapsed right lung, raising the suspicion for pulmonary masses.  There is a mild right-sided pleural effusion.  Shift of mediastinal structures to the right.  Obstruction of the right mainstem bronchus.    Left lung appears clear.  No left lung masses.    Right hilum is obscured secondary to the lung collapse.  Right hilar adenopathy cannot be excluded.  Small mediastinal lymph nodes are noted.  No contralateral left hilar lymph nodes.  Oral    No suspicious findings to indicate lytic or blastic bone lesions.  Scattered degenerative changes in the thoracic spine.    Right sided Mediport catheter in  place.    Thoracic aorta appears unremarkable.  No central pulmonary emboli can be identified.    Abdomen and pelvis findings: Gallbladder, bile ducts, liver, spleen, pancreas, adrenal glands are unremarkable.  Scattered aortic and iliac artery calcifications.  No aortic aneurysm.    No right renal calculi.  Ossifications in the left kidney, likely within collecting structures.  No obstructive uropathy.  No suspicious kidney mass.  Small right renal cortical cysts are present.  Urinary bladder is unremarkable.  Prostate gland is mildly enlarged, measuring 5.1 x 5.9 x 1.6 cm.    Stomach and duodenum appear unremarkable.  No dilated small bowel loops.  Nothing to indicate intestinal obstruction.  Terminal ileum appears unremarkable.  Visualized appendix appears normal.  Moderate amount of colonic fecal material in the transverse and proximal descending colon.  No acute colitis or diverticulitis.  No obvious colonic mass.  Redundancy of the sigmoid colon.    Degenerative changes in the spine.  No suspicious lytic or blastic bone lesions to indicate metastatic disease.                             X-Ray Chest AP Portable (Final result)  Result time 04/04/18 09:18:30    Final result by Terrence Rasmussen Jr., MD (04/04/18 09:18:30)                 Impression:          Probable right pneumonectomy.  Correlation is suggested.  Left lung appears clear.      Electronically signed by: TERRENCE RASMUSSEN MD  Date:     04/04/18  Time:    09:18              Narrative:    EXAM:   XR CHEST AP PORTABLE    CLINICAL HISTORY:  SOB       COMPARISON:  09/18/2017    FINDINGS: Complete opacification of right hemithorax.  This may be secondary to a right pneumonectomy with subsequent pleural fluid development.  Shift of mediastinal structures to the right.  Right sided Mediport catheter in place.    Indication of some hyperaeration of the left lung.  Left lung appears clear.                             The EKG was ordered, reviewed, and  independently interpreted by the ED provider.  Interpretation time: 9:00  Rate: 98 BPM  Rhythm: normal sinus rhythm  Interpretation: Right BBB. Left anterior fascicular block. Septal infarct.. No STEMI.               The Emergency Provider reviewed the vital signs and test results, which are outlined above.    ED Discussion     2:49 PM: Re-evaluated pt. Pt is resting comfortably and is in no acute distress.  D/w pt all pertinent results. D/w pt any concerns expressed at this time. Answered all questions. Pt expresses understanding at this time.    3:06 PM: Dr. Galeas discussed the pt's case with Dr. De Luna (Hem/onc) who recommends admission for discussion of treatment options and consultation of radiation from radiology.    4:19 PM: Reassessed pt at this time. Pt counseled extensively regarding recommendation for being placed in observation to discuss treatment options with oncology and radiation. Pt communicates understanding, but states that he wishes to contact a hospice of his own accord. Pt states that he desires discharge. Discussed with pt all pertinent ED information and results. Discussed pt dx and plan of tx. Gave pt all f/u and return to the ED instructions. All questions and concerns were addressed at this time. Pt expresses understanding of information and instructions, and is comfortable with plan to discharge. Pt is stable for discharge.    Patient was instructed to return immediately for any worsening or change in current symptoms or change in decision regarding his plan of care.            ED Medication(s):  Medications   albuterol-ipratropium 2.5mg-0.5mg/3mL nebulizer solution 3 mL (3 mLs Nebulization Given 4/4/18 9399)   omnipaque 350 iohexol 30 mL (30 mLs Oral Given 4/4/18 1121)   omnipaque 350 iohexol 75 mL (75 mLs Intravenous Given 4/4/18 1350)       Discharge Medication List as of 4/4/2018  4:07 PM          Follow-up Information     Sunday Zhu MD. Schedule an appointment as  soon as possible for a visit in 2 days.    Specialty:  Internal Medicine  Why:  for reassessment  Contact information:  [place] [address] [phone]                     Medical Decision Making    Medical Decision Making:   Clinical Tests:   Lab Tests: Ordered and Reviewed  Radiological Study: Ordered and Reviewed  Medical Tests: Ordered and Reviewed           Scribe Attestation:   Scribe #1: I performed the above scribed service and the documentation accurately describes the services I performed. I attest to the accuracy of the note.    Attending:   Physician Attestation Statement for Scribe #1: I, Alvarado Galeas MD, personally performed the services described in this documentation, as scribed by Ellen Ching, in my presence, and it is both accurate and complete.       Scribe Attestation:   Scribe #2: I performed the above scribed service and the documentation accurately describes the services I performed. I attest to the accuracy of the note.    Attending Attestation:           Physician Attestation for Scribe:    Physician Attestation Statement for Scribe #2: I, Alvarado Galeas MD, reviewed documentation, as scribed by Eligio Wright in my presence, and it is both accurate and complete. I also acknowledge and confirm the content of the note done by Emmaibe #1.          Clinical Impression       ICD-10-CM ICD-9-CM   1. Malignant neoplasm of hilus of right lung C34.01 162.2   2. Bronchial obstruction J98.09 519.19   3. Hyponatremia E87.1 276.1       Disposition:   Disposition: Discharged  Condition: Stable         Alvarado Galeas MD  04/08/18 2023

## 2018-04-05 ENCOUNTER — TELEPHONE (OUTPATIENT)
Dept: HEMATOLOGY/ONCOLOGY | Facility: CLINIC | Age: 68
End: 2018-04-05

## 2018-04-05 NOTE — TELEPHONE ENCOUNTER
Called to f/u with pt post ED visit. Spoke to pt's wife. She says pt will contact hospice when he is ready. Provided  her with number to call Nadiya at [facility]. Offered support and reassurance of continued support. Provided her with SW number to call with any additional needs. SW will f/u further as requested/needed.

## 2018-04-10 ENCOUNTER — TELEPHONE (OUTPATIENT)
Dept: FAMILY MEDICINE | Facility: CLINIC | Age: 68
End: 2018-04-10

## 2018-04-10 DIAGNOSIS — C34.91 SQUAMOUS CELL CARCINOMA OF RIGHT LUNG: Primary | Chronic | ICD-10-CM

## 2018-04-10 DIAGNOSIS — G89.3 NEOPLASM RELATED PAIN: ICD-10-CM

## 2018-04-10 DIAGNOSIS — C77.1 CANCER OF INTRATHORACIC LYMPH NODES, SECONDARY: ICD-10-CM

## 2018-04-10 RX ORDER — HYDROCODONE BITARTRATE AND ACETAMINOPHEN 10; 325 MG/1; MG/1
1 TABLET ORAL EVERY 6 HOURS PRN
Qty: 60 TABLET | Refills: 0 | Status: SHIPPED | OUTPATIENT
Start: 2018-04-10

## 2018-04-10 NOTE — TELEPHONE ENCOUNTER
----- Message from Rachel Purvis sent at 4/10/2018  9:13 AM CDT -----  Contact: pt daughter Venecia  Calling with questions and health concerns [phone]

## (undated) DEVICE — APPLICATOR CHLORAPREP ORN 26ML

## (undated) DEVICE — UNDERGLOVES BIOGEL PI SZ 7 LF

## (undated) DEVICE — CLOSURE SKIN STERI STRIP 1/2X4

## (undated) DEVICE — GAUZE SPONGE 4X4 12PLY

## (undated) DEVICE — MANIFOLD 4 PORT

## (undated) DEVICE — SYR 10CC LUER LOCK

## (undated) DEVICE — ELECTRODE REM PLYHSV RETURN 9

## (undated) DEVICE — COVER OVERHEAD SURG LT BLUE

## (undated) DEVICE — DRAPE MOBILE C-ARM

## (undated) DEVICE — SEE MEDLINE ITEM 157027

## (undated) DEVICE — NDL HYPO SAFETY 22 X 1 1/2

## (undated) DEVICE — SEE MEDLINE ITEM 152622

## (undated) DEVICE — SHEET THYROID W/ISO-BAC

## (undated) DEVICE — SEE MEDLINE ITEM 152739

## (undated) DEVICE — SEE MEDLINE ITEM 146420

## (undated) DEVICE — DRESSING TRANS 4X4 TEGADERM

## (undated) DEVICE — SUT VICRYL PLUS 4-0 P3 18IN

## (undated) DEVICE — DECANTER VIAL ASEPTIC TRANSFER

## (undated) DEVICE — ADHESIVE MASTISOL VIAL 48/BX

## (undated) DEVICE — GLOVE SURGICAL LATEX SZ 7

## (undated) DEVICE — SEE MEDLINE ITEM 157117